# Patient Record
Sex: MALE | Race: BLACK OR AFRICAN AMERICAN | NOT HISPANIC OR LATINO | Employment: UNEMPLOYED | URBAN - METROPOLITAN AREA
[De-identification: names, ages, dates, MRNs, and addresses within clinical notes are randomized per-mention and may not be internally consistent; named-entity substitution may affect disease eponyms.]

---

## 2017-04-17 ENCOUNTER — OFFICE VISIT (OUTPATIENT)
Dept: PHYSICAL MEDICINE AND REHAB | Facility: CLINIC | Age: 57
End: 2017-04-17
Payer: COMMERCIAL

## 2017-04-17 ENCOUNTER — HOSPITAL ENCOUNTER (OUTPATIENT)
Dept: RADIOLOGY | Facility: HOSPITAL | Age: 57
Discharge: HOME | End: 2017-04-17
Attending: PHYSICAL MEDICINE & REHABILITATION
Payer: COMMERCIAL

## 2017-04-17 VITALS
DIASTOLIC BLOOD PRESSURE: 99 MMHG | HEART RATE: 73 BPM | WEIGHT: 165 LBS | SYSTOLIC BLOOD PRESSURE: 153 MMHG | HEIGHT: 71 IN | BODY MASS INDEX: 23.1 KG/M2

## 2017-04-17 DIAGNOSIS — M41.9 SCOLIOSIS OF LUMBAR SPINE, UNSPECIFIED SCOLIOSIS TYPE: ICD-10-CM

## 2017-04-17 DIAGNOSIS — M25.552 LEFT HIP PAIN: Primary | ICD-10-CM

## 2017-04-17 DIAGNOSIS — M25.552 BILATERAL HIP PAIN: ICD-10-CM

## 2017-04-17 DIAGNOSIS — M54.50 CHRONIC LOW BACK PAIN WITHOUT SCIATICA, UNSPECIFIED BACK PAIN LATERALITY: ICD-10-CM

## 2017-04-17 DIAGNOSIS — M25.551 BILATERAL HIP PAIN: ICD-10-CM

## 2017-04-17 DIAGNOSIS — M79.18 MYOFASCIAL PAIN ON RIGHT SIDE: ICD-10-CM

## 2017-04-17 DIAGNOSIS — M25.552 LEFT HIP PAIN: ICD-10-CM

## 2017-04-17 DIAGNOSIS — M51.36 DDD (DEGENERATIVE DISC DISEASE), LUMBAR: ICD-10-CM

## 2017-04-17 DIAGNOSIS — G89.29 CHRONIC LOW BACK PAIN WITHOUT SCIATICA, UNSPECIFIED BACK PAIN LATERALITY: ICD-10-CM

## 2017-04-17 PROCEDURE — 73521 X-RAY EXAM HIPS BI 2 VIEWS: CPT | Mod: 26,,, | Performed by: RADIOLOGY

## 2017-04-17 PROCEDURE — 1160F RVW MEDS BY RX/DR IN RCRD: CPT | Mod: S$GLB,,, | Performed by: PHYSICAL MEDICINE & REHABILITATION

## 2017-04-17 PROCEDURE — 73521 X-RAY EXAM HIPS BI 2 VIEWS: CPT | Mod: TC,PN

## 2017-04-17 PROCEDURE — 72110 X-RAY EXAM L-2 SPINE 4/>VWS: CPT | Mod: 26,,, | Performed by: RADIOLOGY

## 2017-04-17 PROCEDURE — 72110 X-RAY EXAM L-2 SPINE 4/>VWS: CPT | Mod: TC,PN

## 2017-04-17 PROCEDURE — 99999 PR PBB SHADOW E&M-NEW PATIENT-LVL III: CPT | Mod: PBBFAC,,, | Performed by: PHYSICAL MEDICINE & REHABILITATION

## 2017-04-17 PROCEDURE — 20552 NJX 1/MLT TRIGGER POINT 1/2: CPT | Mod: S$GLB,,, | Performed by: PHYSICAL MEDICINE & REHABILITATION

## 2017-04-17 PROCEDURE — 99204 OFFICE O/P NEW MOD 45 MIN: CPT | Mod: 25,S$GLB,, | Performed by: PHYSICAL MEDICINE & REHABILITATION

## 2017-04-17 PROCEDURE — 76942 ECHO GUIDE FOR BIOPSY: CPT | Mod: S$GLB,,, | Performed by: PHYSICAL MEDICINE & REHABILITATION

## 2017-04-17 RX ORDER — BETAMETHASONE SODIUM PHOSPHATE AND BETAMETHASONE ACETATE 3; 3 MG/ML; MG/ML
6 INJECTION, SUSPENSION INTRA-ARTICULAR; INTRALESIONAL; INTRAMUSCULAR; SOFT TISSUE
Status: COMPLETED | OUTPATIENT
Start: 2017-04-17 | End: 2017-04-17

## 2017-04-17 RX ADMIN — BETAMETHASONE SODIUM PHOSPHATE AND BETAMETHASONE ACETATE 6 MG: 3; 3 INJECTION, SUSPENSION INTRA-ARTICULAR; INTRALESIONAL; INTRAMUSCULAR; SOFT TISSUE at 03:04

## 2017-04-18 NOTE — PROGRESS NOTES
[facility]    CHIEF COMPLAINT:   Chief Complaint   Patient presents with    Hip Pain     right hip pain     HISTORY OF PRESENT ILLNESS: Devin Lopez is a 57 y.o. male who presents to me for the first time for evaluation and treatment of right hip pain.  The pain is been present for the last several months.  There was no specific trauma or injury event.  The pain is been increasing over recent weeks.  He rates the pain as a 7 on a scale of 1-10.  He locates the pain most prominently over the right posterior iliac crest.  The pain is increased when he tries to lift anything.  He notes that he sits for prolonged periods for his job and has increased pain when doing so.  He notes some numbness and tingling in the bilateral legs, however he reports this is been present since a cervical spine surgery 25 years ago.  The pain is reduced with rest.  The pain is achy and sharp in nature.  He is not currently taking any medicine for this pain.  He's had no prior treatment of this issue.  He does report that he frequently wakes up from sleep with the pain.    Review of Systems   Constitutional: Negative for fever.   HENT: Negative for drooling.    Eyes: Negative for discharge.   Respiratory: Negative for choking.    Cardiovascular: Negative for chest pain.   Genitourinary: Negative for flank pain.  No bowel or bladder incontinence.  Skin: Negative for wound.   Allergic/Immunologic: Negative for immunocompromised state.   Neurological: Negative for tremors and syncope.   Psychiatric/Behavioral: Negative for behavioral problems.     Past Medical History: History reviewed. No pertinent past medical history.    Past Surgical History:   Past Surgical History:   Procedure Laterality Date    SPINE SURGERY         Family History:   Family History   Problem Relation Age of Onset    Heart disease Mother     Cancer Father        Medications:   No current outpatient prescriptions on file prior to visit.     No  "current facility-administered medications on file prior to visit.      Allergies: Review of patient's allergies indicates:  No Known Allergies    Social History:   Social History     Social History    Marital status:      Spouse name: N/A    Number of children: N/A    Years of education: N/A     Social History Main Topics    Smoking status: Current Every Day Smoker     Packs/day: 1.00     Types: Cigarettes    Smokeless tobacco: None    Alcohol use 0.0 oz/week    Drug use: No    Sexual activity: Yes     Partners: Female      Comment: wife     Other Topics Concern    None     Social History Narrative     Devin works as a car .    PHYSICAL EXAMINATION:   General    Vitals:    04/17/17 1403   BP: (!) 153/99   Pulse: 73   Weight: 74.8 kg (165 lb)   Height: 5' 11" (1.803 m)     Constitutional: Oriented to person, place, and time. No apparent distress. Appears well-developed and well-nourished. Pleasant.  HENT:   Head: Normocephalic and atraumatic.   Eyes: Right eye exhibits no discharge. Left eye exhibits no discharge. No scleral icterus.   Pulmonary/Chest: Effort normal. No respiratory distress.   Abdominal: There is no guarding.   Neurological: Alert and oriented to person, place, and time.   Psychiatric: Behavior is normal.   Back Exam     Tenderness   Back tenderness location: There is some mild tenderness over the right lumbar facets.    Range of Motion   Extension: 50   Flexion: 80   Lateral Bend Right: 60   Lateral Bend Left: 50   Rotation Right: 60   Rotation Left: 50     Muscle Strength   Right Quadriceps:  5/5   Left Quadriceps:  5/5   Right Hamstrings:  5/5   Left Hamstrings:  5/5     Tests   Straight leg raise right: negative  Straight leg raise left: negative    Reflexes   Patellar: 2/4  Achilles: 2/4    Other   Back sensation: He reports subjective reduced sensation to light touch over the lateral right thigh and calf in comparison to the left.  Gait: normal   Erythema: no " back redness  Scars: absent        INSPECTION: There is no swelling, ecchymoses, erythema about the lumbar spine or hip.  There is a right-sided lumbar hump with lumbar flexion.  Palpation: There is no tenderness to palpation about the lumbar spine the midline. There is tenderness to palpation about the right lumbar paraspinal musculature. No tenderness over the bilateral SI joints, or GTB.  There is significant tender to palpation about the right gluteal muscles just inferior to the posterior iliac crest, and palpation of this area seems to reproduce his pain.  Range of motion: + facet loading on the right. Negative SLR bilaterally. Negative MARILEE bilaterally.  Neurologic: Manual muscle testing reveals 5 out of 5 strength throughout bilateral lower extremities. Tone is normal about the bilateral lower extremities. Reflexes are 2+ and symmetric throughout bilateral lower extremities.    Imaging  X-ray of the lumbar spine from 4/17/2017: There is moderate lumbar dextroscoliosis centered at L2.  Moderate loss of disc height is present at L2-3 and L3-4 with associated vacuum phenomenon within the disc space at L2-3.  Moderate multilevel marginal vertebral spurring is present.  No pars interarticularis defect is identified.  Minimal retrolisthesis of L2 on L3 is noted.  There is heavy calcification of the aorta    X-ray of the right hip from 4/17/2017:  No fracture or dislocation.  Hip joint spaces are well-maintained bilaterally.  Mild degenerative change of the pubic symphysis is noted.    Data Reviewed: X-ray    Supportive Actions: Independent visualization of images or test specimens    ASSESSMENT:   1. Left hip pain    2. Scoliosis of lumbar spine, unspecified scoliosis type    3. Myofascial pain on right side    4. DDD (degenerative disc disease), lumbar      PLAN:     1. Time was spent reviewing the above diagnosis in depth with Devin today, including acute management and rehabilitation.     2.  Mr. Lopez has  "some pain and somewhat atypical location located over the superior gluteal muscles.  Palpation of this area seems to reproduce his pain, which can be consistent with myofascial pain.  I offered simple trigger point injections under ultrasound guidance to avoid surrounding neurovascular structures.  He is in favor of this option, see procedure note below.    3.  A significant right-sided lumbar hump was observed on physical exam today.  Follow-up x-rays do so moderate scoliosis with significant degenerative disc disease.  Suspicion of upper lumbar radiculopathy could be considered if today's trigger point injections did not produce results.     4.  I'll contact him by phone in about 2 weeks to assess his response to today's injections.  If he does not get relief we may consider MRI of the lumbar spine.  If he does get relief we may add physical therapy at that time.    Procedure Note: Trigger point injections  Time: 13:30  Indications: Pain  Description: After 1 maximal tender point was identified in the right gluteal musculature, the overlying skin was cleaned with Betadine swabs.  Using a curvilinear ultrasound probe, the point of maximal tenderness was visualized over the right gluteal musculature. Using an in plane approach, the needle was advanced into the musculature. This point was injected with a mixture of 4 cc of 1% Lidocaine and 1 cc of Celestone using a 22 g 3.5" needle. A stellate pattern was then used to needle the surrounding area. Needle was removed and areas cleaned. Blood loss minimal.  Complications: None  Disposition: Pt left in good condition with no complaints.    The above note was completed, in part, with the aid of Dragon dictation software/hardware. Translation errors may be present.    "

## 2017-04-20 ENCOUNTER — TELEPHONE (OUTPATIENT)
Dept: PHYSICAL MEDICINE AND REHAB | Facility: CLINIC | Age: 57
End: 2017-04-20

## 2017-04-20 NOTE — TELEPHONE ENCOUNTER
----- Message from Stacey M Lefort sent at 4/20/2017 10:57 AM CDT -----  Contact: Patient [phone]  Patient needs a callback - he said he is unable to sleep at night due to the pain. Please call him at [phone]. Thank you.

## 2017-04-21 NOTE — TELEPHONE ENCOUNTER
Left message on machine that he should give hip more time and if not improved by end of next week we will get MRI in the meantime use lidocaine over the counter patch

## 2017-04-24 ENCOUNTER — TELEPHONE (OUTPATIENT)
Dept: PHYSICAL MEDICINE AND REHAB | Facility: CLINIC | Age: 57
End: 2017-04-24

## 2017-04-24 DIAGNOSIS — M54.16 LUMBAR RADICULOPATHY: Primary | ICD-10-CM

## 2017-04-24 NOTE — TELEPHONE ENCOUNTER
Patient is still having pain in left side advised that Dr Montes ordered an MRI if the pain persists. He said he will have to get with work and see when he can do the MRI and call us back at the clinic. Patient was insistent that he was told by phone staff that the dr would give him a steroid injection. Much time was spent explaining to patient that Dr Montes decides best course of treatment once he evaluates you. He is very upset that he did not get an injection in his back. I explained to him that no one gives back injections in the clinic and in fact if it is decided that the best course of treatment for him would be a back injection that would be done in the surgery center under anesthesia. He said he came here trying to avoid surgery because he had back surgery some 25 years ago. I explained to him that the injection will not be like an open back surgery. But we will need to obtain an MRI first to determine if that is the direction that he will need to go. Patient states he will call us back to schedule. Order for MRI is in computer

## 2017-04-24 NOTE — TELEPHONE ENCOUNTER
----- Message from Stacey M Lefort sent at 4/24/2017 10:22 AM CDT -----  Contact: Patient - [phone]  He needs to talk to you about his pain level. Please call him at [phone]. Thank you.

## 2017-05-01 ENCOUNTER — TELEPHONE (OUTPATIENT)
Dept: PHYSICAL MEDICINE AND REHAB | Facility: CLINIC | Age: 57
End: 2017-05-01

## 2017-05-01 NOTE — TELEPHONE ENCOUNTER
----- Message from Otilio Montes MD sent at 5/1/2017  2:06 PM CDT -----  Do you know if he got scheduled for that l-spine MRI?    ----- Message -----     From: Otilio Montes MD     Sent: 4/17/2017  10:25 PM       To: Otilio Montes MD

## 2017-05-08 ENCOUNTER — TELEPHONE (OUTPATIENT)
Dept: PHYSICAL MEDICINE AND REHAB | Facility: CLINIC | Age: 57
End: 2017-05-08

## 2017-05-08 NOTE — TELEPHONE ENCOUNTER
----- Message from Jana Naik sent at 5/8/2017  9:04 AM CDT -----  Contact: Patient  Patient called advising that he is returning Abi's call.  Patient advised he needs an MRI scheduled.  I am sending the request to radiology scheduling.  Please call patient back at [phone] if you need to further discuss..  Thank you!

## 2018-02-25 ENCOUNTER — HOSPITAL ENCOUNTER (INPATIENT)
Facility: OTHER | Age: 58
LOS: 3 days | Discharge: HOME | DRG: 291 | End: 2018-02-28
Attending: EMERGENCY MEDICINE | Admitting: EMERGENCY MEDICINE
Payer: COMMERCIAL

## 2018-02-25 DIAGNOSIS — R06.02 SOB (SHORTNESS OF BREATH): ICD-10-CM

## 2018-02-25 DIAGNOSIS — I50.41 ACUTE COMBINED SYSTOLIC AND DIASTOLIC CONGESTIVE HEART FAILURE: ICD-10-CM

## 2018-02-25 DIAGNOSIS — Q23.1 BICUSPID AORTIC VALVE: ICD-10-CM

## 2018-02-25 DIAGNOSIS — R06.02 SHORTNESS OF BREATH: ICD-10-CM

## 2018-02-25 DIAGNOSIS — J81.0 ACUTE PULMONARY EDEMA: Primary | ICD-10-CM

## 2018-02-25 DIAGNOSIS — I49.9: ICD-10-CM

## 2018-02-25 DIAGNOSIS — I49.8 VENTRICULAR BIGEMINY: ICD-10-CM

## 2018-02-25 DIAGNOSIS — Z72.0 TOBACCO ABUSE: ICD-10-CM

## 2018-02-25 DIAGNOSIS — I50.9 ACUTE CONGESTIVE HEART FAILURE, UNSPECIFIED CONGESTIVE HEART FAILURE TYPE: ICD-10-CM

## 2018-02-25 DIAGNOSIS — I35.1 NONRHEUMATIC AORTIC VALVE INSUFFICIENCY: ICD-10-CM

## 2018-02-25 DIAGNOSIS — J96.01 ACUTE RESPIRATORY FAILURE WITH HYPOXIA: ICD-10-CM

## 2018-02-25 DIAGNOSIS — I50.9 CHF (CONGESTIVE HEART FAILURE): ICD-10-CM

## 2018-02-25 DIAGNOSIS — D69.6 THROMBOCYTOPENIA: ICD-10-CM

## 2018-02-25 DIAGNOSIS — D49.4 NEOPLASM OF BLADDER: ICD-10-CM

## 2018-02-25 PROBLEM — R06.01 ORTHOPNEA: Status: ACTIVE | Noted: 2018-02-25

## 2018-02-25 PROBLEM — R79.89 TROPONIN LEVEL ELEVATED: Status: ACTIVE | Noted: 2018-02-25

## 2018-02-25 PROBLEM — R17 SERUM TOTAL BILIRUBIN ELEVATED: Status: ACTIVE | Noted: 2018-02-25

## 2018-02-25 PROBLEM — Z78.9 CURRENT DRINKER OF ALCOHOL: Status: ACTIVE | Noted: 2018-02-25

## 2018-02-25 PROBLEM — J90 BILATERAL PLEURAL EFFUSION: Status: ACTIVE | Noted: 2018-02-25

## 2018-02-25 PROBLEM — I16.0 HYPERTENSIVE URGENCY: Status: ACTIVE | Noted: 2018-02-25

## 2018-02-25 LAB
ALBUMIN SERPL BCP-MCNC: 4 G/DL
ALP SERPL-CCNC: 65 U/L
ALT SERPL W/O P-5'-P-CCNC: 27 U/L
AMPHET+METHAMPHET UR QL: NEGATIVE
ANION GAP SERPL CALC-SCNC: 10 MMOL/L
AST SERPL-CCNC: 26 U/L
BARBITURATES UR QL SCN>200 NG/ML: NEGATIVE
BASOPHILS # BLD AUTO: 0 K/UL
BASOPHILS # BLD AUTO: 0.01 K/UL
BASOPHILS NFR BLD: 0 %
BASOPHILS NFR BLD: 0.1 %
BENZODIAZ UR QL SCN>200 NG/ML: NEGATIVE
BILIRUB DIRECT SERPL-MCNC: 0.7 MG/DL
BILIRUB SERPL-MCNC: 1.8 MG/DL
BILIRUB UR QL STRIP: NEGATIVE
BNP SERPL-MCNC: 1166 PG/ML
BUN SERPL-MCNC: 14 MG/DL
BZE UR QL SCN: NEGATIVE
CALCIUM SERPL-MCNC: 9.4 MG/DL
CANNABINOIDS UR QL SCN: NEGATIVE
CHLORIDE SERPL-SCNC: 106 MMOL/L
CK MB SERPL-MCNC: 10.8 NG/ML
CK MB SERPL-RTO: 5 %
CK SERPL-CCNC: 217 U/L
CLARITY UR: CLEAR
CO2 SERPL-SCNC: 24 MMOL/L
COLOR UR: YELLOW
CREAT SERPL-MCNC: 1 MG/DL
CREAT UR-MCNC: 52.3 MG/DL
DIFFERENTIAL METHOD: ABNORMAL
DIFFERENTIAL METHOD: ABNORMAL
EOSINOPHIL # BLD AUTO: 0 K/UL
EOSINOPHIL # BLD AUTO: 0 K/UL
EOSINOPHIL NFR BLD: 0.2 %
EOSINOPHIL NFR BLD: 0.3 %
ERYTHROCYTE [DISTWIDTH] IN BLOOD BY AUTOMATED COUNT: 14.5 %
ERYTHROCYTE [DISTWIDTH] IN BLOOD BY AUTOMATED COUNT: 14.6 %
EST. GFR  (AFRICAN AMERICAN): >60 ML/MIN/1.73 M^2
EST. GFR  (NON AFRICAN AMERICAN): >60 ML/MIN/1.73 M^2
GLUCOSE SERPL-MCNC: 96 MG/DL
GLUCOSE UR QL STRIP: NEGATIVE
HCT VFR BLD AUTO: 41.8 %
HCT VFR BLD AUTO: 43.5 %
HETEROPH AB SERPL QL IA: NEGATIVE
HGB BLD-MCNC: 14.7 G/DL
HGB BLD-MCNC: 15.4 G/DL
HGB UR QL STRIP: ABNORMAL
KETONES UR QL STRIP: NEGATIVE
LEUKOCYTE ESTERASE UR QL STRIP: NEGATIVE
LIPASE SERPL-CCNC: 8 U/L
LYMPHOCYTES # BLD AUTO: 2.3 K/UL
LYMPHOCYTES # BLD AUTO: 2.5 K/UL
LYMPHOCYTES NFR BLD: 37.2 %
LYMPHOCYTES NFR BLD: 43.2 %
MAGNESIUM SERPL-MCNC: 1.8 MG/DL
MCH RBC QN AUTO: 34.3 PG
MCH RBC QN AUTO: 34.9 PG
MCHC RBC AUTO-ENTMCNC: 35.2 G/DL
MCHC RBC AUTO-ENTMCNC: 35.4 G/DL
MCV RBC AUTO: 98 FL
MCV RBC AUTO: 99 FL
METHADONE UR QL SCN>300 NG/ML: NEGATIVE
MONOCYTES # BLD AUTO: 0.3 K/UL
MONOCYTES # BLD AUTO: 0.5 K/UL
MONOCYTES NFR BLD: 4.8 %
MONOCYTES NFR BLD: 7 %
NEUTROPHILS # BLD AUTO: 2.7 K/UL
NEUTROPHILS # BLD AUTO: 3.7 K/UL
NEUTROPHILS NFR BLD: 51.6 %
NEUTROPHILS NFR BLD: 55.3 %
NITRITE UR QL STRIP: NEGATIVE
OPIATES UR QL SCN: NEGATIVE
PATH REV BLD -IMP: NORMAL
PCP UR QL SCN>25 NG/ML: NEGATIVE
PH UR STRIP: 6 [PH] (ref 5–8)
PHOSPHATE SERPL-MCNC: 3.1 MG/DL
PLATELET # BLD AUTO: 84 K/UL
PLATELET # BLD AUTO: 86 K/UL
PMV BLD AUTO: 11.5 FL
PMV BLD AUTO: 11.7 FL
POCT GLUCOSE: 78 MG/DL (ref 70–110)
POTASSIUM SERPL-SCNC: 4.7 MMOL/L
PROT SERPL-MCNC: 7.1 G/DL
PROT UR QL STRIP: NEGATIVE
RBC # BLD AUTO: 4.28 M/UL
RBC # BLD AUTO: 4.41 M/UL
SODIUM SERPL-SCNC: 140 MMOL/L
SP GR UR STRIP: 1.01 (ref 1–1.03)
TOXICOLOGY INFORMATION: NORMAL
TROPONIN I SERPL DL<=0.01 NG/ML-MCNC: 0.04 NG/ML
TSH SERPL DL<=0.005 MIU/L-ACNC: 1.74 UIU/ML
URN SPEC COLLECT METH UR: ABNORMAL
UROBILINOGEN UR STRIP-ACNC: NEGATIVE EU/DL
WBC # BLD AUTO: 5.26 K/UL
WBC # BLD AUTO: 6.7 K/UL

## 2018-02-25 PROCEDURE — 93010 ELECTROCARDIOGRAM REPORT: CPT | Mod: 76,,, | Performed by: INTERNAL MEDICINE

## 2018-02-25 PROCEDURE — 93005 ELECTROCARDIOGRAM TRACING: CPT

## 2018-02-25 PROCEDURE — 83036 HEMOGLOBIN GLYCOSYLATED A1C: CPT

## 2018-02-25 PROCEDURE — 82248 BILIRUBIN DIRECT: CPT

## 2018-02-25 PROCEDURE — 63600175 PHARM REV CODE 636 W HCPCS: Performed by: HOSPITALIST

## 2018-02-25 PROCEDURE — 99223 1ST HOSP IP/OBS HIGH 75: CPT | Mod: ,,, | Performed by: HOSPITALIST

## 2018-02-25 PROCEDURE — 83735 ASSAY OF MAGNESIUM: CPT

## 2018-02-25 PROCEDURE — 96365 THER/PROPH/DIAG IV INF INIT: CPT

## 2018-02-25 PROCEDURE — 99900035 HC TECH TIME PER 15 MIN (STAT)

## 2018-02-25 PROCEDURE — 93010 ELECTROCARDIOGRAM REPORT: CPT | Mod: ,,, | Performed by: INTERNAL MEDICINE

## 2018-02-25 PROCEDURE — 36415 COLL VENOUS BLD VENIPUNCTURE: CPT

## 2018-02-25 PROCEDURE — 96375 TX/PRO/DX INJ NEW DRUG ADDON: CPT

## 2018-02-25 PROCEDURE — 11000001 HC ACUTE MED/SURG PRIVATE ROOM

## 2018-02-25 PROCEDURE — 99284 EMERGENCY DEPT VISIT MOD MDM: CPT | Mod: 25

## 2018-02-25 PROCEDURE — 81003 URINALYSIS AUTO W/O SCOPE: CPT

## 2018-02-25 PROCEDURE — 25000003 PHARM REV CODE 250: Performed by: HOSPITALIST

## 2018-02-25 PROCEDURE — 83690 ASSAY OF LIPASE: CPT

## 2018-02-25 PROCEDURE — 80053 COMPREHEN METABOLIC PANEL: CPT

## 2018-02-25 PROCEDURE — 84484 ASSAY OF TROPONIN QUANT: CPT

## 2018-02-25 PROCEDURE — 84443 ASSAY THYROID STIM HORMONE: CPT

## 2018-02-25 PROCEDURE — 80307 DRUG TEST PRSMV CHEM ANLYZR: CPT

## 2018-02-25 PROCEDURE — 84100 ASSAY OF PHOSPHORUS: CPT

## 2018-02-25 PROCEDURE — 85025 COMPLETE CBC W/AUTO DIFF WBC: CPT

## 2018-02-25 PROCEDURE — 63600175 PHARM REV CODE 636 W HCPCS: Performed by: EMERGENCY MEDICINE

## 2018-02-25 PROCEDURE — 83880 ASSAY OF NATRIURETIC PEPTIDE: CPT

## 2018-02-25 PROCEDURE — 82553 CREATINE MB FRACTION: CPT

## 2018-02-25 PROCEDURE — 86308 HETEROPHILE ANTIBODY SCREEN: CPT

## 2018-02-25 RX ORDER — CARVEDILOL 6.25 MG/1
6.25 TABLET ORAL 2 TIMES DAILY
Status: DISCONTINUED | OUTPATIENT
Start: 2018-02-25 | End: 2018-02-28 | Stop reason: HOSPADM

## 2018-02-25 RX ORDER — FUROSEMIDE 10 MG/ML
20 INJECTION INTRAMUSCULAR; INTRAVENOUS ONCE
Status: COMPLETED | OUTPATIENT
Start: 2018-02-25 | End: 2018-02-25

## 2018-02-25 RX ORDER — FUROSEMIDE 10 MG/ML
40 INJECTION INTRAMUSCULAR; INTRAVENOUS
Status: COMPLETED | OUTPATIENT
Start: 2018-02-25 | End: 2018-02-25

## 2018-02-25 RX ORDER — HYDRALAZINE HYDROCHLORIDE 20 MG/ML
10 INJECTION INTRAMUSCULAR; INTRAVENOUS EVERY 6 HOURS PRN
Status: DISCONTINUED | OUTPATIENT
Start: 2018-02-25 | End: 2018-02-28 | Stop reason: HOSPADM

## 2018-02-25 RX ORDER — LOSARTAN POTASSIUM 50 MG/1
50 TABLET ORAL DAILY
Status: DISCONTINUED | OUTPATIENT
Start: 2018-02-25 | End: 2018-02-28 | Stop reason: HOSPADM

## 2018-02-25 RX ORDER — ASPIRIN 325 MG
325 TABLET ORAL ONCE
Status: COMPLETED | OUTPATIENT
Start: 2018-02-25 | End: 2018-02-25

## 2018-02-25 RX ORDER — ENOXAPARIN SODIUM 100 MG/ML
70 INJECTION SUBCUTANEOUS ONCE
Status: COMPLETED | OUTPATIENT
Start: 2018-02-25 | End: 2018-02-25

## 2018-02-25 RX ORDER — RAMELTEON 8 MG/1
8 TABLET ORAL NIGHTLY PRN
Status: DISCONTINUED | OUTPATIENT
Start: 2018-02-25 | End: 2018-02-28 | Stop reason: HOSPADM

## 2018-02-25 RX ADMIN — CARVEDILOL 6.25 MG: 6.25 TABLET, FILM COATED ORAL at 05:02

## 2018-02-25 RX ADMIN — THIAMINE HYDROCHLORIDE 100 MG: 100 INJECTION, SOLUTION INTRAMUSCULAR; INTRAVENOUS at 04:02

## 2018-02-25 RX ADMIN — FUROSEMIDE 20 MG: 10 INJECTION, SOLUTION INTRAVENOUS at 09:02

## 2018-02-25 RX ADMIN — ENOXAPARIN SODIUM 70 MG: 100 INJECTION SUBCUTANEOUS at 05:02

## 2018-02-25 RX ADMIN — LOSARTAN POTASSIUM 50 MG: 50 TABLET, FILM COATED ORAL at 05:02

## 2018-02-25 RX ADMIN — ASPIRIN 325 MG ORAL TABLET 325 MG: 325 PILL ORAL at 05:02

## 2018-02-25 RX ADMIN — FUROSEMIDE 40 MG: 10 INJECTION, SOLUTION INTRAVENOUS at 02:02

## 2018-02-25 NOTE — HPI
Mr. Lopez is a 57 year old man who presented with 1.5 weeks of worsening CERVANTES and orthopnea without associated chest pain.  He reported that he has developed a choking sensation when he lies down and he has to sleep on 2 pillows.  He has associated BLE edema.  CXR had findings consisted with pulmonary edema and BNP was >1000 without a previous baseline available.  He received 40 mg Lasix IV and was admitted for new onset heart failure.    He has no significant PMH but is a smoker and has a couple shots of liquor/day.  He is usually very active.

## 2018-02-25 NOTE — ED NOTES
Dr. Davey at bedside to explain test results and plan of care. The patient voided 400ml in urinal. He denies pain, no acute distress, will continue to monitor.

## 2018-02-25 NOTE — H&P
"[facility]  History & Physical    Patient Name: Devin Lopez  MRN: [id number]  Admission Date: 2/25/2018  Attending Physician: Gagan Abreu MD   Primary Care Provider: Gage Jaimes Jr, MD         Patient information was obtained from patient, spouse/SO and ER records.     Subjective:     Principal Problem:Acute pulmonary edema    Chief Complaint:   Chief Complaint   Patient presents with    Shortness of Breath     Increasing x 3 weeks/ " I notice when I walk the dog I ahve to stop and sit down becuase I get so tired". + generalzied fatigue, denies chest pains or resp distress        HPI: 57 year old man with no significant PMH, but is EtOH and tobacco user, presents with 1.5 week history of worsening CERVANTES and orthopnea.  He denies any associated chest pain.  He notes that when he lies down he has developed a choking sensation.  He has had to sleep on two pillow recently.  He also note BLE edema.  He denies any history of DTs.  No fever or recent weight loss.  He is usually active.      CXR had findings consisted with pulmonary edema.  EKG shows SR with PVCs.  He received 40 mg Lasix IV and has been urinating and feels much better currently.        History reviewed. No pertinent past medical history.    Past Surgical History:   Procedure Laterality Date    SPINE SURGERY         Review of patient's allergies indicates:  No Known Allergies    No current facility-administered medications on file prior to encounter.      No current outpatient prescriptions on file prior to encounter.     Family History     Problem Relation (Age of Onset)    Cancer Father    Heart disease Mother        Social History Main Topics    Smoking status: Current Every Day Smoker     Packs/day: 0.50     Types: Cigarettes    Smokeless tobacco: Never Used    Alcohol use 8.4 oz/week     14 Shots of liquor per week    Drug use: No    Sexual activity: Yes     Partners: Female      Comment: wife "     Review of Systems   Constitutional: Positive for fatigue. Negative for fever and unexpected weight change.   HENT: Negative for congestion.    Eyes: Negative for visual disturbance.   Respiratory: Positive for choking and shortness of breath.    Cardiovascular: Positive for leg swelling. Negative for chest pain.        2 pillow orthopnea.  CERVANTES.      Gastrointestinal: Negative for abdominal pain, constipation, diarrhea, nausea and vomiting.   Endocrine: Negative for cold intolerance and heat intolerance.   Genitourinary: Negative for difficulty urinating and dysuria.   Musculoskeletal: Negative for arthralgias and gait problem.   Skin: Negative.    Neurological: Negative for dizziness and headaches.   Psychiatric/Behavioral: Negative for agitation and behavioral problems.     Objective:     Vital Signs (Most Recent):  Temp: 98 °F (36.7 °C) (02/25/18 1320)  Pulse: 96 (02/25/18 1614)  Resp: 16 (02/25/18 1614)  BP: (!) 169/87 (02/25/18 1614)  SpO2: (!) 94 % (02/25/18 1614) Vital Signs (24h Range):  Temp:  [98 °F (36.7 °C)] 98 °F (36.7 °C)  Pulse:  [] 96  Resp:  [15-22] 16  SpO2:  [93 %-98 %] 94 %  BP: (144-185)/(74-96) 169/87     Weight: 70.3 kg (155 lb)  Body mass index is 21.62 kg/m².    Physical Exam   Constitutional: He is oriented to person, place, and time. He appears well-developed and well-nourished. No distress.   HENT:   Head: Normocephalic and atraumatic.   Eyes: Conjunctivae are normal. Pupils are equal, round, and reactive to light.   Brown   Neck: Normal range of motion. Neck supple. No JVD present.   Cardiovascular: Normal rate and normal heart sounds.    Regular with irregular beat periodic   Pulmonary/Chest: Effort normal. He has rales.   Bilateral mid to lower lung fields    Abdominal: Soft. Bowel sounds are normal. There is no tenderness.   Musculoskeletal: He exhibits edema.   2+ pitting BLE   Neurological: He is alert and oriented to person, place, and time.   Skin: Skin is warm and dry.    Psychiatric: He has a normal mood and affect. His behavior is normal.         CRANIAL NERVES     CN III, IV, VI   Pupils are equal, round, and reactive to light.       Significant Labs:   CBC:   Recent Labs  Lab 02/25/18  1406   WBC 5.26   HGB 15.4   HCT 43.5   PLT 84*     CMP:   Recent Labs  Lab 02/25/18  1406      K 4.7      CO2 24   GLU 96   BUN 14   CREATININE 1.0   CALCIUM 9.4   PROT 7.1   ALBUMIN 4.0   BILITOT 1.8*   ALKPHOS 65   AST 26   ALT 27   ANIONGAP 10   EGFRNONAA >60     Cardiac Markers:   Recent Labs  Lab 02/25/18  1406   BNP 1,166*     Magnesium:   Recent Labs  Lab 02/25/18  1406   MG 1.8     TSH: No results for input(s): TSH in the last 4320 hours.  Urine Studies:   Recent Labs  Lab 02/25/18  1523   COLORU Yellow   APPEARANCEUA Clear   PHUR 6.0   SPECGRAV 1.015   PROTEINUA Negative   GLUCUA Negative   KETONESU Negative   BILIRUBINUA Negative   OCCULTUA Trace*   NITRITE Negative   UROBILINOGEN Negative   LEUKOCYTESUR Negative       Significant Imaging: I have reviewed all pertinent imaging results/findings within the past 24 hours.   Imaging Results          X-Ray Chest PA And Lateral (Final result)  Result time 02/25/18 14:26:16    Final result by Nyasia Hayden MD (02/25/18 14:26:16)                 Impression:     Abnormal exam, findings may relate to edema or infection.  Correlate with additional clinical findings.      Electronically signed by: Nyasia Hayden MD  Date:     02/25/18  Time:    14:26              Narrative:    2 views obtained.  No comparison study.    The cardiac size is not enlarged.  There is no increase in pulmonary vascularity.  Surgical changes are incompletely visualized in the lower cervical spine.  Osseous structures demonstrate mild spinal curvature.  There are small bilateral pleural effusions.  There are increased interstitial markings with some bibasilar patchy parenchymal opacities, right greater than left.                            Assessment/Plan:      * Acute pulmonary edema    - Suspect secondary to acute CHF.             Bilateral pleural effusion    - Monitor for improvement             Hypertensive urgency    - Treat CHF  - Start BP meds.  - See above.             Serum total bilirubin elevated    - Noted on past lab.    - Liver US in AM.             Current drinker of alcohol    - Counseled to avoid excess.   - Denies any history of DTs.   - Give thiamine 100 mg IV.             Troponin level elevated    - Mild elevation CK, and CK-MB elevated.  - Give  and Lovenox 70 mg once.   - No chest pain.   - Risk factors include tobacco, and possibly HTN.   - A1c, FLP.   - Trend enzymes.            Orthopnea    - Clinically has CHF.             Tobacco abuse    - 1/2 ppd for 40 years.  - Counseled to cease.             Thrombocytopenia    - Platelets were 133 in 2012.  Currently 84.   - Etiology unclear.   - Check COAGs.   - Pathologist interpretation peripheral smear.  - Monitor.            Acute respiratory failure with hypoxia    - Secondary to pulmonary edema secondary to CHF.   - Improving with diuresis.            Acute CHF    - Check cardiac enzymes, TSH.   - Etiology unclear.  Drinks EtOH, and has some CAD risk factors.   - 2-D ECHO  - Cardiology consult.   - Symptoms improving with diuresis.  He is urinating so far close to 2 liters dilute urine after Lasix 40.   - Repeat Lasix 20 at 2000 tonight.   - Start Coreg 6.25 and losartan 50.   - Monitor electrolytes.             VTE Risk Mitigation         Ordered     enoxaparin injection 70 mg  Once     Route:  Subcutaneous        02/25/18 9138             Gagan Abreu MD  Department of Hospital Medicine   [facility]

## 2018-02-25 NOTE — ASSESSMENT & PLAN NOTE
- Check cardiac enzymes, TSH.   - Etiology unclear.  Drinks EtOH, and has some CAD risk factors.   - 2-D ECHO  - Cardiology consult.   - Symptoms improving with diuresis.  He is urinating so far close to 2 liters dilute urine after Lasix 40.   - Repeat Lasix 20 at 2000 tonight.   - Start Coreg 6.25 and losartan 50.   - Monitor electrolytes.

## 2018-02-25 NOTE — ASSESSMENT & PLAN NOTE
- Mild elevation CK, and CK-MB elevated.  - Give  and Lovenox 70 mg once.   - No chest pain.   - Risk factors include tobacco, and possibly HTN.   - A1c, FLP.   - Trend enzymes.

## 2018-02-25 NOTE — SUBJECTIVE & OBJECTIVE
History reviewed. No pertinent past medical history.    Past Surgical History:   Procedure Laterality Date    SPINE SURGERY         Review of patient's allergies indicates:  No Known Allergies    No current facility-administered medications on file prior to encounter.      No current outpatient prescriptions on file prior to encounter.     Family History     Problem Relation (Age of Onset)    Cancer Father    Heart disease Mother        Social History Main Topics    Smoking status: Current Every Day Smoker     Packs/day: 0.50     Types: Cigarettes    Smokeless tobacco: Never Used    Alcohol use 8.4 oz/week     14 Shots of liquor per week    Drug use: No    Sexual activity: Yes     Partners: Female      Comment: wife     Review of Systems   Constitutional: Positive for fatigue. Negative for fever and unexpected weight change.   HENT: Negative for congestion.    Eyes: Negative for visual disturbance.   Respiratory: Positive for choking and shortness of breath.    Cardiovascular: Positive for leg swelling. Negative for chest pain.        2 pillow orthopnea.  CERVANTES.      Gastrointestinal: Negative for abdominal pain, constipation, diarrhea, nausea and vomiting.   Endocrine: Negative for cold intolerance and heat intolerance.   Genitourinary: Negative for difficulty urinating and dysuria.   Musculoskeletal: Negative for arthralgias and gait problem.   Skin: Negative.    Neurological: Negative for dizziness and headaches.   Psychiatric/Behavioral: Negative for agitation and behavioral problems.     Objective:     Vital Signs (Most Recent):  Temp: 98 °F (36.7 °C) (02/25/18 1320)  Pulse: 96 (02/25/18 1614)  Resp: 16 (02/25/18 1614)  BP: (!) 169/87 (02/25/18 1614)  SpO2: (!) 94 % (02/25/18 1614) Vital Signs (24h Range):  Temp:  [98 °F (36.7 °C)] 98 °F (36.7 °C)  Pulse:  [] 96  Resp:  [15-22] 16  SpO2:  [93 %-98 %] 94 %  BP: (144-185)/(74-96) 169/87     Weight: 70.3 kg (155 lb)  Body mass index is 21.62  kg/m².    Physical Exam   Constitutional: He is oriented to person, place, and time. He appears well-developed and well-nourished. No distress.   HENT:   Head: Normocephalic and atraumatic.   Eyes: Conjunctivae are normal. Pupils are equal, round, and reactive to light.   Brown   Neck: Normal range of motion. Neck supple. No JVD present.   Cardiovascular: Normal rate and normal heart sounds.    Regular with irregular beat periodic   Pulmonary/Chest: Effort normal. He has rales.   Bilateral mid to lower lung fields    Abdominal: Soft. Bowel sounds are normal. There is no tenderness.   Musculoskeletal: He exhibits edema.   2+ pitting BLE   Neurological: He is alert and oriented to person, place, and time.   Skin: Skin is warm and dry.   Psychiatric: He has a normal mood and affect. His behavior is normal.         CRANIAL NERVES     CN III, IV, VI   Pupils are equal, round, and reactive to light.       Significant Labs:   CBC:   Recent Labs  Lab 02/25/18  1406   WBC 5.26   HGB 15.4   HCT 43.5   PLT 84*     CMP:   Recent Labs  Lab 02/25/18  1406      K 4.7      CO2 24   GLU 96   BUN 14   CREATININE 1.0   CALCIUM 9.4   PROT 7.1   ALBUMIN 4.0   BILITOT 1.8*   ALKPHOS 65   AST 26   ALT 27   ANIONGAP 10   EGFRNONAA >60     Cardiac Markers:   Recent Labs  Lab 02/25/18  1406   BNP 1,166*     Magnesium:   Recent Labs  Lab 02/25/18  1406   MG 1.8     TSH: No results for input(s): TSH in the last 4320 hours.  Urine Studies:   Recent Labs  Lab 02/25/18  1523   COLORU Yellow   APPEARANCEUA Clear   PHUR 6.0   SPECGRAV 1.015   PROTEINUA Negative   GLUCUA Negative   KETONESU Negative   BILIRUBINUA Negative   OCCULTUA Trace*   NITRITE Negative   UROBILINOGEN Negative   LEUKOCYTESUR Negative       Significant Imaging: I have reviewed all pertinent imaging results/findings within the past 24 hours.   Imaging Results          X-Ray Chest PA And Lateral (Final result)  Result time 02/25/18 14:26:16    Final result by Nyasia THOMAS  MD Jackelyn (02/25/18 14:26:16)                 Impression:     Abnormal exam, findings may relate to edema or infection.  Correlate with additional clinical findings.      Electronically signed by: Nyasia Hayden MD  Date:     02/25/18  Time:    14:26              Narrative:    2 views obtained.  No comparison study.    The cardiac size is not enlarged.  There is no increase in pulmonary vascularity.  Surgical changes are incompletely visualized in the lower cervical spine.  Osseous structures demonstrate mild spinal curvature.  There are small bilateral pleural effusions.  There are increased interstitial markings with some bibasilar patchy parenchymal opacities, right greater than left.

## 2018-02-25 NOTE — ED PROVIDER NOTES
"Encounter Date: 2/25/2018    SCRIBE #1 NOTE: I, Elke Oliva, am scribing for, and in the presence of, Dr. Davey.       History     Chief Complaint   Patient presents with    Shortness of Breath     Increasing x 3 weeks/ " I notice when I walk the dog I ahve to stop and sit down becuase I get so tired". + generalzied fatigue, denies chest pains or resp distress     Time seen by provider: 1:42 PM    This is a 57 y.o. male who presents with complaint of fatigue for the last 1.5 weeks. Symptoms began soon after [place]. Patient notes difficulty breathing when trying to walk his dog, which is surprising since he is generally active. He also endorses a feeling dyspnea worsened when lying down ("like I'm choking") and alleviated when sitting up. He has associated leg swelling and a mild URI with persistent cough since last month. He denies fever, night sweats, weight loss, chest pain, chest tightness, or palpitations. He also abdominal or dark stools. He has taken Robitussin and Aleve with no relief. He has no additional complaints.     Patient reports daily use of alcohol until Lent. He wife says he has been more jittery since that time. Additional past medical, surgical, and social history as outlined in the nursing assessment was reviewed by me.        The history is provided by the patient.     Review of patient's allergies indicates:  No Known Allergies  History reviewed. No pertinent past medical history.  Past Surgical History:   Procedure Laterality Date    SPINE SURGERY       Family History   Problem Relation Age of Onset    Heart disease Mother     Cancer Father      Social History   Substance Use Topics    Smoking status: Current Every Day Smoker     Packs/day: 0.50     Types: Cigarettes    Smokeless tobacco: Never Used    Alcohol use 8.4 oz/week     14 Shots of liquor per week     Review of Systems   Constitutional: Positive for fatigue. Negative for appetite change, chills, diaphoresis, fever and " unexpected weight change.   HENT: Positive for congestion. Negative for rhinorrhea and sore throat.    Respiratory: Positive for cough and shortness of breath.    Cardiovascular: Positive for leg swelling. Negative for chest pain and palpitations.   Gastrointestinal: Positive for diarrhea and nausea. Negative for abdominal pain, blood in stool, constipation and vomiting.   Endocrine: Positive for heat intolerance. Negative for polyuria.   Genitourinary: Negative for decreased urine volume, difficulty urinating, dysuria, enuresis, frequency, hematuria and urgency.   Musculoskeletal: Negative for back pain.   Skin: Negative for rash.   Allergic/Immunologic: Negative for immunocompromised state.   Neurological: Positive for headaches. Negative for dizziness, weakness and numbness.   Hematological: Does not bruise/bleed easily.   Psychiatric/Behavioral: Negative for confusion.       Physical Exam     Initial Vitals [02/25/18 1320]   BP Pulse Resp Temp SpO2   (!) 182/88 100 18 98 °F (36.7 °C) 98 %      MAP       119.33         Physical Exam    Nursing note and vitals reviewed.  Constitutional: He appears well-developed and well-nourished. He is not diaphoretic. No distress.   HENT:   Head: Normocephalic and atraumatic.   Right Ear: External ear normal.   Left Ear: External ear normal.   Eyes: Conjunctivae and EOM are normal. Right eye exhibits no discharge. Left eye exhibits no discharge.   Neck: Normal range of motion. Neck supple. No tracheal deviation present.   Cardiovascular: Normal rate, regular rhythm and intact distal pulses. Exam reveals no gallop and no friction rub.    Murmur heard.   Diastolic murmur is present   Pulmonary/Chest: Breath sounds normal. No stridor. No respiratory distress. He has no wheezes. He has no rhonchi. He has no rales.   Abdominal: Soft. Bowel sounds are normal. He exhibits no distension. There is no tenderness. There is no rebound and no guarding.   Musculoskeletal: Normal range of  motion. He exhibits no edema or tenderness.   Extremities: 2+ pitting edema on the left and 1+ pitting edema on the right.   Neurological: He is alert and oriented to person, place, and time. He has normal strength. No cranial nerve deficit.   Skin: Skin is warm and dry. Capillary refill takes less than 2 seconds. No erythema. No pallor.   Hyperpigmented patches that are baseline, as per patient.   Psychiatric: He has a normal mood and affect. Thought content normal.         ED Course   Procedures  Labs Reviewed   TROPONIN I - Abnormal; Notable for the following:        Result Value    Troponin I 0.041 (*)     All other components within normal limits   B-TYPE NATRIURETIC PEPTIDE - Abnormal; Notable for the following:     BNP 1,166 (*)     All other components within normal limits   CBC W/ AUTO DIFFERENTIAL - Abnormal; Notable for the following:     RBC 4.41 (*)     MCV 99 (*)     MCH 34.9 (*)     RDW 14.6 (*)     Platelets 84 (*)     All other components within normal limits   COMPREHENSIVE METABOLIC PANEL - Abnormal; Notable for the following:     Total Bilirubin 1.8 (*)     All other components within normal limits   URINALYSIS - Abnormal; Notable for the following:     Occult Blood UA Trace (*)     All other components within normal limits   HETEROPHILE AB SCREEN   MAGNESIUM   PHOSPHORUS   LIPASE   MAGNESIUM   PHOSPHORUS   CK   CK-MB   TSH   HEMOGLOBIN A1C      Imaging Results          X-Ray Chest PA And Lateral (Final result)  Result time 02/25/18 14:26:16    Final result by Nyasia Hayden MD (02/25/18 14:26:16)                 Impression:     Abnormal exam, findings may relate to edema or infection.  Correlate with additional clinical findings.      Electronically signed by: Nyasia Hayden MD  Date:     02/25/18  Time:    14:26              Narrative:    2 views obtained.  No comparison study.    The cardiac size is not enlarged.  There is no increase in pulmonary vascularity.  Surgical changes are  incompletely visualized in the lower cervical spine.  Osseous structures demonstrate mild spinal curvature.  There are small bilateral pleural effusions.  There are increased interstitial markings with some bibasilar patchy parenchymal opacities, right greater than left.                            EKG Readings: (Independently Interpreted)   Initial Reading: No STEMI.   Normal sinus rhythm at a rate of 98 bpm with multiple PVCs.       X-Rays:   Independently Interpreted Readings:   Chest X-Ray: Peribronchial thickening. Prominent interstitial markings.     Medical Decision Making:   Initial Assessment:   Patient presents with fatigue. He significant ectopy on the cardiac monitor. He also has a murmur on exam and signs of fluid overload. I will obtain a BNP and check a CXR to look for pulmonary edema. I will also check his electrolytes including magnesium. I believe he will need admission for further evaluation and management.   Clinical Tests:   Lab Tests: Ordered and Reviewed  Radiological Study: Ordered and Reviewed  Medical Tests: Ordered and Reviewed  ED Management:  3:44 PM - Patient remains comfortable. Diagnostic workup is c/w CHF. He has begun voiding since receiving Lasix. He has no respiratory distress. He has had multiple bouts of bigeminy throughout his ED course but continues to have no palpitations of chest pain. I will admit for further management and evaluation by Cardiology. Case discussed with Dr. Abreu of the hospitalist service who has accepted this admission and will assume care of the patient at this time.            Scribe Attestation:   Scribe #1: I performed the above scribed service and the documentation accurately describes the services I performed. I attest to the accuracy of the note.    Attending Attestation:           Physician Attestation for Scribe:  Physician Attestation Statement for Scribe #1: I, Dr. Davey, reviewed documentation, as scribed by Elke Oliva in my presence, and  it is both accurate and complete.                    Clinical Impression:     1. Acute pulmonary edema    2. SOB (shortness of breath)    3. Shortness of breath    4. Dysrhythmias    5. Ventricular bigeminy                               Collette Davey MD  02/25/18 6500       Collette Davey MD  02/25/18 0290

## 2018-02-25 NOTE — ASSESSMENT & PLAN NOTE
- Platelets were 133 in 2012.  Currently 84.   - Etiology unclear.   - Check COAGs.   - Pathologist interpretation peripheral smear.  - Monitor.

## 2018-02-26 PROBLEM — I35.1 NONRHEUMATIC AORTIC VALVE INSUFFICIENCY: Status: ACTIVE | Noted: 2018-02-26

## 2018-02-26 PROBLEM — Q23.1 BICUSPID AORTIC VALVE: Status: ACTIVE | Noted: 2018-02-26

## 2018-02-26 PROBLEM — Q23.81 BICUSPID AORTIC VALVE: Status: ACTIVE | Noted: 2018-02-26

## 2018-02-26 LAB
ANION GAP SERPL CALC-SCNC: 10 MMOL/L
AORTIC VALVE REGURGITATION: ABNORMAL
BASOPHILS # BLD AUTO: 0.01 K/UL
BASOPHILS NFR BLD: 0.1 %
BUN SERPL-MCNC: 20 MG/DL
CALCIUM SERPL-MCNC: 8.7 MG/DL
CHLORIDE SERPL-SCNC: 107 MMOL/L
CHOLEST SERPL-MCNC: 112 MG/DL
CHOLEST/HDLC SERPL: 2.9 {RATIO}
CK MB SERPL-MCNC: 5.5 NG/ML
CK MB SERPL-MCNC: 7.1 NG/ML
CK MB SERPL-RTO: 3.8 %
CK MB SERPL-RTO: 4.2 %
CK SERPL-CCNC: 146 U/L
CK SERPL-CCNC: 146 U/L
CK SERPL-CCNC: 168 U/L
CK SERPL-CCNC: 168 U/L
CO2 SERPL-SCNC: 23 MMOL/L
CREAT SERPL-MCNC: 1.1 MG/DL
DIASTOLIC DYSFUNCTION: YES
DIFFERENTIAL METHOD: ABNORMAL
EOSINOPHIL # BLD AUTO: 0 K/UL
EOSINOPHIL NFR BLD: 0.6 %
ERYTHROCYTE [DISTWIDTH] IN BLOOD BY AUTOMATED COUNT: 14.6 %
EST. GFR  (AFRICAN AMERICAN): >60 ML/MIN/1.73 M^2
EST. GFR  (NON AFRICAN AMERICAN): >60 ML/MIN/1.73 M^2
ESTIMATED AVG GLUCOSE: 100 MG/DL
ESTIMATED PA SYSTOLIC PRESSURE: 46.51
GLOBAL PERICARDIAL EFFUSION: ABNORMAL
GLUCOSE SERPL-MCNC: 90 MG/DL
HBA1C MFR BLD HPLC: 5.1 %
HCT VFR BLD AUTO: 40 %
HDLC SERPL-MCNC: 39 MG/DL
HDLC SERPL: 34.8 %
HGB BLD-MCNC: 14.2 G/DL
LDLC SERPL CALC-MCNC: 61.8 MG/DL
LYMPHOCYTES # BLD AUTO: 3.2 K/UL
LYMPHOCYTES NFR BLD: 48.6 %
MAGNESIUM SERPL-MCNC: 1.6 MG/DL
MCH RBC QN AUTO: 34.6 PG
MCHC RBC AUTO-ENTMCNC: 35.5 G/DL
MCV RBC AUTO: 98 FL
MITRAL VALVE REGURGITATION: ABNORMAL
MONOCYTES # BLD AUTO: 0.4 K/UL
MONOCYTES NFR BLD: 5.8 %
NEUTROPHILS # BLD AUTO: 3 K/UL
NEUTROPHILS NFR BLD: 44.6 %
NONHDLC SERPL-MCNC: 73 MG/DL
PHOSPHATE SERPL-MCNC: 4.2 MG/DL
PLATELET # BLD AUTO: 82 K/UL
PMV BLD AUTO: 12.2 FL
POTASSIUM SERPL-SCNC: 3.9 MMOL/L
RBC # BLD AUTO: 4.1 M/UL
RETIRED EF AND QEF - SEE NOTES: 38 (ref 55–65)
SODIUM SERPL-SCNC: 140 MMOL/L
TRICUSPID VALVE REGURGITATION: ABNORMAL
TRIGL SERPL-MCNC: 56 MG/DL
TROPONIN I SERPL DL<=0.01 NG/ML-MCNC: 0.05 NG/ML
TROPONIN I SERPL DL<=0.01 NG/ML-MCNC: 0.06 NG/ML
WBC # BLD AUTO: 6.67 K/UL

## 2018-02-26 PROCEDURE — 99232 SBSQ HOSP IP/OBS MODERATE 35: CPT | Mod: ,,, | Performed by: HOSPITALIST

## 2018-02-26 PROCEDURE — 11000001 HC ACUTE MED/SURG PRIVATE ROOM

## 2018-02-26 PROCEDURE — 84100 ASSAY OF PHOSPHORUS: CPT

## 2018-02-26 PROCEDURE — 99900035 HC TECH TIME PER 15 MIN (STAT)

## 2018-02-26 PROCEDURE — 80048 BASIC METABOLIC PNL TOTAL CA: CPT

## 2018-02-26 PROCEDURE — 82550 ASSAY OF CK (CPK): CPT

## 2018-02-26 PROCEDURE — 85025 COMPLETE CBC W/AUTO DIFF WBC: CPT

## 2018-02-26 PROCEDURE — 80061 LIPID PANEL: CPT

## 2018-02-26 PROCEDURE — 93306 TTE W/DOPPLER COMPLETE: CPT

## 2018-02-26 PROCEDURE — 82553 CREATINE MB FRACTION: CPT | Mod: 91

## 2018-02-26 PROCEDURE — 63600175 PHARM REV CODE 636 W HCPCS: Performed by: HOSPITALIST

## 2018-02-26 PROCEDURE — 93005 ELECTROCARDIOGRAM TRACING: CPT

## 2018-02-26 PROCEDURE — 25000003 PHARM REV CODE 250: Performed by: INTERNAL MEDICINE

## 2018-02-26 PROCEDURE — 83735 ASSAY OF MAGNESIUM: CPT

## 2018-02-26 PROCEDURE — 36415 COLL VENOUS BLD VENIPUNCTURE: CPT

## 2018-02-26 PROCEDURE — 82550 ASSAY OF CK (CPK): CPT | Mod: 91

## 2018-02-26 PROCEDURE — 93010 ELECTROCARDIOGRAM REPORT: CPT | Mod: ,,, | Performed by: INTERNAL MEDICINE

## 2018-02-26 PROCEDURE — 84484 ASSAY OF TROPONIN QUANT: CPT

## 2018-02-26 PROCEDURE — 25000003 PHARM REV CODE 250: Performed by: HOSPITALIST

## 2018-02-26 PROCEDURE — 82553 CREATINE MB FRACTION: CPT

## 2018-02-26 PROCEDURE — 84484 ASSAY OF TROPONIN QUANT: CPT | Mod: 91

## 2018-02-26 RX ORDER — FUROSEMIDE 40 MG/1
40 TABLET ORAL DAILY
Status: DISCONTINUED | OUTPATIENT
Start: 2018-02-26 | End: 2018-02-28 | Stop reason: HOSPADM

## 2018-02-26 RX ORDER — ENOXAPARIN SODIUM 100 MG/ML
40 INJECTION SUBCUTANEOUS DAILY
Status: DISCONTINUED | OUTPATIENT
Start: 2018-02-26 | End: 2018-02-28 | Stop reason: HOSPADM

## 2018-02-26 RX ADMIN — MAGNESIUM SULFATE HEPTAHYDRATE 1 G: 500 INJECTION, SOLUTION INTRAMUSCULAR; INTRAVENOUS at 11:02

## 2018-02-26 RX ADMIN — CARVEDILOL 6.25 MG: 6.25 TABLET, FILM COATED ORAL at 09:02

## 2018-02-26 RX ADMIN — FUROSEMIDE 40 MG: 40 TABLET ORAL at 11:02

## 2018-02-26 RX ADMIN — ENOXAPARIN SODIUM 40 MG: 100 INJECTION SUBCUTANEOUS at 06:02

## 2018-02-26 RX ADMIN — LOSARTAN POTASSIUM 50 MG: 50 TABLET, FILM COATED ORAL at 09:02

## 2018-02-26 NOTE — ASSESSMENT & PLAN NOTE
- TSH WNL.   - Etiology unclear.  Drinks EtOH, and has some CAD risk factors.   - 2-D ECHO  - Symptoms improving with diuresis.  He is urinating so far close to 2 liters dilute urine after Lasix 40.   - Got IV Lasix 40 in ED and IV Lasix 20 last night.   - Started Coreg 6.25 and losartan 50.   - Start Lasix 40 mg PO today.  Discussed with Dr. Alarcon. - BLE edema better.   - May have cath in AM.   - Monitor electrolytes.

## 2018-02-26 NOTE — PROGRESS NOTES
[facility] Hospital Medicine  Progress Note    Patient Name: Devin Lopez  MRN: [id number]  Patient Class: IP- Inpatient   Admission Date: 2/25/2018  Length of Stay: 1 days  Attending Physician: Gagan Abreu MD  Primary Care Provider: Gage Jaimes Jr, MD        Subjective:     Principal Problem:Acute pulmonary edema    HPI:  57 year old man with no significant PMH, but is EtOH and tobacco user, presents with 1.5 week history of worsening CERVANTES and orthopnea.  He denies any associated chest pain.  He notes that when he lies down he has developed a choking sensation.  He has had to sleep on two pillow recently.  He also note BLE edema.  He denies any history of DTs.  No fever or recent weight loss.  He is usually active.      CXR had findings consisted with pulmonary edema.  EKG shows SR with PVCs.  He received 40 mg Lasix IV and has been urinating and feels much better currently.        Hospital Course:  No notes on file    Interval History:   Breathing better.  Discussed with Dr. Alarcon.     Review of Systems   Constitutional: Negative for fatigue, fever and unexpected weight change.   HENT: Negative for congestion.    Eyes: Negative for visual disturbance.   Respiratory: Negative for choking and shortness of breath.    Cardiovascular: Negative for chest pain and leg swelling.        2 pillow orthopnea.  CERVANTES.      Gastrointestinal: Negative for abdominal pain, constipation, diarrhea, nausea and vomiting.   Endocrine: Negative for cold intolerance and heat intolerance.   Genitourinary: Negative for difficulty urinating and dysuria.   Musculoskeletal: Negative for arthralgias and gait problem.   Skin: Negative.    Neurological: Negative for dizziness and headaches.   Psychiatric/Behavioral: Negative for agitation and behavioral problems.     Objective:     Vital Signs (Most Recent):  Temp: 97.9 °F (36.6 °C) (02/26/18 1123)  Pulse: 80 (02/26/18 1200)  Resp: 18 (02/26/18 1123)  BP: (!) 112/58  (02/26/18 1123)  SpO2: 99 % (02/26/18 1123) Vital Signs (24h Range):  Temp:  [96.6 °F (35.9 °C)-98.2 °F (36.8 °C)] 97.9 °F (36.6 °C)  Pulse:  [] 80  Resp:  [15-22] 18  SpO2:  [92 %-99 %] 99 %  BP: (112-185)/(56-96) 112/58     Weight: 70.3 kg (155 lb)  Body mass index is 21.62 kg/m².    Intake/Output Summary (Last 24 hours) at 02/26/18 1313  Last data filed at 02/26/18 1114   Gross per 24 hour   Intake                0 ml   Output             2976 ml   Net            -2976 ml      Physical Exam   Constitutional: He is oriented to person, place, and time. He appears well-developed and well-nourished. No distress.   HENT:   Head: Normocephalic and atraumatic.   Eyes: Conjunctivae are normal. Pupils are equal, round, and reactive to light.   Brown   Neck: Normal range of motion. Neck supple. No JVD present.   Cardiovascular: Normal rate and normal heart sounds.    Regular with irregular beat periodic   Pulmonary/Chest: Effort normal. He has no rales.   Abdominal: Soft. Bowel sounds are normal. There is no tenderness.   Musculoskeletal: He exhibits no edema.   Neurological: He is alert and oriented to person, place, and time.   Skin: Skin is warm and dry.   Psychiatric: He has a normal mood and affect. His behavior is normal.       Significant Labs:   A1C:   Recent Labs  Lab 02/25/18 2007   HGBA1C 5.1     BMP:   Recent Labs  Lab 02/26/18  0539   GLU 90      K 3.9      CO2 23   BUN 20   CREATININE 1.1   CALCIUM 8.7   MG 1.6     CBC:   Recent Labs  Lab 02/25/18  1406 02/25/18 2007 02/26/18  0539   WBC 5.26 6.70 6.67   HGB 15.4 14.7 14.2   HCT 43.5 41.8 40.0   PLT 84* 86* 82*     Cardiac Markers:   Recent Labs  Lab 02/25/18  1406   BNP 1,166*     Lipid Panel:   Recent Labs  Lab 02/26/18  0539   CHOL 112*   HDL 39*   LDLCALC 61.8*   TRIG 56   CHOLHDL 34.8     Magnesium:   Recent Labs  Lab 02/25/18  1406 02/26/18  0539   MG 1.8 1.6     TSH:   Recent Labs  Lab 02/25/18  1406   TSH 1.744       Significant  Imaging: I have reviewed all pertinent imaging results/findings within the past 24 hours.   Imaging Results          X-Ray Chest PA And Lateral (Final result)  Result time 02/25/18 14:26:16    Final result by Nyasia Hayden MD (02/25/18 14:26:16)                 Impression:     Abnormal exam, findings may relate to edema or infection.  Correlate with additional clinical findings.      Electronically signed by: Nyasia Hayden MD  Date:     02/25/18  Time:    14:26              Narrative:    2 views obtained.  No comparison study.    The cardiac size is not enlarged.  There is no increase in pulmonary vascularity.  Surgical changes are incompletely visualized in the lower cervical spine.  Osseous structures demonstrate mild spinal curvature.  There are small bilateral pleural effusions.  There are increased interstitial markings with some bibasilar patchy parenchymal opacities, right greater than left.                            Assessment/Plan:      * Acute pulmonary edema    - Suspect secondary to acute CHF.             Bilateral pleural effusion    - Monitor for improvement             Hypertensive urgency    - Treat CHF  - Start BP meds.  - See above.   - Resolved .              Serum total bilirubin elevated    - Noted on past lab.    - Liver US: The liver is sonographically normal without evidence of biliary obstruction.             Current drinker of alcohol    - Counseled to avoid excess.   - Denies any history of DTs.   - Gave thiamine 100 mg IV.             Troponin level elevated    - Mild elevation CK, and CK-MB elevated.  - Got  and Lovenox 70 mg once.   - No chest pain.   - Risk factors include tobacco, and possibly HTN.   - A1c = 5.1,  HDL 39 LDL 62.    - Cardiac enzymes stable.    - Discussed with Dr. Alarcon.             Orthopnea    - Clinically has CHF.             Tobacco abuse    - 1/2 ppd for 40 years.  - Counseled to cease.             Thrombocytopenia    - Platelets were 133  in 2012.  Currently 84.   - Etiology unclear.   - Check COAGs.   - Stable.  - Monitor.            Acute respiratory failure with hypoxia    - Secondary to pulmonary edema secondary to CHF.   - Improving with diuresis.            Acute CHF    - TSH WNL.   - Etiology unclear.  Drinks EtOH, and has some CAD risk factors.   - 2-D ECHO  - Symptoms improving with diuresis.  He is urinating so far close to 2 liters dilute urine after Lasix 40.   - Got IV Lasix 40 in ED and IV Lasix 20 last night.   - Started Coreg 6.25 and losartan 50.   - Start Lasix 40 mg PO today.  Discussed with Dr. Alarcon. - BLE edema better.   - May have cath in AM.   - Monitor electrolytes.             VTE Risk Mitigation         Ordered     enoxaparin injection 40 mg  Daily     Route:  Subcutaneous        02/26/18 1012     Place JOSE hose  Until discontinued      02/25/18 1900              Gagan Abreu MD  Department of Hospital Medicine   [facility]

## 2018-02-26 NOTE — CONSULTS
HISTORY OF PRESENT ILLNESS:  Mr. Lopez is a 57-year-old gentleman who first   noticed progressively increasing ankle swelling for the last few weeks.  Over   the last couple of 3 weeks, he has noticed increasing exertional shortness of   breath.  Over the last couple of days, he has noticed shortness of breath on   lying down, he gets better when he sits up.  He has had a persistent dry cough,   thought he had the flu.  He has had no relief with Robitussin and Aleve.    PAST MEDICAL HISTORY:  He says he has been in good general health.  About five   years ago, he received medical attention for her renal calculus.  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  He denies a history of hypertension or diabetes.    SOCIAL HISTORY:  He has smoked half a pack of cigarettes a day since he was 19   years old.  He drinks on average over a couple of shots alcohol daily.    PHYSICAL EXAMINATION:  GENERAL:  Reveals an alert, pleasant man, in no apparent acute distress.  VITAL SIGNS:  Blood pressure of 127/64 and a pulse of 77 beats per minute.  HEENT:  The sclerae is nonicteric.  The conjunctivae are pink.  The ENT exam is   unremarkable.  NECK:  Supple.  There is no jugular venous distention.  The carotid upstroke is   brisk.  There are no carotid bruits.  CHEST:  Reveals a few crackles at both bases posteriorly.  HEART:  Size is grossly normal.  S1 and S2 are normal.  There is a II/VI early   diastolic murmur at the base.  There is no appreciable gallop.  ABDOMEN:  Soft and nontender.  EXTREMITIES:  There is trace ankle edema.  NEUROLOGIC:  Grossly, the neurological exam is intact.    DIAGNOSTIC STUDIES:  The electrocardiogram shows left atrial enlargement, left   ventricular hypertrophy.  Delayed transition zone suggesting possible   anteroseptal scar.  A BNP level done yesterday was 1166.  A chest x-ray showed   the suggestion of pulmonary  edema.    IMPRESSION:  1.  New onset of congestive heart failure.  2.  Aortic insufficiency.  3.  History of cigarette smoking.  He is subjectively much better after having   received diuretics.    RECOMMENDATIONS:  I would continue diuretics, add an ACE inhibitor and a   beta-blocker, we will review the echocardiogram and discuss with you at length.    Thank you for the opportunity of seeing Mr. Lopez in consultation.      PETE/IN  dd: 02/26/2018 10:18:42 (CST)  td: 02/26/2018 12:15:03 (CST)  Doc ID   #[id number]  Job ID #[id number]    CC:

## 2018-02-26 NOTE — ASSESSMENT & PLAN NOTE
- Platelets were 133 in 2012.  Currently 84.   - Etiology unclear.   - Check COAGs.   - Stable.  - Monitor.

## 2018-02-26 NOTE — ASSESSMENT & PLAN NOTE
- Noted on past lab.    - Liver US: The liver is sonographically normal without evidence of biliary obstruction.

## 2018-02-26 NOTE — PLAN OF CARE
Problem: Patient Care Overview  Goal: Plan of Care Review  Outcome: Ongoing (interventions implemented as appropriate)  Patient vitals WNL, on RA with no complaints of pain. On tele monitor, NSR rhythm. Strict I and O maintained. Seen from time to time, no complaints.

## 2018-02-26 NOTE — ASSESSMENT & PLAN NOTE
- Mild elevation CK, and CK-MB elevated.  - Got  and Lovenox 70 mg once.   - No chest pain.   - Risk factors include tobacco, and possibly HTN.   - A1c = 5.1,  HDL 39 LDL 62.    - Cardiac enzymes stable.    - Discussed with Dr. Alarcon.

## 2018-02-26 NOTE — SUBJECTIVE & OBJECTIVE
Interval History:   Breathing better.  Discussed with Dr. Alarcon.     Review of Systems   Constitutional: Negative for fatigue, fever and unexpected weight change.   HENT: Negative for congestion.    Eyes: Negative for visual disturbance.   Respiratory: Negative for choking and shortness of breath.    Cardiovascular: Negative for chest pain and leg swelling.        2 pillow orthopnea.  CERVANTES.      Gastrointestinal: Negative for abdominal pain, constipation, diarrhea, nausea and vomiting.   Endocrine: Negative for cold intolerance and heat intolerance.   Genitourinary: Negative for difficulty urinating and dysuria.   Musculoskeletal: Negative for arthralgias and gait problem.   Skin: Negative.    Neurological: Negative for dizziness and headaches.   Psychiatric/Behavioral: Negative for agitation and behavioral problems.     Objective:     Vital Signs (Most Recent):  Temp: 97.9 °F (36.6 °C) (02/26/18 1123)  Pulse: 80 (02/26/18 1200)  Resp: 18 (02/26/18 1123)  BP: (!) 112/58 (02/26/18 1123)  SpO2: 99 % (02/26/18 1123) Vital Signs (24h Range):  Temp:  [96.6 °F (35.9 °C)-98.2 °F (36.8 °C)] 97.9 °F (36.6 °C)  Pulse:  [] 80  Resp:  [15-22] 18  SpO2:  [92 %-99 %] 99 %  BP: (112-185)/(56-96) 112/58     Weight: 70.3 kg (155 lb)  Body mass index is 21.62 kg/m².    Intake/Output Summary (Last 24 hours) at 02/26/18 1313  Last data filed at 02/26/18 1114   Gross per 24 hour   Intake                0 ml   Output             2976 ml   Net            -2976 ml      Physical Exam   Constitutional: He is oriented to person, place, and time. He appears well-developed and well-nourished. No distress.   HENT:   Head: Normocephalic and atraumatic.   Eyes: Conjunctivae are normal. Pupils are equal, round, and reactive to light.   Brown   Neck: Normal range of motion. Neck supple. No JVD present.   Cardiovascular: Normal rate and normal heart sounds.    Regular with irregular beat periodic   Pulmonary/Chest: Effort normal. He has no  rales.   Abdominal: Soft. Bowel sounds are normal. There is no tenderness.   Musculoskeletal: He exhibits no edema.   Neurological: He is alert and oriented to person, place, and time.   Skin: Skin is warm and dry.   Psychiatric: He has a normal mood and affect. His behavior is normal.       Significant Labs:   A1C:   Recent Labs  Lab 02/25/18 2007   HGBA1C 5.1     BMP:   Recent Labs  Lab 02/26/18  0539   GLU 90      K 3.9      CO2 23   BUN 20   CREATININE 1.1   CALCIUM 8.7   MG 1.6     CBC:   Recent Labs  Lab 02/25/18  1406 02/25/18 2007 02/26/18  0539   WBC 5.26 6.70 6.67   HGB 15.4 14.7 14.2   HCT 43.5 41.8 40.0   PLT 84* 86* 82*     Cardiac Markers:   Recent Labs  Lab 02/25/18  1406   BNP 1,166*     Lipid Panel:   Recent Labs  Lab 02/26/18  0539   CHOL 112*   HDL 39*   LDLCALC 61.8*   TRIG 56   CHOLHDL 34.8     Magnesium:   Recent Labs  Lab 02/25/18  1406 02/26/18  0539   MG 1.8 1.6     TSH:   Recent Labs  Lab 02/25/18  1406   TSH 1.744       Significant Imaging: I have reviewed all pertinent imaging results/findings within the past 24 hours.   Imaging Results          X-Ray Chest PA And Lateral (Final result)  Result time 02/25/18 14:26:16    Final result by Nyasia Hayden MD (02/25/18 14:26:16)                 Impression:     Abnormal exam, findings may relate to edema or infection.  Correlate with additional clinical findings.      Electronically signed by: Nyasia Hayden MD  Date:     02/25/18  Time:    14:26              Narrative:    2 views obtained.  No comparison study.    The cardiac size is not enlarged.  There is no increase in pulmonary vascularity.  Surgical changes are incompletely visualized in the lower cervical spine.  Osseous structures demonstrate mild spinal curvature.  There are small bilateral pleural effusions.  There are increased interstitial markings with some bibasilar patchy parenchymal opacities, right greater than left.

## 2018-02-27 ENCOUNTER — SURGERY (OUTPATIENT)
Age: 58
End: 2018-02-27

## 2018-02-27 PROBLEM — I16.0 HYPERTENSIVE URGENCY: Status: RESOLVED | Noted: 2018-02-25 | Resolved: 2018-02-27

## 2018-02-27 PROBLEM — I50.41 ACUTE COMBINED SYSTOLIC AND DIASTOLIC CONGESTIVE HEART FAILURE: Status: ACTIVE | Noted: 2018-02-25

## 2018-02-27 LAB
ANION GAP SERPL CALC-SCNC: 9 MMOL/L
APTT BLDCRRT: 29.6 SEC
BNP SERPL-MCNC: 593 PG/ML
BUN SERPL-MCNC: 23 MG/DL
CALCIUM SERPL-MCNC: 8.5 MG/DL
CHLORIDE SERPL-SCNC: 104 MMOL/L
CO2 SERPL-SCNC: 25 MMOL/L
CREAT SERPL-MCNC: 1.2 MG/DL
ERYTHROCYTE [SEDIMENTATION RATE] IN BLOOD BY WESTERGREN METHOD: 2 MM/HR
EST. GFR  (AFRICAN AMERICAN): >60 ML/MIN/1.73 M^2
EST. GFR  (NON AFRICAN AMERICAN): >60 ML/MIN/1.73 M^2
GLUCOSE SERPL-MCNC: 92 MG/DL
INR PPP: 1.1
MAGNESIUM SERPL-MCNC: 1.8 MG/DL
PHOSPHATE SERPL-MCNC: 3.8 MG/DL
POTASSIUM SERPL-SCNC: 4 MMOL/L
PROTHROMBIN TIME: 11.8 SEC
SODIUM SERPL-SCNC: 138 MMOL/L

## 2018-02-27 PROCEDURE — 63600175 PHARM REV CODE 636 W HCPCS: Performed by: HOSPITALIST

## 2018-02-27 PROCEDURE — 36415 COLL VENOUS BLD VENIPUNCTURE: CPT

## 2018-02-27 PROCEDURE — 83880 ASSAY OF NATRIURETIC PEPTIDE: CPT

## 2018-02-27 PROCEDURE — 25000003 PHARM REV CODE 250

## 2018-02-27 PROCEDURE — 85651 RBC SED RATE NONAUTOMATED: CPT

## 2018-02-27 PROCEDURE — 80048 BASIC METABOLIC PNL TOTAL CA: CPT

## 2018-02-27 PROCEDURE — 63600175 PHARM REV CODE 636 W HCPCS

## 2018-02-27 PROCEDURE — 85610 PROTHROMBIN TIME: CPT

## 2018-02-27 PROCEDURE — C1769 GUIDE WIRE: HCPCS

## 2018-02-27 PROCEDURE — 99233 SBSQ HOSP IP/OBS HIGH 50: CPT | Mod: ,,, | Performed by: HOSPITALIST

## 2018-02-27 PROCEDURE — 25500020 PHARM REV CODE 255: Performed by: HOSPITALIST

## 2018-02-27 PROCEDURE — 85730 THROMBOPLASTIN TIME PARTIAL: CPT

## 2018-02-27 PROCEDURE — 84100 ASSAY OF PHOSPHORUS: CPT

## 2018-02-27 PROCEDURE — 25500020 PHARM REV CODE 255

## 2018-02-27 PROCEDURE — G0269 OCCLUSIVE DEVICE IN VEIN ART: HCPCS

## 2018-02-27 PROCEDURE — 83735 ASSAY OF MAGNESIUM: CPT

## 2018-02-27 PROCEDURE — 25000003 PHARM REV CODE 250: Performed by: HOSPITALIST

## 2018-02-27 PROCEDURE — 84153 ASSAY OF PSA TOTAL: CPT

## 2018-02-27 PROCEDURE — 11000001 HC ACUTE MED/SURG PRIVATE ROOM

## 2018-02-27 PROCEDURE — 25000003 PHARM REV CODE 250: Performed by: INTERNAL MEDICINE

## 2018-02-27 RX ORDER — NITROGLYCERIN 0.4 MG/1
0.4 TABLET SUBLINGUAL EVERY 5 MIN PRN
Status: DISCONTINUED | OUTPATIENT
Start: 2018-02-27 | End: 2018-02-28 | Stop reason: HOSPADM

## 2018-02-27 RX ORDER — DIPHENHYDRAMINE HYDROCHLORIDE 50 MG/ML
25 INJECTION INTRAMUSCULAR; INTRAVENOUS EVERY 6 HOURS PRN
Status: DISCONTINUED | OUTPATIENT
Start: 2018-02-27 | End: 2018-02-28 | Stop reason: HOSPADM

## 2018-02-27 RX ORDER — ONDANSETRON 2 MG/ML
4 INJECTION INTRAMUSCULAR; INTRAVENOUS EVERY 12 HOURS PRN
Status: DISCONTINUED | OUTPATIENT
Start: 2018-02-27 | End: 2018-02-28 | Stop reason: HOSPADM

## 2018-02-27 RX ORDER — MAG HYDROX/ALUMINUM HYD/SIMETH 200-200-20
30 SUSPENSION, ORAL (FINAL DOSE FORM) ORAL
Status: DISCONTINUED | OUTPATIENT
Start: 2018-02-27 | End: 2018-02-28 | Stop reason: HOSPADM

## 2018-02-27 RX ORDER — HYDROCODONE BITARTRATE AND ACETAMINOPHEN 5; 325 MG/1; MG/1
1 TABLET ORAL EVERY 4 HOURS PRN
Status: DISCONTINUED | OUTPATIENT
Start: 2018-02-27 | End: 2018-02-28 | Stop reason: HOSPADM

## 2018-02-27 RX ORDER — SODIUM CHLORIDE 9 MG/ML
50 INJECTION, SOLUTION INTRAVENOUS CONTINUOUS
Status: ACTIVE | OUTPATIENT
Start: 2018-02-27 | End: 2018-02-27

## 2018-02-27 RX ORDER — ACETAMINOPHEN 325 MG/1
650 TABLET ORAL EVERY 4 HOURS PRN
Status: DISCONTINUED | OUTPATIENT
Start: 2018-02-27 | End: 2018-02-28 | Stop reason: HOSPADM

## 2018-02-27 RX ORDER — HYDROCODONE BITARTRATE AND ACETAMINOPHEN 10; 325 MG/1; MG/1
1 TABLET ORAL EVERY 4 HOURS PRN
Status: DISCONTINUED | OUTPATIENT
Start: 2018-02-27 | End: 2018-02-28 | Stop reason: HOSPADM

## 2018-02-27 RX ADMIN — SODIUM CHLORIDE 3515 ML: 0.9 INJECTION, SOLUTION INTRAVENOUS at 11:02

## 2018-02-27 RX ADMIN — ENOXAPARIN SODIUM 40 MG: 100 INJECTION SUBCUTANEOUS at 06:02

## 2018-02-27 RX ADMIN — CARVEDILOL 6.25 MG: 6.25 TABLET, FILM COATED ORAL at 08:02

## 2018-02-27 RX ADMIN — IOHEXOL 25 ML: 350 INJECTION, SOLUTION INTRAVENOUS at 02:02

## 2018-02-27 NOTE — PHYSICIAN QUERY
"PT Name: Devin Lopez  MR #: [id number]    Physician Query Form - Heart  Condition Clarification     CDS/: Mally Paige               Contact information:[email or web address]  This form is a permanent document in the medical record.     Query Date: February 27, 2018    By submitting this query, we are merely seeking further clarification of documentation. Please utilize your independent clinical judgment when addressing the question(s) below.    The medical record contains the following   Indicators     Supporting Clinical Findings Location in Medical Record   x BNP BNP = 1166 Labs 2/25     x EF EF 35-40% Echo 2/26     x Radiology findings The cardiac size is not enlarged.  There is no increase in pulmonary vascularity.  Surgical changes are incompletely visualized in the lower cervical spine.  Osseous structures demonstrate mild spinal curvature.  There are small bilateral pleural effusions.  There are increased interstitial markings with some bibasilar patchy parenchymal opacities, right greater than left.   CXR 2/25   x Echo Results 1 - Mild left ventricular enlargement.     2 - Eccentric hypertrophy.     3 - No wall motion abnormalities.     4 - Moderately depressed left ventricular systolic function (EF 35-40%).     5 - Restrictive LV filling pattern, indicating markedly elevated LAP (grade 3 diastolic dysfunction).     6 - Right atrial enlargement.     7 - Pulmonary hypertension. The estimated PA systolic pressure is greater than 47 mmHg.     8 - Moderate aortic regurgitation.     9 - Mild mitral regurgitation.     10 - Mild to moderate tricuspid regurgitation.     11 - Trivial pericardial effusion.  Echo 2/26    "Ascites" documented     x "SOB" or "CERVANTES" documented presents with 1.5 week history of worsening CERVANTES and orthopnea    " I notice when I walk the dog I ahve to stop and sit down becuase I get so tired".   H&P 2/25   x "Hypoxia" documented Acute respiratory failure with hypoxia   HM PN " "2/26   x Heart Failure documented  New onset of congestive heart failure   Cardiology CN 2/26   x "Edema" documented 2+ pitting BLE    H&P 2/25   x Diuretics/Meds Got IV Lasix 40 in ED and IV Lasix 20 last night.   - Started Coreg 6.25 and losartan 50.   - Start Lasix 40 mg PO today.  PN 2/26    Treatment:      Other:          Provider, please specify diagnosis or diagnoses associated with above clinical findings.                               x] Acute Systolic Heart Failure ( EF < 40)*  [  ] Acute Combined Systolic and Diastolic Heart Failure  [  ] Other Type of Heart Failure (please specify type): _________________________  [  ] Heart Failure Ruled Out  [  ] Other (please specify): ___________________________________  [  ] Clinically Undetermined            *American Heart Association                                                                                                          Please document in your progress notes daily for the duration of treatment until resolved and include in your discharge summary.    "

## 2018-02-27 NOTE — H&P (VIEW-ONLY)
HISTORY OF PRESENT ILLNESS:  Mr. Lopez is a 57-year-old gentleman who first   noticed progressively increasing ankle swelling for the last few weeks.  Over   the last couple of 3 weeks, he has noticed increasing exertional shortness of   breath.  Over the last couple of days, he has noticed shortness of breath on   lying down, he gets better when he sits up.  He has had a persistent dry cough,   thought he had the flu.  He has had no relief with Robitussin and Aleve.    PAST MEDICAL HISTORY:  He says he has been in good general health.  About five   years ago, he received medical attention for her renal calculus.  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  He denies a history of hypertension or diabetes.    SOCIAL HISTORY:  He has smoked half a pack of cigarettes a day since he was 19   years old.  He drinks on average over a couple of shots alcohol daily.    PHYSICAL EXAMINATION:  GENERAL:  Reveals an alert, pleasant man, in no apparent acute distress.  VITAL SIGNS:  Blood pressure of 127/64 and a pulse of 77 beats per minute.  HEENT:  The sclerae is nonicteric.  The conjunctivae are pink.  The ENT exam is   unremarkable.  NECK:  Supple.  There is no jugular venous distention.  The carotid upstroke is   brisk.  There are no carotid bruits.  CHEST:  Reveals a few crackles at both bases posteriorly.  HEART:  Size is grossly normal.  S1 and S2 are normal.  There is a II/VI early   diastolic murmur at the base.  There is no appreciable gallop.  ABDOMEN:  Soft and nontender.  EXTREMITIES:  There is trace ankle edema.  NEUROLOGIC:  Grossly, the neurological exam is intact.    DIAGNOSTIC STUDIES:  The electrocardiogram shows left atrial enlargement, left   ventricular hypertrophy.  Delayed transition zone suggesting possible   anteroseptal scar.  A BNP level done yesterday was 1166.  A chest x-ray showed   the suggestion of pulmonary  edema.    IMPRESSION:  1.  New onset of congestive heart failure.  2.  Aortic insufficiency.  3.  History of cigarette smoking.  He is subjectively much better after having   received diuretics.    RECOMMENDATIONS:  I would continue diuretics, add an ACE inhibitor and a   beta-blocker, we will review the echocardiogram and discuss with you at length.    Thank you for the opportunity of seeing Mr. Lopez in consultation.      PETE/IN  dd: 02/26/2018 10:18:42 (CST)  td: 02/26/2018 12:15:03 (CST)  Doc ID   #[id number]  Job ID #[id number]    CC:

## 2018-02-27 NOTE — PROGRESS NOTES
Echo findings discussed with patient and his wife.  Plan: Heart cath., coronary angiography, aortic root injection in am.  Procedure, risks discussed. They understand and want me to proceed.

## 2018-02-27 NOTE — INTERVAL H&P NOTE
The patient has been examined and the H&P has been reviewed:    I concur with the findings and no changes have occurred since H&P was written.    Anesthesia/Surgery risks, benefits and alternative options discussed and understood by patient/family.          Active Hospital Problems    Diagnosis  POA    *Acute pulmonary edema [J81.0]  Yes    Nonrheumatic aortic valve insufficiency [I35.1]  Yes    Bicuspid aortic valve [Q23.1]  Not Applicable    Acute CHF [I50.9]  Yes    Acute respiratory failure with hypoxia [J96.01]  Yes    Thrombocytopenia [D69.6]  Yes    Tobacco abuse [Z72.0]  Yes    Orthopnea [R06.01]  Yes    Troponin level elevated [R74.8]  Yes    Current drinker of alcohol [Z78.9]  Yes    Serum total bilirubin elevated [R17]  Yes    Hypertensive urgency [I16.0]  Yes    Bilateral pleural effusion [J90]  Yes      Resolved Hospital Problems    Diagnosis Date Resolved POA   No resolved problems to display.

## 2018-02-27 NOTE — OR NURSING
Patient to room 319 with transporter. Verbalizes instructions to not use right leg or sit up.  Ice to right groin.  Site clean, no bleeding or hematoma.  Soft to palpate.

## 2018-02-27 NOTE — PLAN OF CARE
CM met with pt at bedside for initial discharge planning assessment. Pt is independent and is anxious to return to work at [employer]. Pt states he will have no CM needs for discharge.  CM to follow and remain supportive.     02/26/18 0700   Discharge Assessment   Assessment Type Discharge Planning Assessment   Confirmed/corrected address and phone number on facesheet? Yes   Assessment information obtained from? Patient;Medical Record   Expected Length of Stay (days) 2   Communicated expected length of stay with patient/caregiver yes   Prior to hospitilization cognitive status: Alert/Oriented   Prior to hospitalization functional status: Independent   Current cognitive status: Alert/Oriented   Current Functional Status: Independent   Lives With spouse   Is patient able to care for self after discharge? Yes   Patient's perception of discharge disposition home or selfcare   Readmission Within The Last 30 Days no previous admission in last 30 days   Patient currently being followed by outpatient case management? No   Patient currently receives any other outside agency services? No   Equipment Currently Used at Home none   Do you have any problems affording any of your prescribed medications? No   Is the patient taking medications as prescribed? yes   Does the patient have transportation home? Yes   Transportation Available car;family or friend will provide   Does the patient receive services at the [facility]? No   Discharge Plan A Home   Discharge Plan B Home   Patient/Family In Agreement With Plan yes

## 2018-02-27 NOTE — PLAN OF CARE
"Problem: Patient Care Overview  Goal: Plan of Care Review  Outcome: Ongoing (interventions implemented as appropriate)  Patient resting in bed at this time, all important items within reach, call light in reach and instructed to call as needed, night light on, nonskid footwear, bed in lowest and locked position, bed alarm used as needed, free of injuries      No family at bedside  Room air  BRP, urinal within reach    Saline lock  No reports of pain at this time   NPO, after midnight for possible cardiac angiogram and aortic root injection with Dr. Lee   Anxious, "but I dont want anything for it"  No wounds   VSS  Tele reading NSR   No further questions or concerns at this time, will cont to monitor         "

## 2018-02-27 NOTE — PROGRESS NOTES
Heart Cath: Dilated LV  Global reduction in LV systolic function  Moderate aortic insufficiency.  Normal coronary arteries.  Unusual appearance of bladder.    Since the amount of heart failure is out of proportion to the degree of AI, would treat with present meds., and follow, rather than send to AVR soon.  Check CT scan of abdomen and pelvis to check bladder

## 2018-02-27 NOTE — HOSPITAL COURSE
Patient was admitted for new onset heart failure and was continued on IV diuresis and heart failure medication.  He was seen by the cardiologist, who heard a typical AR murmur.  2D echo showed a bicuspid aortic valve with moderate aortic regurgitation, LVEF of 35-40% with a restrictive filling pattern indicating grade 3 diastolic dysfunction.  There was also pulmonary hypertension.  Patient underwent left heart cath on 2/27 with results showing clean coronaries and EF 30%.  Consideration was made for aortic valve replacement, but since the severity of his heart failure was worse than the aortic insufficiency it was decided to treat medically for now.    Dr. Alarcon noted an unusual appearance of the bladder during the groin catheterization and ordered a CT to evaluate.  It showed an enlarged prostate with nodular impression on the bladder base.  PSA was ordered and was 0.38.  He did not have any urinary tract symptoms and should follow up with his PCP for any issues regarding this.    At discharge patient was feeling well.  He has been instructed to stop smoking and cut down on alcohol or quit entirely.  He will follow up with the cardiologist as an outpatient on the new medications.

## 2018-02-28 VITALS
TEMPERATURE: 98 F | HEART RATE: 72 BPM | DIASTOLIC BLOOD PRESSURE: 57 MMHG | SYSTOLIC BLOOD PRESSURE: 109 MMHG | BODY MASS INDEX: 21.7 KG/M2 | HEIGHT: 71 IN | RESPIRATION RATE: 18 BRPM | WEIGHT: 155 LBS | OXYGEN SATURATION: 100 %

## 2018-02-28 LAB
ANION GAP SERPL CALC-SCNC: 10 MMOL/L
BUN SERPL-MCNC: 21 MG/DL
CALCIUM SERPL-MCNC: 8.5 MG/DL
CHLORIDE SERPL-SCNC: 105 MMOL/L
CO2 SERPL-SCNC: 22 MMOL/L
COMPLEXED PSA SERPL-MCNC: 0.38 NG/ML
CREAT SERPL-MCNC: 1.1 MG/DL
EST. GFR  (AFRICAN AMERICAN): >60 ML/MIN/1.73 M^2
EST. GFR  (NON AFRICAN AMERICAN): >60 ML/MIN/1.73 M^2
GLUCOSE SERPL-MCNC: 81 MG/DL
MAGNESIUM SERPL-MCNC: 1.7 MG/DL
PHOSPHATE SERPL-MCNC: 3.4 MG/DL
POTASSIUM SERPL-SCNC: 4.2 MMOL/L
SODIUM SERPL-SCNC: 137 MMOL/L

## 2018-02-28 PROCEDURE — 99238 HOSP IP/OBS DSCHRG MGMT 30/<: CPT | Mod: ,,, | Performed by: HOSPITALIST

## 2018-02-28 PROCEDURE — 36415 COLL VENOUS BLD VENIPUNCTURE: CPT

## 2018-02-28 PROCEDURE — 25000003 PHARM REV CODE 250: Performed by: INTERNAL MEDICINE

## 2018-02-28 PROCEDURE — 25000003 PHARM REV CODE 250: Performed by: HOSPITALIST

## 2018-02-28 PROCEDURE — 83735 ASSAY OF MAGNESIUM: CPT

## 2018-02-28 PROCEDURE — 84100 ASSAY OF PHOSPHORUS: CPT

## 2018-02-28 PROCEDURE — 80048 BASIC METABOLIC PNL TOTAL CA: CPT

## 2018-02-28 RX ORDER — LOSARTAN POTASSIUM 50 MG/1
50 TABLET ORAL DAILY
Qty: 30 TABLET | Refills: 3 | Status: SHIPPED | OUTPATIENT
Start: 2018-03-01 | End: 2018-06-27 | Stop reason: SDUPTHER

## 2018-02-28 RX ORDER — FUROSEMIDE 40 MG/1
40 TABLET ORAL DAILY
Qty: 30 TABLET | Refills: 3 | Status: SHIPPED | OUTPATIENT
Start: 2018-03-01 | End: 2018-06-27 | Stop reason: SDUPTHER

## 2018-02-28 RX ORDER — CARVEDILOL 6.25 MG/1
6.25 TABLET ORAL 2 TIMES DAILY
Qty: 60 TABLET | Refills: 3 | Status: SHIPPED | OUTPATIENT
Start: 2018-02-28 | End: 2018-06-27 | Stop reason: SDUPTHER

## 2018-02-28 RX ADMIN — FUROSEMIDE 40 MG: 40 TABLET ORAL at 09:02

## 2018-02-28 RX ADMIN — CARVEDILOL 6.25 MG: 6.25 TABLET, FILM COATED ORAL at 09:02

## 2018-02-28 RX ADMIN — ACETAMINOPHEN 650 MG: 325 TABLET, FILM COATED ORAL at 05:02

## 2018-02-28 RX ADMIN — LOSARTAN POTASSIUM 50 MG: 50 TABLET, FILM COATED ORAL at 09:02

## 2018-02-28 NOTE — NURSING
Discharge instructions reviewed with patient, all questions answered and understanding verbalized.  IV removed and catheter tip intact.  TELE box returned to monitor station.  Wife enroute to pick pt up.

## 2018-02-28 NOTE — PROGRESS NOTES
[facility] Hospital Medicine  Progress Note    Patient Name: Devin Lopez  MRN: [id number]  Patient Class: IP- Inpatient   Admission Date: 2/25/2018  Length of Stay: 2 days  Attending Physician: Rosamaria Mir MD  Primary Care Provider: Gage Jaimes Jr, MD        Subjective:     Principal Problem:Acute pulmonary edema    HPI:  Mr. Lopez is a 57 year old man who presented with 1.5 weeks of worsening CERVANTES and orthopnea without associated chest pain.  He reported that he has developed a choking sensation when he lies down and he has to sleep on 2 pillows.  He has associated BLE edema.  CXR had findings consisted with pulmonary edema and BNP was >1000 without a previous baseline available.  He received 40 mg Lasix IV and was admitted for new onset heart failure.    He has no significant PMH but is a smoker and has a couple shots of liquor/day.  He is usually very active.      Hospital Course:  Patient was admitted for new onset heart failure and was continued on IV diuresis and heart failure medication.  He was seen by the cardiologist, who heard a typical AR murmur.  2D echo showed a bicuspid aortic valve with moderate aortic regurgitation, LVEF of 35-40% with a restrictive filling pattern indicating grade 3 diastolic dysfunction.  There was also pulmonary hypertension.  Patient underwent left heart cath on 2/27 with results showing clean coronaries and EF 30%.  Consideration was made for aortic valve replacement, but since his heart failure was worse than the aortic insufficiency it was decided to treat medically for now.    Dr. Alarcon noted an unusual appearance of the bladder during the groin catheterization and ordered a CT to evaluate.  It showed an enlarged prostate with nodular impression on the bladder base.  PSA was ordered.    Interval History:  Patient is new to me.  He had his cath this morning and is now able to sit up and eat.  Feeling much better overall and breathing  comfortably.  He is happy to hear we are holding off on AVR and treating medically for now.    Review of Systems   Constitutional: Negative for chills and fever.   Respiratory: Negative for cough and shortness of breath.    Cardiovascular: Negative for chest pain and palpitations.     Objective:     Vital Signs (Most Recent):  Temp: 99.2 °F (37.3 °C) (02/27/18 1503)  Pulse: 90 (02/27/18 1503)  Resp: 18 (02/27/18 1503)  BP: 128/69 (02/27/18 1503)  SpO2: 98 % (02/27/18 1503) Vital Signs (24h Range):  Temp:  [97.6 °F (36.4 °C)-99.2 °F (37.3 °C)] 99.2 °F (37.3 °C)  Pulse:  [64-95] 90  Resp:  [16-20] 18  SpO2:  [95 %-100 %] 98 %  BP: (110-129)/(56-76) 128/69     Weight: 70.3 kg (155 lb)  Body mass index is 21.62 kg/m².    Intake/Output Summary (Last 24 hours) at 02/27/18 1806  Last data filed at 02/27/18 1000   Gross per 24 hour   Intake              250 ml   Output              750 ml   Net             -500 ml      Physical Exam   Constitutional: He is oriented to person, place, and time. He appears well-developed and well-nourished.   HENT:   Head: Normocephalic.   Eyes: Conjunctivae are normal. Pupils are equal, round, and reactive to light.   Neck: Neck supple. No thyromegaly present.   Cardiovascular: Normal rate, regular rhythm and intact distal pulses.  Exam reveals no gallop and no friction rub.    Murmur heard.  Pulmonary/Chest: Effort normal and breath sounds normal.   Abdominal: Soft. Bowel sounds are normal. He exhibits no distension. There is no tenderness.   Musculoskeletal: Normal range of motion. He exhibits no edema.   Lymphadenopathy:     He has no cervical adenopathy.   Neurological: He is alert and oriented to person, place, and time.   Strength equal and symmetric   Skin: Skin is warm and dry. No rash noted.   Psychiatric: He has a normal mood and affect. His behavior is normal. Thought content normal.       Significant Labs: All pertinent labs within the past 24 hours have been  reviewed.    Significant Imaging: I have reviewed all pertinent imaging results/findings within the past 24 hours.    Assessment/Plan:      * Acute pulmonary edema    - Due to acute systolic and diastolic heart failure  - Improving with diuresis.        Bicuspid aortic valve              Nonrheumatic aortic valve insufficiency              Current drinker of alcohol    - Counseled to avoid excess.   - Denies any history of DTs.   - Gave thiamine 100 mg IV.             Troponin level elevated    - Mild elevation CK, and CK-MB elevated.  - Got  and Lovenox 70 mg once.   - No chest pain.   - Risk factors include tobacco, and possibly HTN.   - A1c = 5.1,  HDL 39 LDL 62.    - Cardiac enzymes stable.    - Discussed with Dr. Alarcon.             Tobacco abuse    - 1/2 ppd for 40 years.  - Counseled to cease.             Thrombocytopenia    - Platelets were 133 in 2012.  Currently in the 80's   - Etiology unclear - possibly due to alcohol use.           Acute respiratory failure with hypoxia    - Secondary to pulmonary edema due to CHF.   - Improving with diuresis.            Acute combined systolic and diastolic congestive heart failure    - Etiology uncertain with risk factors including smoking and some alcohol use.  - Symptoms improving with diuresis.    - Started Lasix, Coreg 6.25 bid and losartan 50 mg daily.   - Heart cath shows low EF but normal coronaries - continue medical management.          VTE Risk Mitigation         Ordered     enoxaparin injection 40 mg  Daily     Route:  Subcutaneous        02/26/18 1012     Place JOSE hose  Until discontinued      02/25/18 1900              Rosamaria Mcneill MD  Department of Hospital Medicine   [facility]

## 2018-02-28 NOTE — PHYSICIAN QUERY
"PT Name: Devin Lopez  MR #: [id number]    Physician Query Form - Heart  Condition Clarification     CDS/: Mally Paige               Contact information:[email or web address]  This form is a permanent document in the medical record.     Query Date: February 28, 2018    By submitting this query, we are merely seeking further clarification of documentation. Please utilize your independent clinical judgment when addressing the question(s) below.    The medical record contains the following   Indicators     Supporting Clinical Findings Location in Medical Record   x BNP BNP = 1166 Labs 2/25     x EF EF 35-40% Echo 2/26     x Radiology findings The cardiac size is not enlarged.  There is no increase in pulmonary vascularity.  Surgical changes are incompletely visualized in the lower cervical spine.  Osseous structures demonstrate mild spinal curvature.  There are small bilateral pleural effusions.  There are increased interstitial markings with some bibasilar patchy parenchymal opacities, right greater than left.   CXR 2/25   x Echo Results 1 - Mild left ventricular enlargement.     2 - Eccentric hypertrophy.     3 - No wall motion abnormalities.     4 - Moderately depressed left ventricular systolic function (EF 35-40%).     5 - Restrictive LV filling pattern, indicating markedly elevated LAP (grade 3 diastolic dysfunction).     6 - Right atrial enlargement.     7 - Pulmonary hypertension. The estimated PA systolic pressure is greater than 47 mmHg.     8 - Moderate aortic regurgitation.     9 - Mild mitral regurgitation.     10 - Mild to moderate tricuspid regurgitation.     11 - Trivial pericardial effusion.  Echo 2/26    "Ascites" documented     x "SOB" or "CERVANTES" documented presents with 1.5 week history of worsening CERVANTES and orthopnea    " I notice when I walk the dog I ahve to stop and sit down becuase I get so tired".   H&P 2/25   x "Hypoxia" documented Acute respiratory failure with hypoxia   HM PN " "2/26   x Heart Failure documented  New onset of congestive heart failure      Acute Systolic Heart Failure       Acute combined systolic and diastolic congestive heart failure   Cardiology CN 2/26        Query response 2/27         PN 2/27   x "Edema" documented 2+ pitting BLE    H&P 2/25   x Diuretics/Meds Got IV Lasix 40 in ED and IV Lasix 20 last night.   - Started Coreg 6.25 and losartan 50.   - Start Lasix 40 mg PO today.  PN 2/26    Treatment:      Other:          Provider, please specify the type of CHF due to conflicting documentation.                               [  ] Acute Systolic Heart Failure ( EF < 40)*  [ x ] Acute Combined Systolic and Diastolic Heart Failure  [  ] Other Type of Heart Failure (please specify type): _________________________  [  ] Heart Failure Ruled Out  [  ] Other (please specify): ___________________________________  [  ] Clinically Undetermined            *American Heart Association                                                                                                          Please document in your progress notes daily for the duration of treatment until resolved and include in your discharge summary.    "

## 2018-02-28 NOTE — PROGRESS NOTES
OK to discharge on present meds.  Discussed with Dr Mir.  Discussed at length with the patient. Will see for F/U in 2 weeks.

## 2018-02-28 NOTE — ASSESSMENT & PLAN NOTE
- Platelets were 133 in 2012.  Currently in the 80's   - Etiology unclear - possibly due to alcohol use.

## 2018-02-28 NOTE — PLAN OF CARE
Problem: Patient Care Overview  Goal: Plan of Care Review  Outcome: Ongoing (interventions implemented as appropriate)  Patient resting in bed at this time, all important items within reach, call light in reach and instructed to call as needed, night light on, nonskid footwear, bed in lowest and locked position, bed alarm used as needed, free of injuries      No family at bedside  Room air  BRP, urinal within reach    Saline lock  No reports of pain at this time   Right groin site, soft, dried drainage, nontender  Anxious  VSS  Tele reading NSR with freq PVCs and trigeminy, TIA Robreto moonlighter aware    No further questions or concerns at this time, will cont to monitor

## 2018-02-28 NOTE — SUBJECTIVE & OBJECTIVE
Interval History:  Patient is new to me.  He had his cath this morning and is now able to sit up and eat.  Feeling much better overall and breathing comfortably.  He is happy to hear we are holding off on AVR and treating medically for now.    Review of Systems   Constitutional: Negative for chills and fever.   Respiratory: Negative for cough and shortness of breath.    Cardiovascular: Negative for chest pain and palpitations.     Objective:     Vital Signs (Most Recent):  Temp: 99.2 °F (37.3 °C) (02/27/18 1503)  Pulse: 90 (02/27/18 1503)  Resp: 18 (02/27/18 1503)  BP: 128/69 (02/27/18 1503)  SpO2: 98 % (02/27/18 1503) Vital Signs (24h Range):  Temp:  [97.6 °F (36.4 °C)-99.2 °F (37.3 °C)] 99.2 °F (37.3 °C)  Pulse:  [64-95] 90  Resp:  [16-20] 18  SpO2:  [95 %-100 %] 98 %  BP: (110-129)/(56-76) 128/69     Weight: 70.3 kg (155 lb)  Body mass index is 21.62 kg/m².    Intake/Output Summary (Last 24 hours) at 02/27/18 1806  Last data filed at 02/27/18 1000   Gross per 24 hour   Intake              250 ml   Output              750 ml   Net             -500 ml      Physical Exam   Constitutional: He is oriented to person, place, and time. He appears well-developed and well-nourished.   HENT:   Head: Normocephalic.   Eyes: Conjunctivae are normal. Pupils are equal, round, and reactive to light.   Neck: Neck supple. No thyromegaly present.   Cardiovascular: Normal rate, regular rhythm and intact distal pulses.  Exam reveals no gallop and no friction rub.    Murmur heard.  Pulmonary/Chest: Effort normal and breath sounds normal.   Abdominal: Soft. Bowel sounds are normal. He exhibits no distension. There is no tenderness.   Musculoskeletal: Normal range of motion. He exhibits no edema.   Lymphadenopathy:     He has no cervical adenopathy.   Neurological: He is alert and oriented to person, place, and time.   Strength equal and symmetric   Skin: Skin is warm and dry. No rash noted.   Psychiatric: He has a normal mood and  affect. His behavior is normal. Thought content normal.       Significant Labs: All pertinent labs within the past 24 hours have been reviewed.    Significant Imaging: I have reviewed all pertinent imaging results/findings within the past 24 hours.

## 2018-02-28 NOTE — DISCHARGE INSTRUCTIONS
Thank you for choosing [facility] as your Health Care Provider. [facility] strives to provide the best healthcare available to you. In the next few days you may receive a Survey, either by mail or email,  asking you to rate our care that was provided to you during your stay.  Please return the survey to us, as your feedback is important. We aim to meet your expectations of safe, quality health care.    From your [facility] Team.

## 2018-02-28 NOTE — NURSING
Heart failure education reviewed with patient.  I reviewed a basic pathophysiology with him and discussed different types of medications he could be prescribed to help manage his HF.  Diet modifications reviewed.  Low salt diet encouraged.  I stressed the importance of medication compliance and f/u with Dr. Alarcon.  Weight monitoring discussed.  S/S of a flare discussed.  Pt given handouts to help him further understand HF education.  Time allowed for questions.  I encouraged pt to let nurses know if he has any questions prior to discharge.

## 2018-02-28 NOTE — NURSING
Alerted by monitor tech of patient having trigeminy PVC's, asymptomatic    Notified moonarina Roberto NP  Confirmed and verified notes from day team    No further orders at this time  Will cont to monitor

## 2018-02-28 NOTE — ASSESSMENT & PLAN NOTE
- Etiology uncertain with risk factors including smoking and some alcohol use.  - Symptoms improving with diuresis.    - Started Lasix, Coreg 6.25 bid and losartan 50 mg daily.   - Heart cath shows low EF but normal coronaries - continue medical management.

## 2018-03-01 NOTE — PHYSICIAN QUERY
PT Name: Devin Lopez  MR #: [id number]     Physician Query Form - Diagnosis Clarification      CDS/: Mally Paige               Contact information:[email or web address]    This form is a permanent document in the medical record.     Query Date: March 1, 2018    By submitting this query, we are merely seeking further clarification of documentation.  Please utilize your independent clinical judgment when addressing the question(s) below.     The medical record contains the following:      Findings Supporting Clinical Information Location in Medical Record   Acute respiratory failure with hypoxia - Secondary to pulmonary edema secondary to CHF.   - Improving with diuresis  2 pillow orthopnea.  CERVANTES  Regular with irregular beat periodic   Pulmonary/Chest: Effort normal. He has rales.   Bilateral mid to lower lung fields        Positive for cough and shortness of breath  Pulmonary/Chest: Breath sounds normal. No stridor. No respiratory distress. He has no wheezes. He has no rhonchi. He has no rales       He received 40 mg Lasix IV and was admitted for new onset heart failure      O2 Sat = 93%-98% on RA  RR = 15-22      No ABGs found in record H&P 2/25                  ED Provider Notes 2/25            HM PN 2/27        V/S Flowsheet 2/25     Please clarify if the __Acute respiratory failure with hypoxia____ diagnosis has been:    [x  ] Ruled In  [  ] Ruled In, Now Resolved  [  ] Ruled Out  [  ] Clinically insignificant  [  ] Clinically undetermined  [  ] Other/Clarification of findings (please specify)_______________________________    Please document in your progress notes daily for the duration of treatment, until resolved, and include in your discharge summary.            It's in my problem list already, not sure what you are questioning.

## 2018-03-01 NOTE — DISCHARGE SUMMARY
[facility] Hospital Medicine  Discharge Summary      Patient Name: Devin Lopez  MRN: [id number]  Admission Date: 2/25/2018  Hospital Length of Stay: 3 days  Discharge Date and Time: 2/28/2018  1:32 PM  Attending Physician: No att. providers found   Discharging Provider: Rosamaria Mcneill MD  Primary Care Provider: Gage Jaimes Jr, MD      HPI:   Mr. Lopez is a 57 year old man who presented with 1.5 weeks of worsening CERVANTES and orthopnea without associated chest pain.  He reported that he has developed a choking sensation when he lies down and he has to sleep on 2 pillows.  He has associated BLE edema.  CXR had findings consisted with pulmonary edema and BNP was >1000 without a previous baseline available.  He received 40 mg Lasix IV and was admitted for new onset heart failure.    He has no significant PMH but is a smoker and has a couple shots of liquor/day.  He is usually very active.      Procedure(s) (LRB):  HEART CATH-RIGHT & LEFT (Right)      Hospital Course:   Patient was admitted for new onset heart failure and was continued on IV diuresis and heart failure medication.  He was seen by the cardiologist, who heard a typical AR murmur.  2D echo showed a bicuspid aortic valve with moderate aortic regurgitation, LVEF of 35-40% with a restrictive filling pattern indicating grade 3 diastolic dysfunction.  There was also pulmonary hypertension.  Patient underwent left heart cath on 2/27 with results showing clean coronaries and EF 30%.  Consideration was made for aortic valve replacement, but since the severity of his heart failure was worse than the aortic insufficiency it was decided to treat medically for now.    Dr. Alarcon noted an unusual appearance of the bladder during the groin catheterization and ordered a CT to evaluate.  It showed an enlarged prostate with nodular impression on the bladder base.  PSA was ordered and was 0.38.  He did not have any urinary tract symptoms and should  follow up with his PCP for any issues regarding this.    At discharge patient was feeling well.  He has been instructed to stop smoking and cut down on alcohol or quit entirely.  He will follow up with the cardiologist as an outpatient on the new medications.     Consults:   Consults         Status Ordering Provider     Inpatient consult to Cardiology  Once     Provider:  Jarred Alarcon MD    Completed ANUJ MISHRA            Final Active Diagnoses:    Diagnosis Date Noted POA    PRINCIPAL PROBLEM:  Acute pulmonary edema [J81.0] 02/25/2018 Yes    Nonrheumatic aortic valve insufficiency [I35.1] 02/26/2018 Yes    Bicuspid aortic valve [Q23.1] 02/26/2018 Not Applicable    Acute combined systolic and diastolic congestive heart failure [I50.41] 02/25/2018 Yes    Acute respiratory failure with hypoxia [J96.01] 02/25/2018 Yes    Thrombocytopenia [D69.6] 02/25/2018 Yes    Tobacco abuse [Z72.0] 02/25/2018 Yes    Troponin level elevated [R74.8] 02/25/2018 Yes    Current drinker of alcohol [Z78.9] 02/25/2018 Yes      Problems Resolved During this Admission:    Diagnosis Date Noted Date Resolved POA    Hypertensive urgency [I16.0] 02/25/2018 02/27/2018 Yes       Discharged Condition: stable    Disposition: Home or Self Care    Follow Up:  Follow-up Information     Gage Jaimes Jr, MD.    Specialty:  Internal Medicine  Why:  Follow up in 1-2 weeks  Contact information:  [address] [facility] [id number] [phone]             Jarred Alarcon MD In 2 weeks.    Specialty:  Cardiology  Contact information:  [address]  [facility] [address] [phone]                 Patient Instructions:     Diet Cardiac     Activity as tolerated       Medications:  Reconciled Home Medications:   Discharge Medication List as of 2/28/2018 12:09 PM      START taking these medications    Details   carvedilol (COREG) 6.25 MG tablet Take 1 tablet (6.25 mg total) by mouth 2 (two) times daily., Starting Wed 2/28/2018,  Until Thu 2/28/2019, Normal      furosemide (LASIX) 40 MG tablet Take 1 tablet (40 mg total) by mouth once daily., Starting Thu 3/1/2018, Until Fri 3/1/2019, Normal      losartan (COZAAR) 50 MG tablet Take 1 tablet (50 mg total) by mouth once daily., Starting Thu 3/1/2018, Until Fri 3/1/2019, Normal             Time spent on the discharge of patient: <30 minutes  Patient was seen and examined on the date of discharge and determined to be suitable for discharge.         Rosamaria Mcneill MD  Department of Hospital Medicine  [facility]

## 2018-03-07 ENCOUNTER — TELEPHONE (OUTPATIENT)
Dept: FAMILY MEDICINE | Facility: CLINIC | Age: 58
End: 2018-03-07

## 2018-03-15 ENCOUNTER — OFFICE VISIT (OUTPATIENT)
Dept: CARDIOLOGY | Facility: CLINIC | Age: 58
End: 2018-03-15
Attending: INTERNAL MEDICINE
Payer: COMMERCIAL

## 2018-03-15 VITALS
HEART RATE: 73 BPM | WEIGHT: 142 LBS | DIASTOLIC BLOOD PRESSURE: 71 MMHG | SYSTOLIC BLOOD PRESSURE: 117 MMHG | BODY MASS INDEX: 19.88 KG/M2 | HEIGHT: 71 IN

## 2018-03-15 DIAGNOSIS — I50.41 ACUTE COMBINED SYSTOLIC AND DIASTOLIC CONGESTIVE HEART FAILURE: Primary | ICD-10-CM

## 2018-03-15 DIAGNOSIS — Z72.0 TOBACCO ABUSE: ICD-10-CM

## 2018-03-15 DIAGNOSIS — Q23.1 BICUSPID AORTIC VALVE: ICD-10-CM

## 2018-03-15 DIAGNOSIS — I35.1 NONRHEUMATIC AORTIC VALVE INSUFFICIENCY: ICD-10-CM

## 2018-03-15 PROCEDURE — 3074F SYST BP LT 130 MM HG: CPT | Mod: CPTII,S$GLB,, | Performed by: INTERNAL MEDICINE

## 2018-03-15 PROCEDURE — 3078F DIAST BP <80 MM HG: CPT | Mod: CPTII,S$GLB,, | Performed by: INTERNAL MEDICINE

## 2018-03-15 PROCEDURE — 99213 OFFICE O/P EST LOW 20 MIN: CPT | Mod: S$GLB,,, | Performed by: INTERNAL MEDICINE

## 2018-03-16 NOTE — PROGRESS NOTES
"Subjective:    Patient ID:  Devin Lopez is a 58 y.o. male     HPI  Here for F/U of recent hospitalization for Acute CHF, AI, severely dilated LV with poor LVEF.    I feel great! I walk wherever I want to go, and I'm not short of breath.Not smoking or drinking!    Review of Systems   Constitution: Negative for chills, decreased appetite, fever, weight gain and weight loss.   HENT: Negative for congestion, hearing loss and sore throat.    Eyes: Negative for blurred vision, double vision and visual disturbance.   Cardiovascular: Negative for chest pain, claudication, dyspnea on exertion, leg swelling, palpitations and syncope.   Respiratory: Negative for cough, hemoptysis, shortness of breath, sputum production and wheezing.    Endocrine: Negative for cold intolerance and heat intolerance.   Hematologic/Lymphatic: Negative for bleeding problem. Does not bruise/bleed easily.   Skin: Negative for color change, dry skin, flushing and itching.   Musculoskeletal: Negative for back pain, joint pain and myalgias.   Gastrointestinal: Negative for abdominal pain, anorexia, constipation, diarrhea, dysphagia, nausea and vomiting.        No bleeding per rectum   Genitourinary: Negative for dysuria, flank pain, frequency, hematuria and nocturia.   Neurological: Negative for dizziness, headaches, light-headedness, loss of balance, seizures and tremors.   Psychiatric/Behavioral: Negative for altered mental status and depression.         Vitals:    03/15/18 1443   BP: 117/71   Pulse: 73   Weight: 64.4 kg (142 lb)   Height: 5' 11" (1.803 m)     Objective:    Physical Exam   Constitutional: He is oriented to person, place, and time. He appears well-developed and well-nourished.   HENT:   Head: Normocephalic and atraumatic.   Right Ear: External ear normal.   Left Ear: External ear normal.   Nose: Nose normal.   Eyes: Conjunctivae and EOM are normal. Pupils are equal, round, and reactive to light. No scleral icterus.   Neck: Normal " range of motion. Neck supple. No JVD present. No tracheal deviation present. No thyromegaly present.   Cardiovascular: Normal rate and regular rhythm.  Exam reveals no gallop and no friction rub.    Murmur heard.  Basal early diastolic murmur.   Pulmonary/Chest: Effort normal and breath sounds normal. No respiratory distress. He has no rales. He exhibits no tenderness.   Abdominal: Soft. Bowel sounds are normal. He exhibits no distension and no mass. There is no tenderness.   Musculoskeletal: Normal range of motion. He exhibits no edema or tenderness.   Lymphadenopathy:     He has no cervical adenopathy.   Neurological: He is alert and oriented to person, place, and time. He has normal reflexes. No cranial nerve deficit. Coordination normal.   Skin: Skin is warm and dry. No rash noted.   Psychiatric: He has a normal mood and affect. His behavior is normal.         Assessment:       1. Acute combined systolic and diastolic congestive heart failure    2. Nonrheumatic aortic valve insufficiency    3. Bicuspid aortic valve    4. Tobacco abuse         Plan:       Stable  F/U Echo in 6 weeks.

## 2018-04-25 ENCOUNTER — CLINICAL SUPPORT (OUTPATIENT)
Dept: CARDIOLOGY | Facility: CLINIC | Age: 58
End: 2018-04-25
Payer: COMMERCIAL

## 2018-04-25 ENCOUNTER — OFFICE VISIT (OUTPATIENT)
Dept: CARDIOLOGY | Facility: CLINIC | Age: 58
End: 2018-04-25
Attending: INTERNAL MEDICINE
Payer: COMMERCIAL

## 2018-04-25 VITALS
WEIGHT: 145 LBS | BODY MASS INDEX: 20.3 KG/M2 | DIASTOLIC BLOOD PRESSURE: 51 MMHG | HEART RATE: 65 BPM | HEIGHT: 71 IN | SYSTOLIC BLOOD PRESSURE: 106 MMHG

## 2018-04-25 DIAGNOSIS — Q23.1 BICUSPID AORTIC VALVE: ICD-10-CM

## 2018-04-25 DIAGNOSIS — I50.41 ACUTE COMBINED SYSTOLIC AND DIASTOLIC CONGESTIVE HEART FAILURE: Primary | ICD-10-CM

## 2018-04-25 DIAGNOSIS — Z72.0 TOBACCO ABUSE: ICD-10-CM

## 2018-04-25 DIAGNOSIS — I35.1 NONRHEUMATIC AORTIC VALVE INSUFFICIENCY: Primary | ICD-10-CM

## 2018-04-25 DIAGNOSIS — I35.1 NONRHEUMATIC AORTIC (VALVE) INSUFFICIENCY: ICD-10-CM

## 2018-04-25 DIAGNOSIS — I50.41 ACUTE COMBINED SYSTOLIC AND DIASTOLIC CONGESTIVE HEART FAILURE: ICD-10-CM

## 2018-04-25 PROCEDURE — 99214 OFFICE O/P EST MOD 30 MIN: CPT | Mod: S$GLB,,, | Performed by: INTERNAL MEDICINE

## 2018-04-25 PROCEDURE — 93306 TTE W/DOPPLER COMPLETE: CPT | Mod: S$GLB,,, | Performed by: INTERNAL MEDICINE

## 2018-04-25 NOTE — PROGRESS NOTES
"Subjective:    Patient ID:  Devin Lopez is a 58 y.o. male     HPI  Here for F/U of CHF, AI, smoker.    I feel so much better. No more breathlessness. I have quit smoking.    Current Outpatient Prescriptions   Medication Sig    carvedilol (COREG) 6.25 MG tablet Take 1 tablet (6.25 mg total) by mouth 2 (two) times daily.    furosemide (LASIX) 40 MG tablet Take 1 tablet (40 mg total) by mouth once daily.    losartan (COZAAR) 50 MG tablet Take 1 tablet (50 mg total) by mouth once daily.     No current facility-administered medications for this visit.          Review of Systems   Constitution: Negative for chills, decreased appetite, fever, weight gain and weight loss.   HENT: Negative for congestion, hearing loss and sore throat.    Eyes: Negative for blurred vision, double vision and visual disturbance.   Cardiovascular: Negative for chest pain, claudication, dyspnea on exertion, leg swelling, palpitations and syncope.   Respiratory: Negative for cough, hemoptysis, shortness of breath, sputum production and wheezing.    Endocrine: Negative for cold intolerance and heat intolerance.   Hematologic/Lymphatic: Negative for bleeding problem. Does not bruise/bleed easily.   Skin: Negative for color change, dry skin, flushing and itching.   Musculoskeletal: Negative for back pain, joint pain and myalgias.   Gastrointestinal: Negative for abdominal pain, anorexia, constipation, diarrhea, dysphagia, nausea and vomiting.        No bleeding per rectum   Genitourinary: Negative for dysuria, flank pain, frequency, hematuria and nocturia.   Neurological: Negative for dizziness, headaches, light-headedness, loss of balance, seizures and tremors.   Psychiatric/Behavioral: Negative for altered mental status and depression.         Vitals:    04/25/18 0949   BP: (!) 106/51   Pulse: 65   Weight: 65.8 kg (145 lb)   Height: 5' 11" (1.803 m)     Objective:    Physical Exam   Constitutional: He is oriented to person, place, and time. " He appears well-developed and well-nourished.   HENT:   Head: Normocephalic and atraumatic.   Right Ear: External ear normal.   Left Ear: External ear normal.   Nose: Nose normal.   Eyes: Conjunctivae and EOM are normal. Pupils are equal, round, and reactive to light. No scleral icterus.   Neck: Normal range of motion. Neck supple. No JVD present. No tracheal deviation present. No thyromegaly present.   Cardiovascular: Normal rate and regular rhythm.  Exam reveals no gallop and no friction rub.    Murmur heard.  Early diastolic murmur++. No gallop   Pulmonary/Chest: Effort normal and breath sounds normal. No respiratory distress. He has no rales. He exhibits no tenderness.   Abdominal: Soft. Bowel sounds are normal. He exhibits no distension and no mass. There is no tenderness.   Musculoskeletal: Normal range of motion. He exhibits no edema or tenderness.   Lymphadenopathy:     He has no cervical adenopathy.   Neurological: He is alert and oriented to person, place, and time. He has normal reflexes. No cranial nerve deficit. Coordination normal.   Skin: Skin is warm and dry. No rash noted.   Psychiatric: He has a normal mood and affect. His behavior is normal.     Reviewed Echo and discussed with patient.      Assessment:       1. Nonrheumatic aortic valve insufficiency    2. Bicuspid aortic valve    3. Acute combined systolic and diastolic congestive heart failure    4. Tobacco abuse         Plan:       Continue present meds.

## 2018-05-14 DIAGNOSIS — Q23.1 BICUSPID AORTIC VALVE: ICD-10-CM

## 2018-05-28 PROBLEM — J81.0 ACUTE PULMONARY EDEMA: Status: RESOLVED | Noted: 2018-02-25 | Resolved: 2018-05-28

## 2018-06-12 ENCOUNTER — OFFICE VISIT (OUTPATIENT)
Dept: CARDIOLOGY | Facility: CLINIC | Age: 58
End: 2018-06-12
Attending: INTERNAL MEDICINE
Payer: COMMERCIAL

## 2018-06-12 VITALS
HEART RATE: 70 BPM | HEIGHT: 71 IN | BODY MASS INDEX: 21 KG/M2 | SYSTOLIC BLOOD PRESSURE: 117 MMHG | DIASTOLIC BLOOD PRESSURE: 50 MMHG | WEIGHT: 150 LBS

## 2018-06-12 DIAGNOSIS — Q23.1 BICUSPID AORTIC VALVE: ICD-10-CM

## 2018-06-12 DIAGNOSIS — Z87.891 EX-SMOKER: ICD-10-CM

## 2018-06-12 DIAGNOSIS — I35.1 NONRHEUMATIC AORTIC VALVE INSUFFICIENCY: ICD-10-CM

## 2018-06-12 DIAGNOSIS — I50.41 ACUTE COMBINED SYSTOLIC AND DIASTOLIC CONGESTIVE HEART FAILURE: Primary | ICD-10-CM

## 2018-06-12 PROCEDURE — 3008F BODY MASS INDEX DOCD: CPT | Mod: CPTII,S$GLB,, | Performed by: INTERNAL MEDICINE

## 2018-06-12 PROCEDURE — 99214 OFFICE O/P EST MOD 30 MIN: CPT | Mod: S$GLB,,, | Performed by: INTERNAL MEDICINE

## 2018-06-12 NOTE — PROGRESS NOTES
"Subjective:    Patient ID:  Devin Lopez is a 58 y.o. male     HPI  here for follow-up of bicuspid aortic valve, aortic insufficiency, combined systolic and diastolic congestive heart failure, ex smoker.    .  I am feeling well.  I have no breathlessness when I get around.    Current Outpatient Prescriptions   Medication Sig    carvedilol (COREG) 6.25 MG tablet Take 1 tablet (6.25 mg total) by mouth 2 (two) times daily.    furosemide (LASIX) 40 MG tablet Take 1 tablet (40 mg total) by mouth once daily.    losartan (COZAAR) 50 MG tablet Take 1 tablet (50 mg total) by mouth once daily.     No current facility-administered medications for this visit.          Review of Systems   Constitution: Negative for chills, decreased appetite, fever, weight gain and weight loss.   HENT: Negative for congestion, hearing loss and sore throat.    Eyes: Negative for blurred vision, double vision and visual disturbance.   Cardiovascular: Negative for chest pain, claudication, dyspnea on exertion, leg swelling, palpitations and syncope.   Respiratory: Negative for cough, hemoptysis, shortness of breath, sputum production and wheezing.    Endocrine: Negative for cold intolerance and heat intolerance.   Hematologic/Lymphatic: Negative for bleeding problem. Does not bruise/bleed easily.   Skin: Negative for color change, dry skin, flushing and itching.   Musculoskeletal: Negative for back pain, joint pain and myalgias.   Gastrointestinal: Negative for abdominal pain, anorexia, constipation, diarrhea, dysphagia, nausea and vomiting.        No bleeding per rectum   Genitourinary: Negative for dysuria, flank pain, frequency, hematuria and nocturia.   Neurological: Negative for dizziness, headaches, light-headedness, loss of balance, seizures and tremors.   Psychiatric/Behavioral: Negative for altered mental status and depression.         Vitals:    06/12/18 1313   BP: (!) 117/50   Pulse: 70   Weight: 68 kg (150 lb)   Height: 5' 11" " (1.803 m)     Objective:    Physical Exam   Constitutional: He is oriented to person, place, and time. He appears well-developed and well-nourished.   HENT:   Head: Normocephalic and atraumatic.   Right Ear: External ear normal.   Left Ear: External ear normal.   Nose: Nose normal.   Eyes: Conjunctivae and EOM are normal. Pupils are equal, round, and reactive to light. No scleral icterus.   Neck: Normal range of motion. Neck supple. No JVD present. No tracheal deviation present. No thyromegaly present.   Cardiovascular: Normal rate and regular rhythm.  Exam reveals no gallop and no friction rub.    Murmur heard.  Basal ejection systolic murmur and early diastolic murmur.   Pulmonary/Chest: Effort normal and breath sounds normal. No respiratory distress. He has no rales. He exhibits no tenderness.   Abdominal: Soft. Bowel sounds are normal. He exhibits no distension and no mass. There is no tenderness.   Musculoskeletal: Normal range of motion. He exhibits no edema or tenderness.   Lymphadenopathy:     He has no cervical adenopathy.   Neurological: He is alert and oriented to person, place, and time. He has normal reflexes. No cranial nerve deficit. Coordination normal.   Skin: Skin is warm and dry. No rash noted.   Psychiatric: He has a normal mood and affect. His behavior is normal.         Assessment:       1. Acute combined systolic and diastolic congestive heart failure    2. Bicuspid aortic valve    3. Nonrheumatic aortic valve insufficiency    4. Ex-smoker         Plan:       Stable.  Continue the same medications.    Will get a follow-up echocardiogram on the next visit.

## 2018-06-27 DIAGNOSIS — I10 ESSENTIAL HYPERTENSION: Primary | ICD-10-CM

## 2018-06-27 RX ORDER — LOSARTAN POTASSIUM 50 MG/1
50 TABLET ORAL DAILY
Qty: 30 TABLET | Refills: 3 | Status: SHIPPED | OUTPATIENT
Start: 2018-06-27 | End: 2018-10-28 | Stop reason: SDUPTHER

## 2018-06-27 RX ORDER — FUROSEMIDE 40 MG/1
40 TABLET ORAL DAILY
Qty: 30 TABLET | Refills: 3 | Status: SHIPPED | OUTPATIENT
Start: 2018-06-27 | End: 2018-10-28 | Stop reason: SDUPTHER

## 2018-06-27 RX ORDER — CARVEDILOL 6.25 MG/1
6.25 TABLET ORAL 2 TIMES DAILY
Qty: 60 TABLET | Refills: 3 | Status: SHIPPED | OUTPATIENT
Start: 2018-06-27 | End: 2018-10-28 | Stop reason: SDUPTHER

## 2018-09-14 ENCOUNTER — CLINICAL SUPPORT (OUTPATIENT)
Dept: CARDIOLOGY | Facility: CLINIC | Age: 58
End: 2018-09-14
Payer: COMMERCIAL

## 2018-09-14 DIAGNOSIS — Q23.1 BICUSPID AORTIC VALVE: ICD-10-CM

## 2018-09-14 DIAGNOSIS — I50.41 ACUTE COMBINED SYSTOLIC AND DIASTOLIC CONGESTIVE HEART FAILURE: ICD-10-CM

## 2018-09-14 PROCEDURE — 93306 TTE W/DOPPLER COMPLETE: CPT | Mod: S$GLB,,, | Performed by: INTERNAL MEDICINE

## 2018-09-17 DIAGNOSIS — I50.41 ACUTE COMBINED SYSTOLIC AND DIASTOLIC CONGESTIVE HEART FAILURE: ICD-10-CM

## 2018-09-17 DIAGNOSIS — Q23.1 BICUSPID AORTIC VALVE: ICD-10-CM

## 2018-09-27 ENCOUNTER — OFFICE VISIT (OUTPATIENT)
Dept: CARDIOLOGY | Facility: CLINIC | Age: 58
End: 2018-09-27
Attending: INTERNAL MEDICINE
Payer: COMMERCIAL

## 2018-09-27 VITALS
HEIGHT: 71 IN | DIASTOLIC BLOOD PRESSURE: 62 MMHG | BODY MASS INDEX: 21 KG/M2 | SYSTOLIC BLOOD PRESSURE: 127 MMHG | WEIGHT: 150 LBS | HEART RATE: 68 BPM

## 2018-09-27 DIAGNOSIS — I50.42 CHRONIC COMBINED SYSTOLIC AND DIASTOLIC CONGESTIVE HEART FAILURE: ICD-10-CM

## 2018-09-27 DIAGNOSIS — I35.1 NONRHEUMATIC AORTIC VALVE INSUFFICIENCY: Primary | ICD-10-CM

## 2018-09-27 DIAGNOSIS — Q23.1 BICUSPID AORTIC VALVE: ICD-10-CM

## 2018-09-27 DIAGNOSIS — Z87.891 EX-SMOKER: ICD-10-CM

## 2018-09-27 PROCEDURE — 3074F SYST BP LT 130 MM HG: CPT | Mod: CPTII,S$GLB,, | Performed by: INTERNAL MEDICINE

## 2018-09-27 PROCEDURE — 3008F BODY MASS INDEX DOCD: CPT | Mod: CPTII,S$GLB,, | Performed by: INTERNAL MEDICINE

## 2018-09-27 PROCEDURE — 3078F DIAST BP <80 MM HG: CPT | Mod: CPTII,S$GLB,, | Performed by: INTERNAL MEDICINE

## 2018-09-27 PROCEDURE — 99214 OFFICE O/P EST MOD 30 MIN: CPT | Mod: S$GLB,,, | Performed by: INTERNAL MEDICINE

## 2018-09-27 NOTE — PROGRESS NOTES
Subjective:    Patient ID:  Devin Lopez is a 58 y.o. male     HPI   Here for follow-up of congestive heart failure, in February of 2018 was admitted with acute congestive heart failure, respiratory failure, moderate aortic insufficiency, bicuspid aortic valve.  Is now and ex smoker.    I feel well.  Have no complaints.  I get around without getting short of breath.    Current Outpatient Medications   Medication Sig    carvedilol (COREG) 6.25 MG tablet Take 1 tablet (6.25 mg total) by mouth 2 (two) times daily.    furosemide (LASIX) 40 MG tablet Take 1 tablet (40 mg total) by mouth once daily.    losartan (COZAAR) 50 MG tablet Take 1 tablet (50 mg total) by mouth once daily.     No current facility-administered medications for this visit.          Review of Systems   Constitution: Negative for chills, decreased appetite, fever, weight gain and weight loss.   HENT: Negative for congestion, hearing loss and sore throat.    Eyes: Negative for blurred vision, double vision and visual disturbance.   Cardiovascular: Negative for chest pain, claudication, dyspnea on exertion, leg swelling, palpitations and syncope.   Respiratory: Negative for cough, hemoptysis, shortness of breath, sputum production and wheezing.    Endocrine: Negative for cold intolerance and heat intolerance.   Hematologic/Lymphatic: Negative for bleeding problem. Does not bruise/bleed easily.   Skin: Negative for color change, dry skin, flushing and itching.   Musculoskeletal: Negative for back pain, joint pain and myalgias.   Gastrointestinal: Negative for abdominal pain, anorexia, constipation, diarrhea, dysphagia, nausea and vomiting.        No bleeding per rectum   Genitourinary: Negative for dysuria, flank pain, frequency, hematuria and nocturia.   Neurological: Negative for dizziness, headaches, light-headedness, loss of balance, seizures and tremors.   Psychiatric/Behavioral: Negative for altered mental status and depression.        "  Vitals:    09/27/18 1232   BP: 127/62   Pulse: 68   Weight: 68 kg (150 lb)   Height: 5' 11" (1.803 m)     Objective:    Physical Exam   Constitutional: He is oriented to person, place, and time. He appears well-developed and well-nourished.   HENT:   Head: Normocephalic and atraumatic.   Right Ear: External ear normal.   Left Ear: External ear normal.   Nose: Nose normal.   Eyes: Conjunctivae and EOM are normal. Pupils are equal, round, and reactive to light. No scleral icterus.   Neck: Normal range of motion. Neck supple. No JVD present. No tracheal deviation present. No thyromegaly present.   Cardiovascular: Normal rate and regular rhythm. Exam reveals no gallop and no friction rub.   Murmur heard.  Early diastolic murmur   Pulmonary/Chest: Effort normal and breath sounds normal. No respiratory distress. He has no rales. He exhibits no tenderness.   Abdominal: Soft. Bowel sounds are normal. He exhibits no distension and no mass. There is no tenderness.   Musculoskeletal: Normal range of motion. He exhibits no edema or tenderness.   Lymphadenopathy:     He has no cervical adenopathy.   Neurological: He is alert and oriented to person, place, and time. He has normal reflexes. No cranial nerve deficit. Coordination normal.   Skin: Skin is warm and dry. No rash noted.   Psychiatric: He has a normal mood and affect. His behavior is normal.       echocardiogram reviewed.  Left ventricular internal cavitary diameter is 6.0.  Moderately severe aortic insufficiency.    Assessment:       1. Nonrheumatic aortic valve insufficiency    2. Bicuspid aortic valve    3. Chronic combined systolic and diastolic congestive heart failure    4. Ex-smoker         Plan:       Continue the present regimen of medications, will repeat an echocardiogram in 3 months.  If he has symptoms of heart failure, or any increase in the left ventricular internal cavitary diameter, will consider aortic valve replacement.            "

## 2018-10-28 DIAGNOSIS — I10 ESSENTIAL HYPERTENSION: ICD-10-CM

## 2018-10-29 RX ORDER — FUROSEMIDE 40 MG/1
TABLET ORAL
Qty: 30 TABLET | Refills: 0 | Status: SHIPPED | OUTPATIENT
Start: 2018-10-29 | End: 2018-11-26 | Stop reason: SDUPTHER

## 2018-10-29 RX ORDER — CARVEDILOL 6.25 MG/1
TABLET ORAL
Qty: 60 TABLET | Refills: 0 | Status: SHIPPED | OUTPATIENT
Start: 2018-10-29 | End: 2018-11-26 | Stop reason: SDUPTHER

## 2018-10-29 RX ORDER — LOSARTAN POTASSIUM 50 MG/1
TABLET ORAL
Qty: 30 TABLET | Refills: 0 | Status: SHIPPED | OUTPATIENT
Start: 2018-10-29 | End: 2018-11-26 | Stop reason: SDUPTHER

## 2018-11-26 DIAGNOSIS — I10 ESSENTIAL HYPERTENSION: ICD-10-CM

## 2018-11-27 RX ORDER — CARVEDILOL 6.25 MG/1
TABLET ORAL
Qty: 60 TABLET | Refills: 0 | Status: SHIPPED | OUTPATIENT
Start: 2018-11-27 | End: 2018-12-26 | Stop reason: SDUPTHER

## 2018-11-27 RX ORDER — FUROSEMIDE 40 MG/1
TABLET ORAL
Qty: 30 TABLET | Refills: 0 | Status: SHIPPED | OUTPATIENT
Start: 2018-11-27 | End: 2018-12-26 | Stop reason: SDUPTHER

## 2018-11-27 RX ORDER — LOSARTAN POTASSIUM 50 MG/1
TABLET ORAL
Qty: 30 TABLET | Refills: 0 | Status: SHIPPED | OUTPATIENT
Start: 2018-11-27 | End: 2018-12-26 | Stop reason: SDUPTHER

## 2018-12-26 DIAGNOSIS — I10 ESSENTIAL HYPERTENSION: ICD-10-CM

## 2018-12-26 RX ORDER — FUROSEMIDE 40 MG/1
TABLET ORAL
Qty: 30 TABLET | Refills: 5 | Status: ON HOLD | OUTPATIENT
Start: 2018-12-26 | End: 2019-04-10 | Stop reason: HOSPADM

## 2018-12-26 RX ORDER — CARVEDILOL 6.25 MG/1
TABLET ORAL
Qty: 60 TABLET | Refills: 5 | Status: ON HOLD | OUTPATIENT
Start: 2018-12-26 | End: 2019-04-10 | Stop reason: HOSPADM

## 2018-12-26 RX ORDER — LOSARTAN POTASSIUM 50 MG/1
TABLET ORAL
Qty: 30 TABLET | Refills: 5 | Status: ON HOLD | OUTPATIENT
Start: 2018-12-26 | End: 2019-04-10 | Stop reason: HOSPADM

## 2019-01-03 ENCOUNTER — CLINICAL SUPPORT (OUTPATIENT)
Dept: CARDIOLOGY | Facility: CLINIC | Age: 59
End: 2019-01-03
Payer: COMMERCIAL

## 2019-01-03 ENCOUNTER — OFFICE VISIT (OUTPATIENT)
Dept: CARDIOLOGY | Facility: CLINIC | Age: 59
End: 2019-01-03
Attending: INTERNAL MEDICINE
Payer: COMMERCIAL

## 2019-01-03 VITALS
SYSTOLIC BLOOD PRESSURE: 117 MMHG | BODY MASS INDEX: 21 KG/M2 | HEART RATE: 59 BPM | WEIGHT: 150 LBS | HEIGHT: 71 IN | DIASTOLIC BLOOD PRESSURE: 60 MMHG

## 2019-01-03 DIAGNOSIS — I50.42 CHRONIC COMBINED SYSTOLIC AND DIASTOLIC CONGESTIVE HEART FAILURE: ICD-10-CM

## 2019-01-03 DIAGNOSIS — I35.1 NONRHEUMATIC AORTIC VALVE INSUFFICIENCY: ICD-10-CM

## 2019-01-03 DIAGNOSIS — Q23.1 BICUSPID AORTIC VALVE: ICD-10-CM

## 2019-01-03 DIAGNOSIS — Z87.891 EX-SMOKER: ICD-10-CM

## 2019-01-03 DIAGNOSIS — I50.42 CHRONIC COMBINED SYSTOLIC AND DIASTOLIC CONGESTIVE HEART FAILURE: Primary | ICD-10-CM

## 2019-01-03 DIAGNOSIS — I35.1 NONRHEUMATIC AORTIC (VALVE) INSUFFICIENCY: Primary | ICD-10-CM

## 2019-01-03 PROCEDURE — 3008F PR BODY MASS INDEX (BMI) DOCUMENTED: ICD-10-PCS | Mod: CPTII,S$GLB,, | Performed by: INTERNAL MEDICINE

## 2019-01-03 PROCEDURE — 93306 TTE W/DOPPLER COMPLETE: CPT | Mod: 26,S$GLB,, | Performed by: INTERNAL MEDICINE

## 2019-01-03 PROCEDURE — 3074F PR MOST RECENT SYSTOLIC BLOOD PRESSURE < 130 MM HG: ICD-10-PCS | Mod: CPTII,S$GLB,, | Performed by: INTERNAL MEDICINE

## 2019-01-03 PROCEDURE — 3008F BODY MASS INDEX DOCD: CPT | Mod: CPTII,S$GLB,, | Performed by: INTERNAL MEDICINE

## 2019-01-03 PROCEDURE — 3074F SYST BP LT 130 MM HG: CPT | Mod: CPTII,S$GLB,, | Performed by: INTERNAL MEDICINE

## 2019-01-03 PROCEDURE — 93306 PR ECHO HEART XTHORACIC,COMPLETE W DOPPLER: ICD-10-PCS | Mod: 26,S$GLB,, | Performed by: INTERNAL MEDICINE

## 2019-01-03 PROCEDURE — 3078F PR MOST RECENT DIASTOLIC BLOOD PRESSURE < 80 MM HG: ICD-10-PCS | Mod: CPTII,S$GLB,, | Performed by: INTERNAL MEDICINE

## 2019-01-03 PROCEDURE — 99214 PR OFFICE/OUTPT VISIT, EST, LEVL IV, 30-39 MIN: ICD-10-PCS | Mod: 25,S$GLB,, | Performed by: INTERNAL MEDICINE

## 2019-01-03 PROCEDURE — 99214 OFFICE O/P EST MOD 30 MIN: CPT | Mod: 25,S$GLB,, | Performed by: INTERNAL MEDICINE

## 2019-01-03 PROCEDURE — 3078F DIAST BP <80 MM HG: CPT | Mod: CPTII,S$GLB,, | Performed by: INTERNAL MEDICINE

## 2019-01-03 NOTE — PROGRESS NOTES
"Subjective:    Patient ID:  Devin Lopez is a 58 y.o. male     HPI   Here for follow-up of chronic combined systolic and diastolic congestive heart failure, aortic insufficiency, bicuspid aortic valve, ex cigarette smoker.    I am doing better, I get around lot better without getting short of breath.    Current Outpatient Medications   Medication Sig    carvedilol (COREG) 6.25 MG tablet TAKE 1 TABLET(6.25 MG) BY MOUTH TWICE DAILY    furosemide (LASIX) 40 MG tablet TAKE 1 TABLET(40 MG) BY MOUTH EVERY DAY    losartan (COZAAR) 50 MG tablet TAKE 1 TABLET(50 MG) BY MOUTH EVERY DAY     No current facility-administered medications for this visit.          Review of Systems   Constitution: Negative for chills, decreased appetite, fever, weight gain and weight loss.   HENT: Negative for congestion, hearing loss and sore throat.    Eyes: Negative for blurred vision, double vision and visual disturbance.   Cardiovascular: Negative for chest pain, claudication, dyspnea on exertion, leg swelling, palpitations and syncope.   Respiratory: Negative for cough, hemoptysis, shortness of breath, sputum production and wheezing.    Endocrine: Negative for cold intolerance and heat intolerance.   Hematologic/Lymphatic: Negative for bleeding problem. Does not bruise/bleed easily.   Skin: Negative for color change, dry skin, flushing and itching.   Musculoskeletal: Negative for back pain, joint pain and myalgias.   Gastrointestinal: Negative for abdominal pain, anorexia, constipation, diarrhea, dysphagia, nausea and vomiting.        No bleeding per rectum   Genitourinary: Negative for dysuria, flank pain, frequency, hematuria and nocturia.   Neurological: Negative for dizziness, headaches, light-headedness, loss of balance, seizures and tremors.   Psychiatric/Behavioral: Negative for altered mental status and depression.         Vitals:    01/03/19 1149   BP: 117/60   Pulse: (!) 59   Weight: 68 kg (150 lb)   Height: 5' 11" (1.803 m) "     Objective:    Physical Exam   Constitutional: He is oriented to person, place, and time. He appears well-developed and well-nourished.   HENT:   Head: Normocephalic and atraumatic.   Right Ear: External ear normal.   Left Ear: External ear normal.   Nose: Nose normal.   Eyes: Conjunctivae and EOM are normal. Pupils are equal, round, and reactive to light. No scleral icterus.   Neck: Normal range of motion. Neck supple. No JVD present. No tracheal deviation present. No thyromegaly present.   Cardiovascular: Normal rate and regular rhythm. Exam reveals no gallop and no friction rub.   Murmur heard.  Basal early diastolic murmur   Pulmonary/Chest: Effort normal and breath sounds normal. No respiratory distress. He has no rales. He exhibits no tenderness.   Abdominal: Soft. Bowel sounds are normal. He exhibits no distension and no mass. There is no tenderness.   Musculoskeletal: Normal range of motion. He exhibits no edema or tenderness.   Lymphadenopathy:     He has no cervical adenopathy.   Neurological: He is alert and oriented to person, place, and time. He has normal reflexes. No cranial nerve deficit. Coordination normal.   Skin: Skin is warm and dry. No rash noted.   Psychiatric: He has a normal mood and affect. His behavior is normal.       echocardiogram reviewed.  Left ventricular internal cavitary diameter went from 6.0-6.5 since September 2018.    Assessment:       1. Chronic combined systolic and diastolic congestive heart failure    2. Nonrheumatic aortic valve insufficiency    3. Bicuspid aortic valve    4. Ex-smoker         Plan:       Discussed the significance off severe aortic insufficiency with increase in the left ventricular internal cavitary diameter.  I believe he should have and aortic valve replacement.  Plan to bring him in the hospital for a right and left heart catheterization, in anticipation of the need for open heart surgery.

## 2019-02-06 ENCOUNTER — HOSPITAL ENCOUNTER (OUTPATIENT)
Dept: PREADMISSION TESTING | Facility: OTHER | Age: 59
Discharge: HOME | End: 2019-02-06
Attending: INTERNAL MEDICINE
Payer: COMMERCIAL

## 2019-02-06 VITALS
WEIGHT: 155 LBS | BODY MASS INDEX: 21.7 KG/M2 | HEIGHT: 71 IN | HEART RATE: 70 BPM | SYSTOLIC BLOOD PRESSURE: 122 MMHG | DIASTOLIC BLOOD PRESSURE: 60 MMHG | OXYGEN SATURATION: 100 % | TEMPERATURE: 98 F

## 2019-02-06 DIAGNOSIS — I35.1 NONRHEUMATIC AORTIC VALVE INSUFFICIENCY: Primary | ICD-10-CM

## 2019-02-06 LAB
ANION GAP SERPL CALC-SCNC: 9 MMOL/L
BASOPHILS # BLD AUTO: 0.01 K/UL
BASOPHILS NFR BLD: 0.2 %
BUN SERPL-MCNC: 16 MG/DL
CALCIUM SERPL-MCNC: 9 MG/DL
CHLORIDE SERPL-SCNC: 104 MMOL/L
CO2 SERPL-SCNC: 26 MMOL/L
CREAT SERPL-MCNC: 1.1 MG/DL
DIFFERENTIAL METHOD: ABNORMAL
EOSINOPHIL # BLD AUTO: 0.1 K/UL
EOSINOPHIL NFR BLD: 1.3 %
ERYTHROCYTE [DISTWIDTH] IN BLOOD BY AUTOMATED COUNT: 13.7 %
EST. GFR  (AFRICAN AMERICAN): >60 ML/MIN/1.73 M^2
EST. GFR  (NON AFRICAN AMERICAN): >60 ML/MIN/1.73 M^2
GLUCOSE SERPL-MCNC: 99 MG/DL
HCT VFR BLD AUTO: 40.5 %
HGB BLD-MCNC: 13.8 G/DL
LYMPHOCYTES # BLD AUTO: 1.9 K/UL
LYMPHOCYTES NFR BLD: 41.9 %
MCH RBC QN AUTO: 33.3 PG
MCHC RBC AUTO-ENTMCNC: 34.1 G/DL
MCV RBC AUTO: 98 FL
MONOCYTES # BLD AUTO: 0.4 K/UL
MONOCYTES NFR BLD: 8.9 %
NEUTROPHILS # BLD AUTO: 2.2 K/UL
NEUTROPHILS NFR BLD: 47.7 %
PLATELET # BLD AUTO: 87 K/UL
PMV BLD AUTO: 11.3 FL
POTASSIUM SERPL-SCNC: 4.4 MMOL/L
RBC # BLD AUTO: 4.15 M/UL
SODIUM SERPL-SCNC: 139 MMOL/L
WBC # BLD AUTO: 4.61 K/UL

## 2019-02-06 PROCEDURE — 93005 ELECTROCARDIOGRAM TRACING: CPT

## 2019-02-06 PROCEDURE — 93010 ELECTROCARDIOGRAM REPORT: CPT | Mod: ,,, | Performed by: INTERNAL MEDICINE

## 2019-02-06 PROCEDURE — 36415 COLL VENOUS BLD VENIPUNCTURE: CPT

## 2019-02-06 PROCEDURE — 80048 BASIC METABOLIC PNL TOTAL CA: CPT

## 2019-02-06 PROCEDURE — 85025 COMPLETE CBC W/AUTO DIFF WBC: CPT

## 2019-02-06 PROCEDURE — 93010 EKG 12-LEAD: ICD-10-PCS | Mod: ,,, | Performed by: INTERNAL MEDICINE

## 2019-02-06 NOTE — DISCHARGE INSTRUCTIONS
PRE-ADMIT TESTING -  [phone]    [address]  [facility]          Your surgery has been scheduled at [facility]. We are pleased to have the opportunity to serve you. For Further Information please call [phone].    On the day of surgery please report to the Information Desk on the 1st floor.    · CONTACT YOUR PHYSICIAN'S OFFICE THE DAY PRIOR TO YOUR SURGERY TO OBTAIN YOUR ARRIVAL TIME.     · The evening before surgery do not eat anything after 9 p.m. ( this includes hard candy, chewing gum and mints).  You may only have GATORADE, POWERADE AND WATER  from 9 p.m. until you leave your home.   DO NOT DRINK ANY LIQUIDS ON THE WAY TO THE HOSPITAL.      SPECIAL MEDICATION INSTRUCTIONS: TAKE medications checked off by the Anesthesiologist on your Medication List.    Angiogram Patients: Take medications as instructed by your physician, including aspirin.     Surgery Patients:    If you take ASPIRIN - Your PHYSICIAN/SURGEON will need to inform you IF/OR when you need to stop taking aspirin prior to your surgery.     Do Not take any medications containing IBUPROFEN.  Do Not Wear any make-up or dark nail polish   (especially eye make-up) to surgery. If you come to surgery with makeup on you will be required to remove the makeup or nail polish.    Do not shave your surgical area at least 5 days prior to your surgery. The surgical prep will be performed at the hospital according to Infection Control regulations.    Leave all valuables at home.   Do Not wear any jewelry or watches, including any metal in body piercings. Jewelry must be removed prior to coming to the hospital.  There is a possibility that rings that are unable to be removed may be cut off if they are on the surgical extremity.    Contact Lens must be removed before surgery. Either do not wear the contact lens or bring a case and solution for storage.  Please bring a container for eyeglasses or dentures as required.  Bring  any paperwork your physician has provided, such as consent forms,  history and physicals, doctor's orders, etc.   Bring comfortable clothes that are loose fitting to wear upon discharge. Take into consideration the type of surgery being performed.  Maintain your diet as advised per your physician the day prior to surgery.      Adequate rest the night before surgery is advised.   Park in the Parking lot behind the hospital or in the [facility] across the street from the parking lot. Parking is complimentary.  If you will be discharged the same day as your procedure, please arrange for a responsible adult to drive you home or to accompany you if traveling by taxi.   YOU WILL NOT BE PERMITTED TO DRIVE OR TO LEAVE THE HOSPITAL ALONE AFTER SURGERY.   It is strongly recommended that you arrange for someone to remain with you for the first 24 hrs following your surgery.       Thank you for your cooperation.  The Staff of [facility].        Bathing Instructions                                                                 Please shower the evening before and morning of your procedure with    ANTIBACTERIAL SOAP. ( DIAL, etc )  Concentrate on the surgical area   for at least 3 minutes and rinse completely. Dry off as usual.   Do not use any deodorant, powder, body lotions, perfume, after shave or    cologne.

## 2019-02-08 ENCOUNTER — HOSPITAL ENCOUNTER (OUTPATIENT)
Facility: OTHER | Age: 59
Discharge: HOME | End: 2019-02-09
Attending: INTERNAL MEDICINE | Admitting: INTERNAL MEDICINE
Payer: COMMERCIAL

## 2019-02-08 DIAGNOSIS — I35.1 AORTIC VALVE REGURGITATION: ICD-10-CM

## 2019-02-08 DIAGNOSIS — I35.1 AORTIC VALVE INSUFFICIENCY, ETIOLOGY OF CARDIAC VALVE DISEASE UNSPECIFIED: ICD-10-CM

## 2019-02-08 DIAGNOSIS — I50.41 ACUTE COMBINED SYSTOLIC AND DIASTOLIC CONGESTIVE HEART FAILURE: Primary | ICD-10-CM

## 2019-02-08 LAB
CATH EF ESTIMATED: 35 %
OHS CV CATH LVEDP PRE: 22

## 2019-02-08 PROCEDURE — 99152 MOD SED SAME PHYS/QHP 5/>YRS: CPT | Performed by: INTERNAL MEDICINE

## 2019-02-08 PROCEDURE — 99153 MOD SED SAME PHYS/QHP EA: CPT | Performed by: INTERNAL MEDICINE

## 2019-02-08 PROCEDURE — 94761 N-INVAS EAR/PLS OXIMETRY MLT: CPT

## 2019-02-08 PROCEDURE — 63600175 PHARM REV CODE 636 W HCPCS: Performed by: INTERNAL MEDICINE

## 2019-02-08 PROCEDURE — 93460 R&L HRT ART/VENTRICLE ANGIO: CPT | Performed by: INTERNAL MEDICINE

## 2019-02-08 PROCEDURE — C1887 CATHETER, GUIDING: HCPCS | Performed by: INTERNAL MEDICINE

## 2019-02-08 PROCEDURE — C1760 CLOSURE DEV, VASC: HCPCS | Performed by: INTERNAL MEDICINE

## 2019-02-08 PROCEDURE — 25000003 PHARM REV CODE 250: Performed by: INTERNAL MEDICINE

## 2019-02-08 PROCEDURE — 25500020 PHARM REV CODE 255: Performed by: INTERNAL MEDICINE

## 2019-02-08 PROCEDURE — C1894 INTRO/SHEATH, NON-LASER: HCPCS | Performed by: INTERNAL MEDICINE

## 2019-02-08 PROCEDURE — C1751 CATH, INF, PER/CENT/MIDLINE: HCPCS | Performed by: INTERNAL MEDICINE

## 2019-02-08 PROCEDURE — C1769 GUIDE WIRE: HCPCS | Performed by: INTERNAL MEDICINE

## 2019-02-08 RX ORDER — DIPHENHYDRAMINE HCL 25 MG
25 CAPSULE ORAL
Status: COMPLETED | OUTPATIENT
Start: 2019-02-08 | End: 2019-02-08

## 2019-02-08 RX ORDER — FUROSEMIDE 40 MG/1
40 TABLET ORAL DAILY
Status: DISCONTINUED | OUTPATIENT
Start: 2019-02-09 | End: 2019-02-09 | Stop reason: HOSPADM

## 2019-02-08 RX ORDER — MAG HYDROX/ALUMINUM HYD/SIMETH 200-200-20
30 SUSPENSION, ORAL (FINAL DOSE FORM) ORAL
Status: DISCONTINUED | OUTPATIENT
Start: 2019-02-08 | End: 2019-02-08

## 2019-02-08 RX ORDER — HYDROCODONE BITARTRATE AND ACETAMINOPHEN 5; 325 MG/1; MG/1
1 TABLET ORAL EVERY 4 HOURS PRN
Status: DISCONTINUED | OUTPATIENT
Start: 2019-02-08 | End: 2019-02-09 | Stop reason: HOSPADM

## 2019-02-08 RX ORDER — SODIUM CHLORIDE 9 MG/ML
50 INJECTION, SOLUTION INTRAVENOUS CONTINUOUS
Status: ACTIVE | OUTPATIENT
Start: 2019-02-08 | End: 2019-02-08

## 2019-02-08 RX ORDER — ACETAMINOPHEN 325 MG/1
650 TABLET ORAL EVERY 4 HOURS PRN
Status: DISCONTINUED | OUTPATIENT
Start: 2019-02-08 | End: 2019-02-08

## 2019-02-08 RX ORDER — ONDANSETRON 2 MG/ML
4 INJECTION INTRAMUSCULAR; INTRAVENOUS EVERY 12 HOURS PRN
Status: DISCONTINUED | OUTPATIENT
Start: 2019-02-08 | End: 2019-02-08

## 2019-02-08 RX ORDER — HYDROCODONE BITARTRATE AND ACETAMINOPHEN 10; 325 MG/1; MG/1
1 TABLET ORAL EVERY 4 HOURS PRN
Status: DISCONTINUED | OUTPATIENT
Start: 2019-02-08 | End: 2019-02-09 | Stop reason: HOSPADM

## 2019-02-08 RX ORDER — LOSARTAN POTASSIUM 50 MG/1
50 TABLET ORAL DAILY
Status: DISCONTINUED | OUTPATIENT
Start: 2019-02-09 | End: 2019-02-09 | Stop reason: HOSPADM

## 2019-02-08 RX ORDER — LIDOCAINE HYDROCHLORIDE 10 MG/ML
INJECTION INFILTRATION; PERINEURAL
Status: DISCONTINUED | OUTPATIENT
Start: 2019-02-08 | End: 2019-02-08 | Stop reason: HOSPADM

## 2019-02-08 RX ORDER — MIDAZOLAM HYDROCHLORIDE 1 MG/ML
INJECTION, SOLUTION INTRAMUSCULAR; INTRAVENOUS
Status: DISCONTINUED | OUTPATIENT
Start: 2019-02-08 | End: 2019-02-08 | Stop reason: HOSPADM

## 2019-02-08 RX ORDER — NITROGLYCERIN 0.4 MG/1
0.4 TABLET SUBLINGUAL EVERY 5 MIN PRN
Status: DISCONTINUED | OUTPATIENT
Start: 2019-02-08 | End: 2019-02-09 | Stop reason: HOSPADM

## 2019-02-08 RX ORDER — FENTANYL CITRATE 50 UG/ML
INJECTION, SOLUTION INTRAMUSCULAR; INTRAVENOUS
Status: DISCONTINUED | OUTPATIENT
Start: 2019-02-08 | End: 2019-02-08 | Stop reason: HOSPADM

## 2019-02-08 RX ORDER — DIPHENHYDRAMINE HYDROCHLORIDE 50 MG/ML
25 INJECTION INTRAMUSCULAR; INTRAVENOUS EVERY 6 HOURS PRN
Status: DISCONTINUED | OUTPATIENT
Start: 2019-02-08 | End: 2019-02-08

## 2019-02-08 RX ORDER — ONDANSETRON 2 MG/ML
4 INJECTION INTRAMUSCULAR; INTRAVENOUS EVERY 12 HOURS PRN
Status: DISCONTINUED | OUTPATIENT
Start: 2019-02-08 | End: 2019-02-09 | Stop reason: HOSPADM

## 2019-02-08 RX ORDER — NAPROXEN SODIUM 220 MG/1
81 TABLET, FILM COATED ORAL
Status: DISCONTINUED | OUTPATIENT
Start: 2019-02-08 | End: 2019-02-08 | Stop reason: HOSPADM

## 2019-02-08 RX ORDER — NAPROXEN SODIUM 220 MG/1
162 TABLET, FILM COATED ORAL
Status: COMPLETED | OUTPATIENT
Start: 2019-02-08 | End: 2019-02-08

## 2019-02-08 RX ORDER — NITROGLYCERIN 0.4 MG/1
0.4 TABLET SUBLINGUAL EVERY 5 MIN PRN
Status: DISCONTINUED | OUTPATIENT
Start: 2019-02-08 | End: 2019-02-08

## 2019-02-08 RX ORDER — SODIUM CHLORIDE 9 MG/ML
INJECTION, SOLUTION INTRAVENOUS CONTINUOUS
Status: DISCONTINUED | OUTPATIENT
Start: 2019-02-08 | End: 2019-02-09 | Stop reason: HOSPADM

## 2019-02-08 RX ORDER — SODIUM CHLORIDE 9 MG/ML
50 INJECTION, SOLUTION INTRAVENOUS CONTINUOUS
Status: DISCONTINUED | OUTPATIENT
Start: 2019-02-08 | End: 2019-02-08

## 2019-02-08 RX ORDER — NITROGLYCERIN 0.4 MG/1
0.4 TABLET SUBLINGUAL EVERY 5 MIN PRN
Status: DISCONTINUED | OUTPATIENT
Start: 2019-02-08 | End: 2019-02-08 | Stop reason: HOSPADM

## 2019-02-08 RX ORDER — HYDROCODONE BITARTRATE AND ACETAMINOPHEN 10; 325 MG/1; MG/1
1 TABLET ORAL EVERY 4 HOURS PRN
Status: DISCONTINUED | OUTPATIENT
Start: 2019-02-08 | End: 2019-02-08

## 2019-02-08 RX ORDER — DIAZEPAM 5 MG/1
5 TABLET ORAL
Status: COMPLETED | OUTPATIENT
Start: 2019-02-08 | End: 2019-02-08

## 2019-02-08 RX ORDER — CARVEDILOL 6.25 MG/1
6.25 TABLET ORAL 2 TIMES DAILY
Status: DISCONTINUED | OUTPATIENT
Start: 2019-02-08 | End: 2019-02-09 | Stop reason: HOSPADM

## 2019-02-08 RX ORDER — ACETAMINOPHEN 325 MG/1
650 TABLET ORAL EVERY 4 HOURS PRN
Status: DISCONTINUED | OUTPATIENT
Start: 2019-02-08 | End: 2019-02-09 | Stop reason: HOSPADM

## 2019-02-08 RX ORDER — MAG HYDROX/ALUMINUM HYD/SIMETH 200-200-20
30 SUSPENSION, ORAL (FINAL DOSE FORM) ORAL
Status: DISCONTINUED | OUTPATIENT
Start: 2019-02-08 | End: 2019-02-09 | Stop reason: HOSPADM

## 2019-02-08 RX ORDER — DIPHENHYDRAMINE HYDROCHLORIDE 50 MG/ML
25 INJECTION INTRAMUSCULAR; INTRAVENOUS EVERY 6 HOURS PRN
Status: DISCONTINUED | OUTPATIENT
Start: 2019-02-08 | End: 2019-02-09 | Stop reason: HOSPADM

## 2019-02-08 RX ORDER — HYDROCODONE BITARTRATE AND ACETAMINOPHEN 5; 325 MG/1; MG/1
1 TABLET ORAL EVERY 4 HOURS PRN
Status: DISCONTINUED | OUTPATIENT
Start: 2019-02-08 | End: 2019-02-08

## 2019-02-08 RX ORDER — HEPARIN SOD,PORCINE/0.9 % NACL 1000/500ML
INTRAVENOUS SOLUTION INTRAVENOUS
Status: DISCONTINUED | OUTPATIENT
Start: 2019-02-08 | End: 2019-02-08 | Stop reason: HOSPADM

## 2019-02-08 RX ADMIN — DIPHENHYDRAMINE HYDROCHLORIDE 25 MG: 25 CAPSULE ORAL at 06:02

## 2019-02-08 RX ADMIN — CARVEDILOL 6.25 MG: 6.25 TABLET, FILM COATED ORAL at 08:02

## 2019-02-08 RX ADMIN — SODIUM CHLORIDE: 0.9 INJECTION, SOLUTION INTRAVENOUS at 06:02

## 2019-02-08 RX ADMIN — DIAZEPAM 5 MG: 5 TABLET ORAL at 06:02

## 2019-02-08 RX ADMIN — ASPIRIN 81 MG CHEWABLE TABLET 162 MG: 81 TABLET CHEWABLE at 06:02

## 2019-02-08 NOTE — OR NURSING
Pressure still being applied to site.  Dr Alarcon is coming to bedside.  Hematoma has been reduced.but not totally resolved.

## 2019-02-08 NOTE — INTERVAL H&P NOTE
The patient has been examined and the H&P has been reviewed:    I concur with the findings and no changes have occurred since H&P was written.    Anesthesia/Surgery risks, benefits and alternative options discussed and understood by patient/family.          Active Hospital Problems    Diagnosis  POA    Aortic valve regurgitation [I35.1]  Yes      Resolved Hospital Problems   No resolved problems to display.

## 2019-02-08 NOTE — Clinical Note
The PA catheter is repositioned to the right pulmonary artery. Hemodynamics performed. Cardiac output obtained. 2.74 L/Min

## 2019-02-08 NOTE — Clinical Note
Catheter is repositioned to the aorta. 40cc at 20cc/secThe vessel(s) were injected and visualized in multiple views.

## 2019-02-08 NOTE — Clinical Note
The left ventricle was injected. Multiple views of the injected vessel were taken. Rate = 10 mL/sec. Total volume = 25 mL.

## 2019-02-08 NOTE — Clinical Note
Closure device deployed in the right femoral artery. Device did not deploy correctly manual pressure was applied

## 2019-02-08 NOTE — OR NURSING
Patient was sitting with HOB at 45 degrees.  I checked his site and noticed bleeding and a hematoma.  I put pressure on the site immediately and called for help.  Dr Alarcon notified.

## 2019-02-08 NOTE — NURSING
Pt arrived to floor via stretcher with MIKY Ramirez and transferred to bed. Oriented to room, call light placed within reach, bed low and locked, and family at bedside.No complaints of pain. Pressure dressing to right groin with small amount of marked drainage. No acute distress noted at this time. Will continue to monitor.

## 2019-02-08 NOTE — Clinical Note
165 ml injected throughout the case. 85 mL total wasted during the case. 250 mL total used in the case.

## 2019-02-09 VITALS
DIASTOLIC BLOOD PRESSURE: 54 MMHG | HEART RATE: 70 BPM | HEIGHT: 71 IN | RESPIRATION RATE: 18 BRPM | SYSTOLIC BLOOD PRESSURE: 106 MMHG | WEIGHT: 155 LBS | OXYGEN SATURATION: 98 % | TEMPERATURE: 98 F | BODY MASS INDEX: 21.7 KG/M2

## 2019-02-09 PROCEDURE — 25000003 PHARM REV CODE 250: Performed by: INTERNAL MEDICINE

## 2019-02-09 RX ADMIN — FUROSEMIDE 40 MG: 40 TABLET ORAL at 10:02

## 2019-02-09 RX ADMIN — LOSARTAN POTASSIUM 50 MG: 50 TABLET, FILM COATED ORAL at 10:02

## 2019-02-09 RX ADMIN — CARVEDILOL 6.25 MG: 6.25 TABLET, FILM COATED ORAL at 10:02

## 2019-02-09 NOTE — PLAN OF CARE
Problem: Adult Inpatient Plan of Care  Goal: Plan of Care Review  Outcome: Ongoing (interventions implemented as appropriate)  Pt on RA. Sats 98%. No distress noted. Will continue to monitor.

## 2019-02-09 NOTE — PLAN OF CARE
Problem: Adult Inpatient Plan of Care  Goal: Plan of Care Review  Outcome: Ongoing (interventions implemented as appropriate)  Patient is s/p Left and Right Heart Catheterization POD#1. AAOx4, on RA, VSS, afebrile. No c/o pain or n/v. Sanguineous drainage on right groin dressing marked; dry and no changes. Patient remains on bed rest per MD's orders.

## 2019-02-09 NOTE — DISCHARGE SUMMARY
HISTORY OF PRESENT ILLNESS:  Mr. Lopez is a 58-year-old gentleman who was   admitted for right and left heart catheterization and aortic root injection and   left ventriculography.  He sought medical attention with the complaints of   increasing ankle swelling and progressively increasing shortness of breath in   February of 2018 and he was hospitalized with congestive heart failure.  He was   smoking half a pack of cigarettes a day and drinking more than a couple of shots   of alcohol daily.  He was noted to have a new onset of congestive heart   failure, with a global reduction in left ventricular systolic function, and   echocardiographic evidence of aortic insufficiency.  A heart catheterization   done at that time after treatment of congestive heart failure showed dilated   left ventricle, global reduction in left ventricular systolic function, moderate   degree of aortic insufficiency.  He was treated with carvedilol, losartan and   Lasix, was discharged from the hospital and has been seen progressively in the   office for followup.  Initially, there was improvement in the left ventricular   performance and the echocardiogram more recently has shown a moderately dilated   left ventricle and an ejection fraction between 45% and 50%, and a   moderate-to-severe degree of aortic insufficiency.  It was felt that he was best   suited now for an aortic valve replacement.  He was brought in for evaluation   with heart catheterization.  In the procedure, he was noted to have a dilated   left ventricle, an ejection fraction of about 40%.  He had global reduction in   left ventricular contractility, and normal coronary arteries, moderately severe   aortic insufficiency.  He was sent home on the same medications and will see our   cardiovascular surgical colleagues for consideration of an aortic valve   replacement.  He will continue on his regimen of carvedilol, losartan and Lasix.    I will see him back in the office  next week.  He was discharged in a stable   and satisfactory condition to his home.      SB/HN  dd: 02/08/2019 09:08:31 (CST)  td: 02/08/2019 23:35:18 (CST)  Doc ID   #[id number]  Job ID #[id number]    CC:

## 2019-02-09 NOTE — NURSING
12:17 PM  In agreement and eager for DC.  VU of DC instructions, paperwork and prescriptions passed. IV removed with cath tip intact, WNL.  To be DC home with family.  Will be escorted downstairs via  transport team once dressed and ready.   Free from falls or skin breakdown this hospital admission.   Darrell

## 2019-02-09 NOTE — DISCHARGE INSTRUCTIONS
Instructions Upon Discharge for Patient Following Cardiac Cath    1. Activity   Following a period of 6 Hrs of bedrest in the hospital, you may be discharged by your physician. It is not advisable to drive your self home.    After returning home, you should confine your activities to merely sitting up at mealtime or getting up to use the bathroom, until the following morning.     2. Dressing   When you are sent home from the hospital, you will have a firm dressing on your groin. This should remain dry and intact until the morning after the procedure. If you experience a sensation of warmth, wetness or have blood oozing from the area of the dressing, remove the dressing and apply firm pressure one inch above the entry site.  This pressure should be held continually for 10 to 15 minutes while you have someone call your physician.    Providing you have not experienced any of the complications listed above, the dressing may be removed on the morning after the procedure.  This can best be accomplished by saturating the dressing in the bathtub or shower.  After removing the dressing, a Band-aid can be place at the site.    3. Pain   If you should have any pain in your foot or leg, any swelling in the groin or any abdominal pain, you should contact your physician immediately.    4. Emergency   If you are unable to contact your physician, please report to the nearest Emergency Room.      Anesthesia: Monitored Anesthesia Care (MAC)    Anesthesia Safety  · Have an adult family member or friend drive you home after the procedure.  · For the first 24 hours after your surgery:  ¨ Do not drive or use heavy equipment.  ¨ Do not make important decisions or sign documents.  ¨ Avoid alcohol.  ¨ Have someone stay with you, if possible. They can watch for problems and help keep you safe.    PLEASE FOLLOW ANY OTHER INSTRUCTIONS PROVIDED TO YOU BY DR. RIVERA!

## 2019-02-26 ENCOUNTER — OFFICE VISIT (OUTPATIENT)
Dept: CARDIOLOGY | Facility: CLINIC | Age: 59
End: 2019-02-26
Attending: INTERNAL MEDICINE
Payer: COMMERCIAL

## 2019-02-26 VITALS
HEART RATE: 71 BPM | DIASTOLIC BLOOD PRESSURE: 58 MMHG | SYSTOLIC BLOOD PRESSURE: 130 MMHG | BODY MASS INDEX: 20.86 KG/M2 | HEIGHT: 71 IN | WEIGHT: 149 LBS

## 2019-02-26 DIAGNOSIS — I50.42 CHRONIC COMBINED SYSTOLIC AND DIASTOLIC CONGESTIVE HEART FAILURE: ICD-10-CM

## 2019-02-26 DIAGNOSIS — I35.1 NONRHEUMATIC AORTIC VALVE INSUFFICIENCY: Primary | ICD-10-CM

## 2019-02-26 DIAGNOSIS — Z87.891 EX-SMOKER: ICD-10-CM

## 2019-02-26 PROCEDURE — 3008F BODY MASS INDEX DOCD: CPT | Mod: CPTII,S$GLB,, | Performed by: INTERNAL MEDICINE

## 2019-02-26 PROCEDURE — 99214 PR OFFICE/OUTPT VISIT, EST, LEVL IV, 30-39 MIN: ICD-10-PCS | Mod: S$GLB,,, | Performed by: INTERNAL MEDICINE

## 2019-02-26 PROCEDURE — 3008F PR BODY MASS INDEX (BMI) DOCUMENTED: ICD-10-PCS | Mod: CPTII,S$GLB,, | Performed by: INTERNAL MEDICINE

## 2019-02-26 PROCEDURE — 3075F SYST BP GE 130 - 139MM HG: CPT | Mod: CPTII,S$GLB,, | Performed by: INTERNAL MEDICINE

## 2019-02-26 PROCEDURE — 3078F PR MOST RECENT DIASTOLIC BLOOD PRESSURE < 80 MM HG: ICD-10-PCS | Mod: CPTII,S$GLB,, | Performed by: INTERNAL MEDICINE

## 2019-02-26 PROCEDURE — 3078F DIAST BP <80 MM HG: CPT | Mod: CPTII,S$GLB,, | Performed by: INTERNAL MEDICINE

## 2019-02-26 PROCEDURE — 3075F PR MOST RECENT SYSTOLIC BLOOD PRESS GE 130-139MM HG: ICD-10-PCS | Mod: CPTII,S$GLB,, | Performed by: INTERNAL MEDICINE

## 2019-02-26 PROCEDURE — 99214 OFFICE O/P EST MOD 30 MIN: CPT | Mod: S$GLB,,, | Performed by: INTERNAL MEDICINE

## 2019-02-27 NOTE — H&P
HISTORY OF PRESENT ILLNESS:  Mr. Lopez is a 58-year-old gentleman who was   admitted for a right and left heart catheterization and an aortic root injection   and left ventriculography in anticipation of the need for aortic valve   replacement.  In February of 2018, he was hospitalized with congestive heart   failure.  At that time, he was drinking more than a couple of shots of alcohol a   day he said and smoking half a pack of cigarettes a day.  He presented with the   onset of breathlessness and congestive heart failure, had a severe global   reduction in left ventricular contractility and echocardiographic evidence of   aortic insufficiency.  Heart catheterization at that time after treatment of the   congestive heart failure showed a severely dilated left ventricle, global   reduction in left ventricular contractility and a mild-to-moderate degree of   aortic insufficiency.  He was treated with carvedilol, losartan, Lasix, was   discharged from the hospital, and since then he has been seen to be   progressively getting better.  There was initially an improvement of the left   ventricular performance, and the echocardiogram more recently has shown   moderately dilated left ventricle and an ejection fraction between 45% and 50%,   and a moderate to severe degree of aortic insufficiency.  It was felt that he   was not suited for an aortic valve replacement, was brought in for heart   catheterization.  For other details, refer to the notes of the recent   admissions.    PHYSICAL EXAMINATION:  GENERAL:  Reveals an alert, pleasant man, in no apparent acute distress.  VITAL SIGNS:  Blood pressure of 130/60 and a pulse of 65 beats per minute.  HEENT:  The sclerae are nonicteric.  The conjunctivae are pink.  The ENT exam is   unremarkable.  NECK:  Supple.  There is no jugular venous distention.  The carotid upstroke is   brisk.  There is no carotid bruit.  CHEST:  Clear.  The heart size is increased with the cardiac  apex half an inch   outside the midclavicular line.  S1 and S2 are normal.  There is a 2/6 early   diastolic murmur at the base.  There is no appreciable gallop.  ABDOMEN:  Soft and nontender.  EXTREMITIES:  There is no pedal edema.  NEUROLOGIC:  Grossly, the neurological exam is intact.    IMPRESSION ON ADMISSION:  1.  Moderate to severe aortic insufficiency.  2.  Congestive heart failure.  3.  Ex-cigarette smoker.  4.  Previous history of excess alcohol consumption.      SB/HN  dd: 02/26/2019 17:59:05 (CST)  td: 02/26/2019 22:16:51 (CST)  Doc ID   #[id number]  Job ID #[id number]    CC:

## 2019-02-27 NOTE — PROGRESS NOTES
"Subjective:    Patient ID:  Devin Lopez is a 58 y.o. male     HPI   Here for follow-up after heart catheterization for aortic insufficiency, and congestive heart failure.    I feel well, I have no complaints.  I have no exertional breathlessness of late.    Current Outpatient Medications   Medication Sig    carvedilol (COREG) 6.25 MG tablet TAKE 1 TABLET(6.25 MG) BY MOUTH TWICE DAILY    furosemide (LASIX) 40 MG tablet TAKE 1 TABLET(40 MG) BY MOUTH EVERY DAY    losartan (COZAAR) 50 MG tablet TAKE 1 TABLET(50 MG) BY MOUTH EVERY DAY     No current facility-administered medications for this visit.          Review of Systems   Constitution: Negative for chills, decreased appetite, fever, weight gain and weight loss.   HENT: Negative for congestion, hearing loss and sore throat.    Eyes: Negative for blurred vision, double vision and visual disturbance.   Cardiovascular: Negative for chest pain, claudication, dyspnea on exertion, leg swelling, palpitations and syncope.   Respiratory: Negative for cough, hemoptysis, shortness of breath, sputum production and wheezing.    Endocrine: Negative for cold intolerance and heat intolerance.   Hematologic/Lymphatic: Negative for bleeding problem. Does not bruise/bleed easily.   Skin: Negative for color change, dry skin, flushing and itching.   Musculoskeletal: Negative for back pain, joint pain and myalgias.   Gastrointestinal: Negative for abdominal pain, anorexia, constipation, diarrhea, dysphagia, nausea and vomiting.        No bleeding per rectum   Genitourinary: Negative for dysuria, flank pain, frequency, hematuria and nocturia.   Neurological: Negative for dizziness, headaches, light-headedness, loss of balance, seizures and tremors.   Psychiatric/Behavioral: Negative for altered mental status and depression.         Vitals:    02/26/19 1354   BP: (!) 130/58   Pulse: 71   Weight: 67.6 kg (149 lb)   Height: 5' 11" (1.803 m)     Objective:    Physical Exam "   Constitutional: He is oriented to person, place, and time. He appears well-developed and well-nourished.   HENT:   Head: Normocephalic and atraumatic.   Right Ear: External ear normal.   Left Ear: External ear normal.   Nose: Nose normal.   Eyes: Conjunctivae and EOM are normal. Pupils are equal, round, and reactive to light. No scleral icterus.   Neck: Normal range of motion. Neck supple. No JVD present. No tracheal deviation present. No thyromegaly present.   Cardiovascular: Normal rate and regular rhythm. Exam reveals no gallop and no friction rub.   Murmur heard.  Basal early diastolic murmur.   Pulmonary/Chest: Effort normal and breath sounds normal. No respiratory distress. He has no rales. He exhibits no tenderness.   Abdominal: Soft. Bowel sounds are normal. He exhibits no distension and no mass. There is no tenderness.   Musculoskeletal: Normal range of motion. He exhibits no edema or tenderness.   Lymphadenopathy:     He has no cervical adenopathy.   Neurological: He is alert and oriented to person, place, and time. He has normal reflexes. No cranial nerve deficit. Coordination normal.   Skin: Skin is warm and dry. No rash noted.   Psychiatric: He has a normal mood and affect. His behavior is normal.       reviewed the films with .    Assessment:       1. Nonrheumatic aortic valve insufficiency    2. Chronic combined systolic and diastolic congestive heart failure    3. Ex-smoker         Plan:       To see Dr. Blas Burks for consideration of aortic valve replacement.

## 2019-02-27 NOTE — PROGRESS NOTES
HISTORY OF PRESENT ILLNESS:  Mr. Lopez is a 58-year-old gentleman who was   admitted for a right and left heart catheterization and an aortic root injection   and left ventriculography in anticipation of the need for aortic valve   replacement.  In February of 2018, he was hospitalized with congestive heart   failure.  At that time, he was drinking more than a couple of shots of alcohol a   day he said and smoking half a pack of cigarettes a day.  He presented with the   onset of breathlessness and congestive heart failure, had a severe global   reduction in left ventricular contractility and echocardiographic evidence of   aortic insufficiency.  Heart catheterization at that time after treatment of the   congestive heart failure showed a severely dilated left ventricle, global   reduction in left ventricular contractility and a mild-to-moderate degree of   aortic insufficiency.  He was treated with carvedilol, losartan, Lasix, was   discharged from the hospital, and since then he has been seen to be   progressively getting better.  There was initially an improvement of the left   ventricular performance, and the echocardiogram more recently has shown   moderately dilated left ventricle and an ejection fraction between 45% and 50%,   and a moderate to severe degree of aortic insufficiency.  It was felt that he   was not suited for an aortic valve replacement, was brought in for heart   catheterization.  For other details, refer to the notes of the recent   admissions.    PHYSICAL EXAMINATION:  GENERAL:  Reveals an alert, pleasant man, in no apparent acute distress.  VITAL SIGNS:  Blood pressure of 130/60 and a pulse of 65 beats per minute.  HEENT:  The sclerae are nonicteric.  The conjunctivae are pink.  The ENT exam is   unremarkable.  NECK:  Supple.  There is no jugular venous distention.  The carotid upstroke is   brisk.  There is no carotid bruit.  CHEST:  Clear.  The heart size is increased with the cardiac  apex half an inch   outside the midclavicular line.  S1 and S2 are normal.  There is a 2/6 early   diastolic murmur at the base.  There is no appreciable gallop.  ABDOMEN:  Soft and nontender.  EXTREMITIES:  There is no pedal edema.  NEUROLOGIC:  Grossly, the neurological exam is intact.    IMPRESSION ON ADMISSION:  1.  Moderate to severe aortic insufficiency.  2.  Congestive heart failure.  3.  Ex-cigarette smoker.  4.  Previous history of excess alcohol consumption.      SB/HN  dd: 02/26/2019 17:59:05 (CST)  td: 02/26/2019 22:16:51 (CST)  Doc ID   #[id number]  Job ID #[id number]    CC:

## 2019-02-28 ENCOUNTER — TELEPHONE (OUTPATIENT)
Dept: CARDIOTHORACIC SURGERY | Facility: CLINIC | Age: 59
End: 2019-02-28

## 2019-02-28 NOTE — TELEPHONE ENCOUNTER
Returned call to pt.  Pt scheduled to see Dr. Burks on March 19.    ----- Message from Tasneem Solano sent at 2/28/2019  9:58 AM CST -----  Contact: Patient   Needs Advice    Reason for call: Patient was told to call Dr. Burks by his cardiologist to schedule a surgery (?)         Communication Preference: [phone]     Additional Information: please contact the patient

## 2019-03-19 ENCOUNTER — OFFICE VISIT (OUTPATIENT)
Dept: CARDIOTHORACIC SURGERY | Facility: CLINIC | Age: 59
End: 2019-03-19
Payer: COMMERCIAL

## 2019-03-19 VITALS
DIASTOLIC BLOOD PRESSURE: 64 MMHG | HEART RATE: 87 BPM | HEIGHT: 71 IN | BODY MASS INDEX: 20.87 KG/M2 | WEIGHT: 149.06 LBS | OXYGEN SATURATION: 100 % | SYSTOLIC BLOOD PRESSURE: 150 MMHG | TEMPERATURE: 98 F

## 2019-03-19 DIAGNOSIS — I50.42 CHRONIC COMBINED SYSTOLIC AND DIASTOLIC CONGESTIVE HEART FAILURE: ICD-10-CM

## 2019-03-19 DIAGNOSIS — I35.1 AORTIC VALVE INSUFFICIENCY, ETIOLOGY OF CARDIAC VALVE DISEASE UNSPECIFIED: Primary | ICD-10-CM

## 2019-03-19 DIAGNOSIS — I35.1 NONRHEUMATIC AORTIC VALVE INSUFFICIENCY: Primary | ICD-10-CM

## 2019-03-19 PROCEDURE — 99999 PR PBB SHADOW E&M-EST. PATIENT-LVL III: CPT | Mod: PBBFAC,,, | Performed by: THORACIC SURGERY (CARDIOTHORACIC VASCULAR SURGERY)

## 2019-03-19 PROCEDURE — 99205 OFFICE O/P NEW HI 60 MIN: CPT | Mod: S$GLB,,, | Performed by: THORACIC SURGERY (CARDIOTHORACIC VASCULAR SURGERY)

## 2019-03-19 PROCEDURE — 99205 PR OFFICE/OUTPT VISIT, NEW, LEVL V, 60-74 MIN: ICD-10-PCS | Mod: S$GLB,,, | Performed by: THORACIC SURGERY (CARDIOTHORACIC VASCULAR SURGERY)

## 2019-03-19 PROCEDURE — 3077F PR MOST RECENT SYSTOLIC BLOOD PRESSURE >= 140 MM HG: ICD-10-PCS | Mod: CPTII,S$GLB,, | Performed by: THORACIC SURGERY (CARDIOTHORACIC VASCULAR SURGERY)

## 2019-03-19 PROCEDURE — 3008F BODY MASS INDEX DOCD: CPT | Mod: CPTII,S$GLB,, | Performed by: THORACIC SURGERY (CARDIOTHORACIC VASCULAR SURGERY)

## 2019-03-19 PROCEDURE — 99999 PR PBB SHADOW E&M-EST. PATIENT-LVL III: ICD-10-PCS | Mod: PBBFAC,,, | Performed by: THORACIC SURGERY (CARDIOTHORACIC VASCULAR SURGERY)

## 2019-03-19 PROCEDURE — 3008F PR BODY MASS INDEX (BMI) DOCUMENTED: ICD-10-PCS | Mod: CPTII,S$GLB,, | Performed by: THORACIC SURGERY (CARDIOTHORACIC VASCULAR SURGERY)

## 2019-03-19 PROCEDURE — 3077F SYST BP >= 140 MM HG: CPT | Mod: CPTII,S$GLB,, | Performed by: THORACIC SURGERY (CARDIOTHORACIC VASCULAR SURGERY)

## 2019-03-19 PROCEDURE — 3078F PR MOST RECENT DIASTOLIC BLOOD PRESSURE < 80 MM HG: ICD-10-PCS | Mod: CPTII,S$GLB,, | Performed by: THORACIC SURGERY (CARDIOTHORACIC VASCULAR SURGERY)

## 2019-03-19 PROCEDURE — 3078F DIAST BP <80 MM HG: CPT | Mod: CPTII,S$GLB,, | Performed by: THORACIC SURGERY (CARDIOTHORACIC VASCULAR SURGERY)

## 2019-03-19 NOTE — H&P
"H&P      Subjective:      Patient ID:  Devin Lopez is a 58 y.o. male      HPI   Here for follow-up after heart catheterization for aortic insufficiency, and congestive heart failure.     I feel well, I have no complaints.  I have no exertional breathlessness of late.          Current Outpatient Medications   Medication Sig    carvedilol (COREG) 6.25 MG tablet TAKE 1 TABLET(6.25 MG) BY MOUTH TWICE DAILY    furosemide (LASIX) 40 MG tablet TAKE 1 TABLET(40 MG) BY MOUTH EVERY DAY    losartan (COZAAR) 50 MG tablet TAKE 1 TABLET(50 MG) BY MOUTH EVERY DAY      No current facility-administered medications for this visit.             Review of Systems   Constitution: Negative for chills, decreased appetite, fever, weight gain and weight loss.   HENT: Negative for congestion, hearing loss and sore throat.    Eyes: Negative for blurred vision, double vision and visual disturbance.   Cardiovascular: Negative for chest pain, claudication, dyspnea on exertion, leg swelling, palpitations and syncope.   Respiratory: Negative for cough, hemoptysis, shortness of breath, sputum production and wheezing.    Endocrine: Negative for cold intolerance and heat intolerance.   Hematologic/Lymphatic: Negative for bleeding problem. Does not bruise/bleed easily.   Skin: Negative for color change, dry skin, flushing and itching.   Musculoskeletal: Negative for back pain, joint pain and myalgias.   Gastrointestinal: Negative for abdominal pain, anorexia, constipation, diarrhea, dysphagia, nausea and vomiting.        No bleeding per rectum   Genitourinary: Negative for dysuria, flank pain, frequency, hematuria and nocturia.   Neurological: Negative for dizziness, headaches, light-headedness, loss of balance, seizures and tremors.   Psychiatric/Behavioral: Negative for altered mental status and depression.              Vitals:     02/26/19 1354   BP: (!) 130/58   Pulse: 71   Weight: 67.6 kg (149 lb)   Height: 5' 11" (1.803 m)      Objective: "    Physical Exam   Constitutional: He is oriented to person, place, and time. He appears well-developed and well-nourished.   HENT:   Head: Normocephalic and atraumatic.   Right Ear: External ear normal.   Left Ear: External ear normal.   Nose: Nose normal.   Eyes: Conjunctivae and EOM are normal. Pupils are equal, round, and reactive to light. No scleral icterus.   Neck: Normal range of motion. Neck supple. No JVD present. No tracheal deviation present. No thyromegaly present.   Cardiovascular: Normal rate and regular rhythm. Exam reveals no gallop and no friction rub.   Murmur heard.  Basal early diastolic murmur.   Pulmonary/Chest: Effort normal and breath sounds normal. No respiratory distress. He has no rales. He exhibits no tenderness.   Abdominal: Soft. Bowel sounds are normal. He exhibits no distension and no mass. There is no tenderness.   Musculoskeletal: Normal range of motion. He exhibits no edema or tenderness.   Lymphadenopathy:     He has no cervical adenopathy.   Neurological: He is alert and oriented to person, place, and time. He has normal reflexes. No cranial nerve deficit. Coordination normal.   Skin: Skin is warm and dry. No rash noted.   Psychiatric: He has a normal mood and affect. His behavior is normal.        reviewed the films with .     Assessment:       1. Nonrheumatic aortic valve insufficiency    2. Chronic combined systolic and diastolic congestive heart failure    3. Ex-smoker       Plan:       To see Dr. Blas Burks for consideration of aortic valve replacement.

## 2019-03-19 NOTE — LETTER
March 19, 2019      Jarred Alarcon MD  [address]  [facility] [address]           [facility] - Cardiovascular Surg  [address]  [facility] [address]  Phone: [phone]          Patient: Devin Lopez   MR Number: [id number]   YOB: 1960   Date of Visit: 3/19/2019       Dear Dr. Jarred Alarcon:    Thank you for referring Devin Lopez to me for evaluation. Attached you will find relevant portions of my assessment and plan of care.    If you have questions, please do not hesitate to call me. I look forward to following Devin Lopez along with you.    Sincerely,    Yuri Burks MD    Enclosure  CC:  No Recipients    If you would like to receive this communication electronically, please contact [email or web address] or [phone] to request more information on [employer] access.    For providers and/or their staff who would like to refer a patient to [facility], please contact us through our one-stop-shop provider referral line, [facility], at [phone].    If you feel you have received this communication in error or would no longer like to receive these types of communications, please e-mail [email or web address]

## 2019-03-19 NOTE — PROGRESS NOTES
Consult  Surgery      SUBJECTIVE:     Reason for Consult: Aortic Insufficiency     History of Present Illness: Devin Lopez is a 59 y.o. male with PMhx of HTN and AI who presents to CTS clinic for evaluation of aortic insufficiency. He has been managed by Dr. Alarcon on Coreg, Lasix, and losartan. There has been some improvement in LVEF over the past year on Cath and Echo.  In Feb he was was brought in for evaluation with heart catheterization.  In the procedure, he was noted to have a dilated left ventricle, an ejection fraction of about 40%.  He had global reduction in left ventricular contractility, normal coronary arteries, and moderately severe aortic insufficiency.    He is relatively asymptomatic wrt his AI. Had some BLLE swelling he noticed about a year ago but this has resolved on Lasix. Denies CP/SOB, palpitations, syncope, or history of MI/stroke. Reports some baseline fatigue requiring a daily nap. Weight has been stable.     PMHx: leg pain, HTN  SxHx: Anterior C spine fusion (approx 20yrs)  Previous smoker x 30yrs stopped 1 year ago  NKDA  No anticoagulants    Current Outpatient Medications on File Prior to Visit   Medication Sig    carvedilol (COREG) 6.25 MG tablet TAKE 1 TABLET(6.25 MG) BY MOUTH TWICE DAILY    furosemide (LASIX) 40 MG tablet TAKE 1 TABLET(40 MG) BY MOUTH EVERY DAY    losartan (COZAAR) 50 MG tablet TAKE 1 TABLET(50 MG) BY MOUTH EVERY DAY     No current facility-administered medications on file prior to visit.        Review of patient's allergies indicates:  No Known Allergies    Past Medical History:   Diagnosis Date    Hypertension      Past Surgical History:   Procedure Laterality Date    CATHETERIZATION, HEART, BOTH LEFT AND RIGHT Bilateral 2/8/2019    [facility]    CERVICAL SPINE SURGERY      HEART CATH-RIGHT & LEFT Right 2/27/2018    [facility] at [facility] CATH LAB    SPINE SURGERY       Family History    Problem Relation Age of Onset    Heart disease Mother     Cancer Father     Kidney disease Father     Hypertension Father      Social History     Tobacco Use    Smoking status: Former Smoker     Packs/day: 0.50     Years: 40.00     Pack years: 20.00     Types: Cigarettes    Smokeless tobacco: Never Used   Substance Use Topics    Alcohol use: Yes     Alcohol/week: 8.4 oz     Types: 14 Shots of liquor per week    Drug use: No        Review of Systems  Otherwise negative except as listed above    OBJECTIVE:     Vital Signs (Most Recent)  Temp: 98.2 °F (36.8 °C) (03/19/19 1034)  Pulse: 87 (03/19/19 1034)  BP: (!) 150/64 (03/19/19 1034)  SpO2: 100 % (03/19/19 1034)    Physical Exam  A&O, NAD, thin  RRR  No Respiratory Distress  No LE edema      Diagnostic Results:  Heart Cath 2/8/19  · There is mild to moderate left ventricular systolic dysfunction.  · LV end diastolic pressure is elevated.  · The ejection fraction is 30-40% by visual estimate.  · Dilated left ventricle  · Moderately severe aortic insufficiency.      TTE 1/3/2019       ASSESSMENT/PLAN:     A/P:  Devin Lopez is a 59 y.o. male with Aortic Insufficiency         Freddy Magallon MD   Pager: [phone]  General Surgery PGY-II  [facility] Attending Note:    I have personally taken the history and examined this patient and agree with the resident's note as stated above. Pleasant 59-year-old gentleman who has been followed for aortic insufficiency.  Unfortunately, he now has developed a dilated left ventricle as well as a decreased ejection fraction.  He does report significant lifestyle limiting fatigue.  His angiogram demonstrated normal coronary arteries, but a question of a dilated aortic root.  I recommended aortic valve replacement, with the possibility of aortic root replacement.  We discussed the risks and benefits of the proposed procedure, including but not limited to death, stroke, respiratory complications,  renal complications, arrythmia, need for permanent pacemaker, and infection.  He is considering his options after discussion of the advantages and disadvantages of tissue and mechanical prostheses.  His questions have been answered, and he wishes to proceed.  Will plan for a noncontrast CT of the chest at the time of his preoperative visit to evaluate his aortic root dilation.

## 2019-03-22 DIAGNOSIS — I35.1 AORTIC VALVE INSUFFICIENCY, ETIOLOGY OF CARDIAC VALVE DISEASE UNSPECIFIED: Primary | ICD-10-CM

## 2019-03-26 ENCOUNTER — TELEPHONE (OUTPATIENT)
Dept: PREADMISSION TESTING | Facility: HOSPITAL | Age: 59
End: 2019-03-26

## 2019-03-26 NOTE — TELEPHONE ENCOUNTER
----- Message from Snow العلي RN sent at 3/22/2019  4:27 PM CDT -----  Hello,  Could you please schedule this patient for 4-4? His surgery is on 4-5.  Looks like there's a space between 1 and 2pm, if possible.    Thanks,    Snow

## 2019-03-29 ENCOUNTER — ANESTHESIA EVENT (OUTPATIENT)
Dept: SURGERY | Facility: HOSPITAL | Age: 59
DRG: 220 | End: 2019-03-29
Payer: COMMERCIAL

## 2019-04-04 ENCOUNTER — HOSPITAL ENCOUNTER (OUTPATIENT)
Dept: PREADMISSION TESTING | Facility: HOSPITAL | Age: 59
Discharge: HOME | DRG: 220 | End: 2019-04-04
Attending: ANESTHESIOLOGY
Payer: COMMERCIAL

## 2019-04-04 ENCOUNTER — TELEPHONE (OUTPATIENT)
Dept: CARDIOTHORACIC SURGERY | Facility: CLINIC | Age: 59
End: 2019-04-04

## 2019-04-04 ENCOUNTER — DOCUMENTATION ONLY (OUTPATIENT)
Dept: CARDIOTHORACIC SURGERY | Facility: CLINIC | Age: 59
End: 2019-04-04

## 2019-04-04 ENCOUNTER — HOSPITAL ENCOUNTER (OUTPATIENT)
Dept: VASCULAR SURGERY | Facility: CLINIC | Age: 59
Discharge: HOME | End: 2019-04-04
Attending: THORACIC SURGERY (CARDIOTHORACIC VASCULAR SURGERY)
Payer: COMMERCIAL

## 2019-04-04 ENCOUNTER — HOSPITAL ENCOUNTER (OUTPATIENT)
Dept: PULMONOLOGY | Facility: CLINIC | Age: 59
Discharge: HOME | End: 2019-04-04
Attending: THORACIC SURGERY (CARDIOTHORACIC VASCULAR SURGERY)
Payer: COMMERCIAL

## 2019-04-04 ENCOUNTER — OFFICE VISIT (OUTPATIENT)
Dept: CARDIOTHORACIC SURGERY | Facility: CLINIC | Age: 59
End: 2019-04-04
Payer: COMMERCIAL

## 2019-04-04 ENCOUNTER — HOSPITAL ENCOUNTER (OUTPATIENT)
Dept: RADIOLOGY | Facility: HOSPITAL | Age: 59
Discharge: HOME | DRG: 220 | End: 2019-04-04
Attending: THORACIC SURGERY (CARDIOTHORACIC VASCULAR SURGERY)
Payer: COMMERCIAL

## 2019-04-04 VITALS
RESPIRATION RATE: 18 BRPM | SYSTOLIC BLOOD PRESSURE: 136 MMHG | DIASTOLIC BLOOD PRESSURE: 64 MMHG | HEART RATE: 79 BPM | BODY MASS INDEX: 20.86 KG/M2 | WEIGHT: 149 LBS | OXYGEN SATURATION: 99 % | TEMPERATURE: 98 F | HEIGHT: 71 IN

## 2019-04-04 VITALS
HEIGHT: 71 IN | TEMPERATURE: 98 F | WEIGHT: 149.56 LBS | BODY MASS INDEX: 20.94 KG/M2 | SYSTOLIC BLOOD PRESSURE: 139 MMHG | HEART RATE: 63 BPM | DIASTOLIC BLOOD PRESSURE: 65 MMHG | OXYGEN SATURATION: 100 %

## 2019-04-04 DIAGNOSIS — I35.1 AORTIC VALVE INSUFFICIENCY, ETIOLOGY OF CARDIAC VALVE DISEASE UNSPECIFIED: ICD-10-CM

## 2019-04-04 DIAGNOSIS — I35.1 NONRHEUMATIC AORTIC VALVE INSUFFICIENCY: Primary | ICD-10-CM

## 2019-04-04 DIAGNOSIS — I71.21 ASCENDING AORTIC ANEURYSM: ICD-10-CM

## 2019-04-04 DIAGNOSIS — Z01.818 PRE-OP EXAM: ICD-10-CM

## 2019-04-04 DIAGNOSIS — I65.23 BILATERAL CAROTID ARTERY STENOSIS: ICD-10-CM

## 2019-04-04 LAB
PRE FEV1 FVC: 75
PRE FEV1: 2.96
PRE FVC: 3.97
PREDICTED FEV1 FVC: 79
PREDICTED FEV1: 3.74
PREDICTED FVC: 4.64

## 2019-04-04 PROCEDURE — 93880 PR DUPLEX SCAN EXTRACRANIAL,BILAT: ICD-10-PCS | Mod: S$GLB,,, | Performed by: SURGERY

## 2019-04-04 PROCEDURE — 94010 BREATHING CAPACITY TEST: CPT | Mod: S$GLB,,, | Performed by: INTERNAL MEDICINE

## 2019-04-04 PROCEDURE — 71250 CT THORAX DX C-: CPT | Mod: 26,,, | Performed by: RADIOLOGY

## 2019-04-04 PROCEDURE — 99499 NO LOS: ICD-10-PCS | Mod: S$GLB,,, | Performed by: THORACIC SURGERY (CARDIOTHORACIC VASCULAR SURGERY)

## 2019-04-04 PROCEDURE — 93880 EXTRACRANIAL BILAT STUDY: CPT | Mod: S$GLB,,, | Performed by: SURGERY

## 2019-04-04 PROCEDURE — 71250 CT CHEST WITHOUT CONTRAST: ICD-10-PCS | Mod: 26,,, | Performed by: RADIOLOGY

## 2019-04-04 PROCEDURE — 99999 PR PBB SHADOW E&M-EST. PATIENT-LVL III: CPT | Mod: PBBFAC,,, | Performed by: THORACIC SURGERY (CARDIOTHORACIC VASCULAR SURGERY)

## 2019-04-04 PROCEDURE — 99999 PR PBB SHADOW E&M-EST. PATIENT-LVL III: ICD-10-PCS | Mod: PBBFAC,,, | Performed by: THORACIC SURGERY (CARDIOTHORACIC VASCULAR SURGERY)

## 2019-04-04 PROCEDURE — 94010 BREATHING CAPACITY TEST: ICD-10-PCS | Mod: S$GLB,,, | Performed by: INTERNAL MEDICINE

## 2019-04-04 PROCEDURE — 71250 CT THORAX DX C-: CPT | Mod: TC

## 2019-04-04 PROCEDURE — 99499 UNLISTED E&M SERVICE: CPT | Mod: S$GLB,,, | Performed by: THORACIC SURGERY (CARDIOTHORACIC VASCULAR SURGERY)

## 2019-04-04 NOTE — DISCHARGE INSTRUCTIONS
Anesthesia: General Anesthesia  Youre due to have surgery. During surgery, youll be given medication called anesthesia. (It is also called anesthetic.) This will keep you comfortable and pain-free. Your anesthesia provider will use general anesthesia. This sheet tells you more about it.  What is general anesthesia?     You are watched continuously during your procedure by the anesthesia provider   General anesthesia puts you into a state like deep sleep. It goes into the bloodstream (IV anesthetics), into the lungs (gas anesthetics), or both. You feel nothing during the procedure. You will not remember it. During the procedure, the anesthesia provider monitors you continuously. He or she checks your heart rate and rhythm, blood pressure, breathing, and blood oxygen.  · IV Anesthetics. IV anesthetics are given through an IV line in your arm. Theyre often given first. This is so you are asleep before a gas anesthetic is started. Some kinds of IV anesthetics relieve pain. Others relax you. Your doctor will decide which kind is best in your case.  · Gas Anesthetics. Gas anesthetics are breathed into the lungs. They are often used to keep you asleep. They can be given through a facemask or a tube placed in your larynx or trachea (breathing tube).  ? If you have a facemask, your anesthesia provider will most likely place it over your nose and mouth while youre still awake. Youll breathe oxygen through the mask as your IV anesthetic is started. Gas anesthetic may be added through the mask.  ? If you have a tube in the larynx or trachea, it will be inserted into your throat after youre asleep.  Anesthesia tools and medications  You will likely have:  · IV anesthetics. These are put into an IV line into your bloodstream.  · Gas anesthetics. You breathe these anesthetics into your lungs, where they pass into your bloodstream.  · Pulse oximeter. This is a small clip that is attached to the end of your finger. This  measures your blood oxygen level.  · Electrocardiography leads (electrodes). These are small sticky pads that are placed on your chest. They record your heart rate and rhythm.  · Blood pressure cuff. This reads your blood pressure.  Risks and possible complications  General anesthesia has some risks. These include:  · Breathing problems  · Nausea and vomiting  · Sore throat or hoarseness (usually temporary)  · Allergic reaction to the anesthetic  · Irregular heartbeat (rare)  · Cardiac arrest (rare)   Anesthesia safety  · Follow all instructions you are given for how long not to eat or drink before your procedure.  · Be sure your doctor knows what medications and drugs you take. This includes over-the-counter medications, herbs, supplements, alcohol or other drugs. You will be asked when those were last taken.  · Have an adult family member or friend drive you home after the procedure.  · For the first 24 hours after your surgery:  ? Do not drive or use heavy equipment.  ? Have a trusted family member or spouse make important decisions or sign documents.  ? Avoid alcohol.  ? Have a responsible adult stay with you. He or she can watch for problems and help keep you safe.  Date Last Reviewed: 10/16/2014  © 1240-7352 [employer]. [address] [place] [address], [place] [address]. All rights reserved. This information is not intended as a substitute for professional medical care. Always follow your healthcare professional's instructions.

## 2019-04-04 NOTE — H&P (VIEW-ONLY)
Consult  Surgery      SUBJECTIVE:     Reason for Consult: Aortic Insufficiency     History of Present Illness: Devin Lopez is a 59 y.o. male with PMhx of HTN and AI who presents to CTS clinic for evaluation of aortic insufficiency. He has been managed by Dr. Alarcon on Coreg, Lasix, and losartan. There has been some improvement in LVEF over the past year on Cath and Echo.  In Feb he was was brought in for evaluation with heart catheterization.  In the procedure, he was noted to have a dilated left ventricle, an ejection fraction of about 40%.  He had global reduction in left ventricular contractility, normal coronary arteries, and moderately severe aortic insufficiency.    He is relatively asymptomatic wrt his AI. Had some BLLE swelling he noticed about a year ago but this has resolved on Lasix. Denies CP/SOB, palpitations, syncope, or history of MI/stroke. Reports some baseline fatigue requiring a daily nap. Weight has been stable.     PMHx: leg pain, HTN  SxHx: Anterior C spine fusion (approx 20yrs)  Previous smoker x 30yrs stopped 1 year ago  NKDA  No anticoagulants    Current Outpatient Medications on File Prior to Visit   Medication Sig    carvedilol (COREG) 6.25 MG tablet TAKE 1 TABLET(6.25 MG) BY MOUTH TWICE DAILY    furosemide (LASIX) 40 MG tablet TAKE 1 TABLET(40 MG) BY MOUTH EVERY DAY    losartan (COZAAR) 50 MG tablet TAKE 1 TABLET(50 MG) BY MOUTH EVERY DAY     No current facility-administered medications on file prior to visit.        Review of patient's allergies indicates:  No Known Allergies    Past Medical History:   Diagnosis Date    Hypertension      Past Surgical History:   Procedure Laterality Date    CATHETERIZATION, HEART, BOTH LEFT AND RIGHT Bilateral 2/8/2019    [facility] CATH LAB    CERVICAL SPINE SURGERY      HEART CATH-RIGHT & LEFT Right 2/27/2018    [facility] at [facility] CATH LAB    SPINE SURGERY       Family History    Problem Relation Age of Onset    Heart disease Mother     Cancer Father     Kidney disease Father     Hypertension Father      Social History     Tobacco Use    Smoking status: Former Smoker     Packs/day: 0.50     Years: 40.00     Pack years: 20.00     Types: Cigarettes    Smokeless tobacco: Never Used   Substance Use Topics    Alcohol use: Yes     Alcohol/week: 8.4 oz     Types: 14 Shots of liquor per week    Drug use: No        Review of Systems  Otherwise negative except as listed above    OBJECTIVE:     Vital Signs (Most Recent)  Temp: 98.2 °F (36.8 °C) (04/04/19 1421)  Pulse: 63 (04/04/19 1421)  BP: 139/65 (04/04/19 1421)  SpO2: 100 % (04/04/19 1421)    Physical Exam  A&O, NAD, thin  RRR  No Respiratory Distress  No LE edema      Diagnostic Results:  Heart Cath 2/8/19  · There is mild to moderate left ventricular systolic dysfunction.  · LV end diastolic pressure is elevated.  · The ejection fraction is 30-40% by visual estimate.  · Dilated left ventricle  · Moderately severe aortic insufficiency.      TTE 1/3/2019       ASSESSMENT/PLAN:     A/P:  Devin Lopez is a 59 y.o. male with Aortic Insufficiency         Freddy Magallon MD   Pager: [phone]  General Surgery PGY-II  [facility] Attending Note:    I have personally taken the history and examined this patient and agree with the resident's note as stated above. Pleasant 59-year-old gentleman with moderate to severe aortic insufficiency.  He has had some left ventricular dilation.  We are planning aortic valve replacement.  As part of his preoperative preparation, he had a CT scan which I reviewed.  The maximal diameter of his ascending aorta is 4.3 cm by my measurement.  Given his young age, and lack of coronary disease, I am concerned that if we replace the valve only then he will be confronted with yearly follow-up in anticipation of progression of his ascending aortic aneurysm.  I recommended aortic root  replacement, and he agreed with this.  We reviewed the choices of valves, and he desires a tissue valve.  His questions have been answered, and he wishes to proceed.

## 2019-04-04 NOTE — PRE-PROCEDURE INSTRUCTIONS
Pt was seen in the pre-op center today. Pre-op instructions given including NPO after MN, medications to take/hold the morning of surgery per Dr Burks's clinic, arrival procedure and location, shower with antibacterial soap; anesthesia type discussed and pre-op assessment completed. Pts questions answered and concerns addressed. Pt verbalized understanding.

## 2019-04-04 NOTE — ANESTHESIA PREPROCEDURE EVALUATION
04/04/2019  Pre-operative evaluation for Procedure(s) (LRB):  BENTALL PROCEDURE (N/A)    Devin Lopez is a 59 y.o. male with PMH of HTN, AI, and ascending aortic aneurysm (max diameter of ascending aorta is 4.3 cm) who is scheduled for Bentall Procedure. [facility] notes dilated left ventricle, EF 40%, global reduction in LV contractility, normal coronary artiers, and moderate-severe AI. Patient has a Hx of Anterior C Spine fusion about 20 years ago and he is a former smoker (quit a year ago).     Prev airway: None on file       Patient Active Problem List   Diagnosis    Acute combined systolic and diastolic congestive heart failure    Acute respiratory failure with hypoxia    Thrombocytopenia    Tobacco abuse    Troponin level elevated    Current drinker of alcohol    Nonrheumatic aortic valve insufficiency    Bicuspid aortic valve    Ex-smoker    Chronic combined systolic and diastolic congestive heart failure    Aortic valve regurgitation    Pre-op exam    Bilateral carotid artery stenosis    Ascending aortic aneurysm       Review of patient's allergies indicates:  No Known Allergies     No current facility-administered medications on file prior to encounter.      Current Outpatient Medications on File Prior to Encounter   Medication Sig Dispense Refill    carvedilol (COREG) 6.25 MG tablet TAKE 1 TABLET(6.25 MG) BY MOUTH TWICE DAILY 60 tablet 5    furosemide (LASIX) 40 MG tablet TAKE 1 TABLET(40 MG) BY MOUTH EVERY DAY 30 tablet 5    losartan (COZAAR) 50 MG tablet TAKE 1 TABLET(50 MG) BY MOUTH EVERY DAY 30 tablet 5       Past Surgical History:   Procedure Laterality Date    CATHETERIZATION, HEART, BOTH LEFT AND RIGHT Bilateral 2/8/2019    [facility]con [facility] C[facility] LAB    CERVICAL SPINE SURGERY      HEART CATH-RIGHT & LEFT Right 2/27/2018    [facility] Jarred  [facility] Ag[facility]ATH [facility]    SPINE SURGERY         Social History     Socioeconomic History    Marital status:      Spouse name: Not on file    Number of children: Not on file    Years of education: Not on file    Highest education level: Not on file   Occupational History    Not on file   Social Needs    Financial resource strain: Not on file    Food insecurity:     Worry: Not on file     Inability: Not on file    Transportation needs:     Medical: Not on file     Non-medical: Not on file   Tobacco Use    Smoking status: Former Smoker     Packs/day: 0.50     Years: 40.00     Pack years: 20.00     Types: Cigarettes    Smokeless tobacco: Never Used   Substance and Sexual Activity    Alcohol use: Yes     Alcohol/week: 8.4 oz     Types: 14 Shots of liquor per week    Drug use: No    Sexual activity: Yes     Partners: Female     Comment: wife   Lifestyle    Physical activity:     Days per week: Not on file     Minutes per session: Not on file    Stress: Not on file   Relationships    Social connections:     Talks on phone: Not on file     Gets together: Not on file     Attends Yarsani service: Not on file     Active member of club or organization: Not on file     Attends meetings of clubs or organizations: Not on file     Relationship status: Not on file   Other Topics Concern    Not on file   Social History Narrative    Not on file         Vital Signs Range (Last 24H):  Temp:  [36.8 °C (98.2 °F)]   Pulse:  [63-79]   Resp:  [18]   BP: (136-139)/(64-65)   SpO2:  [99 %-100 %]       CBC:   Recent Labs     04/04/19  1152   WBC 7.76   RBC 4.51*   HGB 15.0   HCT 45.4   PLT 90*   *   MCH 33.3*   MCHC 33.0       CMP:   Recent Labs     04/04/19  1152      K 4.4      CO2 31*   BUN 13   CREATININE 1.2   GLU 70   CALCIUM 9.8   ALBUMIN 4.2   PROT 7.7   ALKPHOS 72   ALT 23   AST 25   BILITOT 1.7*       INR  Recent Labs     04/04/19  1152   INR 1.0   APTT 25.5       EKG:  Sinus rhythm  with Premature atrial complexes  Possible Left atrial enlargement  LVH.  Incomplete right bundle branch block    Confirmed by Agnes CABELLO, Jarred ([id number]) on 2/7/2019 12:57:59 PM    2D Echo:  CONCLUSIONS     1 - Mild left ventricular enlargement.     2 - Eccentric hypertrophy.     3 - No wall motion abnormalities.     4 - Moderately depressed left ventricular systolic function (EF 35-40%).     5 - Restrictive LV filling pattern, indicating markedly elevated LAP (grade 3 diastolic dysfunction).     6 - Right atrial enlargement.     7 - Pulmonary hypertension. The estimated PA systolic pressure is greater than 47 mmHg.     8 - Moderate aortic regurgitation.     9 - Mild mitral regurgitation.     10 - Mild to moderate tricuspid regurgitation.     11 - Trivial pericardial effusion.       This document has been electronically    SIGNED BY: Jarred Alarcon MD On: 02/26/2018 17:38    [facility] 2/27/18:    Patient has a right dominant coronary artery.        - Left Main Coronary Artery:             The LM is normal. There is DANA 3 flow.     - Left Anterior Descending Artery:             The LAD is normal. There is DANA 3 flow.     - Left Circumflex Artery:             The LCX is normal. There is DANA 3 flow.     - Right Coronary Artery:             The RCA is normal. There is DANA 3 flow. Non dominant vessel     - Aortic Root:             The Aortic Root is normal. There is DANA 3 flow.     - Common Femoral Artery:             The right CFA is normal.        Anesthesia Evaluation    I have reviewed the Patient Summary Reports.    I have reviewed the Nursing Notes.   I have reviewed the Medications.     Review of Systems  Anesthesia Hx:  No problems with previous Anesthesia  History of prior surgery of interest to airway management or planning: Previous anesthesia: General ACDF 20 yrs with general anesthesia.  Denies Family Hx of Anesthesia complications.    Social:  Former Smoker Stopped 1/2018; 1 ppd x 20 yrs; 1 glass  wine daily   Hematology/Oncology:  Hematology Normal   Oncology Normal   Hematology Comments: Thrombocytopenia: Platelet ct 90 4/4/19    EENT/Dental:  EENT/Dental Normal Reading glasses   Cardiovascular:   Hypertension Valvular problems/Murmurs, AI CHF Enlarged aortic root Functional Capacity good / => 4 METS, walks dog 4 blocks daily, climb 1 FOS; denies CP, SOB  Valvular Heart Disease: (per ECHO 1/3/19) Aortic Regurgitation (AR), moderate, severe, Mitral Regurgitation (MR), mild     Pulmonary:  Pulmonary Normal Shortness of breath: reports when bending forward.    Renal/:  Renal/ Normal     Hepatic/GI:  Hepatic/GI Normal  Denies GERD.    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    Dermatological:  Skin Normal    Psych:  Psychiatric Normal           Physical Exam  General:  Well nourished    Airway/Jaw/Neck:  Airway Findings: Mouth Opening: Normal Tongue: Normal  General Airway Assessment: Adult  Mallampati: II  TM Distance: Normal, at least 6 cm  Jaw/Neck Findings:     Neck ROM: Extension Decreased, Mild      Dental:  Dental Findings: Periodontal disease, Mild    Chest/Lungs:  Chest/Lungs Findings: Clear to auscultation, Normal Respiratory Rate     Heart/Vascular:  Heart Findings: Rate: Normal  Rhythm: Regular Rhythm  Heart Murmur  Diastolic  Diastolic Heart Murmur Description: R Upper Sternal Border, L Upper Sternal Border  Diastolic Heart Murmur Grade III        Mental Status:  Mental Status Findings:  Cooperative, Alert and Oriented       Pt was seen in POC 4/4/19/Wanda Lentz RN        Anesthesia Plan  Type of Anesthesia, risks & benefits discussed:  Anesthesia Type:  general  Patient's Preference: General  Intra-op Monitoring Plan: standard ASA monitors, Appleton-Marcia, central line, cardiac output and arterial line  Intra-op Monitoring  Plan Comments:   Post Op Pain Control Plan: multimodal analgesia, peripheral nerve block and IV/PO Opioids PRN  Post Op Pain Control Plan Comments: IV pain meds per primary service   Induction:   IV  Beta Blocker:         Informed Consent: Patient understands risks and agrees with Anesthesia plan.  Questions answered. Anesthesia consent signed with patient.  ASA Score: 4     Day of Surgery Review of History & Physical:    H&P update referred to the surgeon.     Anesthesia Plan Notes: Discussed plan for awake arterial line, central line, intraoperative RAJWINDER, and post operative ICU Care        Ready For Surgery From Anesthesia Perspective.

## 2019-04-04 NOTE — PROGRESS NOTES
Consult  Surgery      SUBJECTIVE:     Reason for Consult: Aortic Insufficiency     History of Present Illness: Devin Lopez is a 59 y.o. male with PMhx of HTN and AI who presents to CTS clinic for evaluation of aortic insufficiency. He has been managed by Dr. Alarcon on Coreg, Lasix, and losartan. There has been some improvement in LVEF over the past year on Cath and Echo.  In Feb he was was brought in for evaluation with heart catheterization.  In the procedure, he was noted to have a dilated left ventricle, an ejection fraction of about 40%.  He had global reduction in left ventricular contractility, normal coronary arteries, and moderately severe aortic insufficiency.    He is relatively asymptomatic wrt his AI. Had some BLLE swelling he noticed about a year ago but this has resolved on Lasix. Denies CP/SOB, palpitations, syncope, or history of MI/stroke. Reports some baseline fatigue requiring a daily nap. Weight has been stable.     PMHx: leg pain, HTN  SxHx: Anterior C spine fusion (approx 20yrs)  Previous smoker x 30yrs stopped 1 year ago  NKDA  No anticoagulants    Current Outpatient Medications on File Prior to Visit   Medication Sig    carvedilol (COREG) 6.25 MG tablet TAKE 1 TABLET(6.25 MG) BY MOUTH TWICE DAILY    furosemide (LASIX) 40 MG tablet TAKE 1 TABLET(40 MG) BY MOUTH EVERY DAY    losartan (COZAAR) 50 MG tablet TAKE 1 TABLET(50 MG) BY MOUTH EVERY DAY     No current facility-administered medications on file prior to visit.        Review of patient's allergies indicates:  No Known Allergies    Past Medical History:   Diagnosis Date    Hypertension      Past Surgical History:   Procedure Laterality Date    CATHETERIZATION, HEART, BOTH LEFT AND RIGHT Bilateral 2/8/2019    Performed [facility] CATH LAB    CERVICAL SPINE SURGERY      HEART CATH-RIGHT & LEFT Right 2/27/2018    Performed by [facility]con [facility] at [facility] CATH LAB    SPINE SURGERY       Family History    Problem Relation Age of Onset    Heart disease Mother     Cancer Father     Kidney disease Father     Hypertension Father      Social History     Tobacco Use    Smoking status: Former Smoker     Packs/day: 0.50     Years: 40.00     Pack years: 20.00     Types: Cigarettes    Smokeless tobacco: Never Used   Substance Use Topics    Alcohol use: Yes     Alcohol/week: 8.4 oz     Types: 14 Shots of liquor per week    Drug use: No        Review of Systems  Otherwise negative except as listed above    OBJECTIVE:     Vital Signs (Most Recent)  Temp: 98.2 °F (36.8 °C) (04/04/19 1421)  Pulse: 63 (04/04/19 1421)  BP: 139/65 (04/04/19 1421)  SpO2: 100 % (04/04/19 1421)    Physical Exam  A&O, NAD, thin  RRR  No Respiratory Distress  No LE edema      Diagnostic Results:  Heart Cath 2/8/19  · There is mild to moderate left ventricular systolic dysfunction.  · LV end diastolic pressure is elevated.  · The ejection fraction is 30-40% by visual estimate.  · Dilated left ventricle  · Moderately severe aortic insufficiency.      TTE 1/3/2019       ASSESSMENT/PLAN:     A/P:  Devin Lopez is a 59 y.o. male with Aortic Insufficiency         Freddy Magallon MD   Pager: [phone]  General Surgery PGY-II  [facility] Attending Note:    I have personally taken the history and examined this patient and agree with the resident's note as stated above. Pleasant 59-year-old gentleman with moderate to severe aortic insufficiency.  He has had some left ventricular dilation.  We are planning aortic valve replacement.  As part of his preoperative preparation, he had a CT scan which I reviewed.  The maximal diameter of his ascending aorta is 4.3 cm by my measurement.  Given his young age, and lack of coronary disease, I am concerned that if we replace the valve only then he will be confronted with yearly follow-up in anticipation of progression of his ascending aortic aneurysm.  I recommended aortic root  replacement, and he agreed with this.  We reviewed the choices of valves, and he desires a tissue valve.  His questions have been answered, and he wishes to proceed.

## 2019-04-04 NOTE — PROGRESS NOTES
"PREPARING FOR SURGERY  Your surgery has been scheduled for:   Day: _Friday_______ Date:  _April 5th__________  Arrival Time: 5A  Start Time: 7A  You should report to the second floor surgery center, located on the [facility] side of the second floor of the [facility]. The phone number is [phone].     PLEASE NOTE  · If you are allergic to any medications, please inform your doctor or the nurse responsible for your care.  · Tell the doctor if you take aspirin, products containing aspirin, herbal medications or blood thinners, such as Coumadin, Pradaxa, or Plavix.  · Notify your doctor if you are diabetic and provide information about the medications you take.  · Arrange for someone to drive you home following surgery. You will not be allowed to leave the surgical facility alone or drive yourself home following sedation and anesthesia.  · If you have not already done so, please bring a list of your medications with you the day of your surgery.     BEFORE SURGERY  Stop taking all herbal medications 14 days prior to surgery  Stop taking aspirin, products containing aspirin 0 days before surgery  Stop taking blood thinners 5 days before surgery  Refrain from drinking alcohol beverages for 24 hours before and after surgery  Stop or limit smoking 0 days before surgery  Other: ________________________________________________________     THE NIGHT BEFORE SURGERY  Eat a light supper on the night before your surgery, no greasy or fatty foods.  DO NOT EAT OR DRINK ANYTHING AFTER MIDNIGHT, INCLUDING GUM, HARD CANDY, MINTS, OR CHEWING TOBACCO  Take a complete shower. Wash your body from the neck down with Hibiclens (chlorhexidine gluconate) soap. Hibiclens soap may be purchased over the counter at the pharmacy. Keep the soap away from your eyes, ears, and mouth. After washing with Hibiclens, rinse thoroughly. You may also use any soap labeled "antibacterial". Shampoo your hair with your regular shampoo.     THE " DAY OF SURGERY  Take another shower with Hibiclens or any antibacterial soap, to reduce the change of infection.  Medications to take the morning of surgery:  _Carvedilol_____________ with a small sip of water. Do not take diuretics or fluid pills.  Diabetic medication instructions will be given by the preop center. They will call you before your surgery.  You may brush your teeth and rinse your mouth, but do not shallow any water.  Do not apply perfume, powder, body lotions or deodorant on the day of surgery.  Do not wear any makeup, including mascara and false eyelashes.  Nail polish should be removed.  Wear comfortable clothes, such as button front shirt and loose-fitting pants.  Leave all jewelry, including body piercings and valuables at home.  Hairpins and clasps must be removed before you enter the operating room.  You may wear glasses, dentures and hearing aids before and after surgery. They may need to be removed before going into the operating room. Contact lenses worn before surgery must be removed before entering the operating room. Please bring a case for your hearing aids, glasses and/or contacts.  Bring any devices you will need after surgery such as crutches or canes.  If you have sleep apnea, please bring your CPAP machine.  If you have an implantable device, such as a pacemaker or AICD, please bring the device information card, if you have one.     If you have any questions or concerns, please don't hesitate to call.     Snow العلي RN  RN Clinician  Thoracic and Cardiovascular Surgery  [address] [place] [address] [id number] [phone] fax

## 2019-04-05 ENCOUNTER — HOSPITAL ENCOUNTER (INPATIENT)
Facility: HOSPITAL | Age: 59
LOS: 5 days | Discharge: HOME HEALTH | DRG: 220 | End: 2019-04-10
Attending: THORACIC SURGERY (CARDIOTHORACIC VASCULAR SURGERY) | Admitting: THORACIC SURGERY (CARDIOTHORACIC VASCULAR SURGERY)
Payer: COMMERCIAL

## 2019-04-05 ENCOUNTER — ANESTHESIA (OUTPATIENT)
Dept: SURGERY | Facility: HOSPITAL | Age: 59
DRG: 220 | End: 2019-04-05
Payer: COMMERCIAL

## 2019-04-05 DIAGNOSIS — I35.1 NONRHEUMATIC AORTIC VALVE INSUFFICIENCY: ICD-10-CM

## 2019-04-05 DIAGNOSIS — R00.0 SINUS TACHYCARDIA: ICD-10-CM

## 2019-04-05 DIAGNOSIS — Z98.890 S/P AORTIC VALVE REPAIR: Primary | ICD-10-CM

## 2019-04-05 DIAGNOSIS — R73.9 STRESS HYPERGLYCEMIA: ICD-10-CM

## 2019-04-05 DIAGNOSIS — Z98.890 HISTORY OF HEART SURGERY: ICD-10-CM

## 2019-04-05 DIAGNOSIS — I49.9 ARRHYTHMIA: ICD-10-CM

## 2019-04-05 LAB
ALBUMIN SERPL BCP-MCNC: 3.3 G/DL (ref 3.5–5.2)
ALLENS TEST: ABNORMAL
ALP SERPL-CCNC: 46 U/L (ref 55–135)
ALT SERPL W/O P-5'-P-CCNC: 16 U/L (ref 10–44)
ANION GAP SERPL CALC-SCNC: 10 MMOL/L (ref 8–16)
ANION GAP SERPL CALC-SCNC: 7 MMOL/L (ref 8–16)
APTT BLDCRRT: 24.4 SEC (ref 21–32)
APTT BLDCRRT: 29.7 SEC (ref 21–32)
AST SERPL-CCNC: 37 U/L (ref 10–40)
BASOPHILS # BLD AUTO: 0 K/UL (ref 0–0.2)
BASOPHILS # BLD AUTO: 0 K/UL (ref 0–0.2)
BASOPHILS # BLD AUTO: 0.01 K/UL (ref 0–0.2)
BASOPHILS NFR BLD: 0 % (ref 0–1.9)
BASOPHILS NFR BLD: 0 % (ref 0–1.9)
BASOPHILS NFR BLD: 0.1 % (ref 0–1.9)
BILIRUB SERPL-MCNC: 3.4 MG/DL (ref 0.1–1)
BLD PROD TYP BPU: NORMAL
BLOOD UNIT EXPIRATION DATE: NORMAL
BLOOD UNIT TYPE CODE: 5100
BLOOD UNIT TYPE CODE: 5100
BLOOD UNIT TYPE CODE: 600
BLOOD UNIT TYPE CODE: 6200
BLOOD UNIT TYPE: NORMAL
BUN SERPL-MCNC: 13 MG/DL (ref 6–20)
BUN SERPL-MCNC: 13 MG/DL (ref 6–20)
CALCIUM SERPL-MCNC: 7.8 MG/DL (ref 8.7–10.5)
CALCIUM SERPL-MCNC: 8.4 MG/DL (ref 8.7–10.5)
CHLORIDE SERPL-SCNC: 105 MMOL/L (ref 95–110)
CHLORIDE SERPL-SCNC: 108 MMOL/L (ref 95–110)
CO2 SERPL-SCNC: 26 MMOL/L (ref 23–29)
CO2 SERPL-SCNC: 28 MMOL/L (ref 23–29)
CODING SYSTEM: NORMAL
CREAT SERPL-MCNC: 0.9 MG/DL (ref 0.5–1.4)
CREAT SERPL-MCNC: 0.9 MG/DL (ref 0.5–1.4)
DELSYS: ABNORMAL
DIFFERENTIAL METHOD: ABNORMAL
DISPENSE STATUS: NORMAL
EOSINOPHIL # BLD AUTO: 0 K/UL (ref 0–0.5)
EOSINOPHIL NFR BLD: 0 % (ref 0–8)
EOSINOPHIL NFR BLD: 0 % (ref 0–8)
EOSINOPHIL NFR BLD: 0.1 % (ref 0–8)
ERYTHROCYTE [DISTWIDTH] IN BLOOD BY AUTOMATED COUNT: 13 % (ref 11.5–14.5)
ERYTHROCYTE [DISTWIDTH] IN BLOOD BY AUTOMATED COUNT: 13.3 % (ref 11.5–14.5)
ERYTHROCYTE [DISTWIDTH] IN BLOOD BY AUTOMATED COUNT: 13.5 % (ref 11.5–14.5)
ERYTHROCYTE [SEDIMENTATION RATE] IN BLOOD BY WESTERGREN METHOD: 14 MM/H
EST. GFR  (AFRICAN AMERICAN): >60 ML/MIN/1.73 M^2
EST. GFR  (AFRICAN AMERICAN): >60 ML/MIN/1.73 M^2
EST. GFR  (NON AFRICAN AMERICAN): >60 ML/MIN/1.73 M^2
EST. GFR  (NON AFRICAN AMERICAN): >60 ML/MIN/1.73 M^2
FIBRINOGEN PPP-MCNC: 209 MG/DL (ref 182–366)
FIBRINOGEN PPP-MCNC: 213 MG/DL (ref 182–366)
FIO2: 100
FIO2: 80
GLUCOSE SERPL-MCNC: 103 MG/DL (ref 70–110)
GLUCOSE SERPL-MCNC: 127 MG/DL (ref 70–110)
GLUCOSE SERPL-MCNC: 148 MG/DL (ref 70–110)
GLUCOSE SERPL-MCNC: 152 MG/DL (ref 70–110)
GLUCOSE SERPL-MCNC: 160 MG/DL (ref 70–110)
GLUCOSE SERPL-MCNC: 164 MG/DL (ref 70–110)
GLUCOSE SERPL-MCNC: 164 MG/DL (ref 70–110)
GLUCOSE SERPL-MCNC: 182 MG/DL (ref 70–110)
GLUCOSE SERPL-MCNC: 191 MG/DL (ref 70–110)
GLUCOSE SERPL-MCNC: 193 MG/DL (ref 70–110)
GLUCOSE SERPL-MCNC: 215 MG/DL (ref 70–110)
GLUCOSE SERPL-MCNC: 94 MG/DL (ref 70–110)
HCO3 UR-SCNC: 16.6 MMOL/L (ref 24–28)
HCO3 UR-SCNC: 24.5 MMOL/L (ref 24–28)
HCO3 UR-SCNC: 25 MMOL/L (ref 24–28)
HCO3 UR-SCNC: 25.6 MMOL/L (ref 24–28)
HCO3 UR-SCNC: 26.1 MMOL/L (ref 24–28)
HCO3 UR-SCNC: 26.8 MMOL/L (ref 24–28)
HCO3 UR-SCNC: 26.9 MMOL/L (ref 24–28)
HCO3 UR-SCNC: 27.7 MMOL/L (ref 24–28)
HCO3 UR-SCNC: 27.9 MMOL/L (ref 24–28)
HCO3 UR-SCNC: 28.2 MMOL/L (ref 24–28)
HCO3 UR-SCNC: 28.2 MMOL/L (ref 24–28)
HCT VFR BLD AUTO: 18.4 % (ref 40–54)
HCT VFR BLD AUTO: 22.5 % (ref 40–54)
HCT VFR BLD AUTO: 26.8 % (ref 40–54)
HCT VFR BLD CALC: 16 %PCV (ref 36–54)
HCT VFR BLD CALC: 16 %PCV (ref 36–54)
HCT VFR BLD CALC: 20 %PCV (ref 36–54)
HCT VFR BLD CALC: 20 %PCV (ref 36–54)
HCT VFR BLD CALC: 22 %PCV (ref 36–54)
HCT VFR BLD CALC: 24 %PCV (ref 36–54)
HCT VFR BLD CALC: 25 %PCV (ref 36–54)
HCT VFR BLD CALC: 26 %PCV (ref 36–54)
HCT VFR BLD CALC: 26 %PCV (ref 36–54)
HCT VFR BLD CALC: 33 %PCV (ref 36–54)
HCT VFR BLD CALC: <15 %PCV (ref 36–54)
HGB BLD-MCNC: 6.2 G/DL (ref 14–18)
HGB BLD-MCNC: 7.6 G/DL (ref 14–18)
HGB BLD-MCNC: 9 G/DL (ref 14–18)
IMM GRANULOCYTES # BLD AUTO: 0.01 K/UL (ref 0–0.04)
IMM GRANULOCYTES # BLD AUTO: 0.03 K/UL (ref 0–0.04)
IMM GRANULOCYTES # BLD AUTO: 0.04 K/UL (ref 0–0.04)
IMM GRANULOCYTES NFR BLD AUTO: 0.2 % (ref 0–0.5)
IMM GRANULOCYTES NFR BLD AUTO: 0.4 % (ref 0–0.5)
IMM GRANULOCYTES NFR BLD AUTO: 0.5 % (ref 0–0.5)
INR PPP: 1.2 (ref 0.8–1.2)
INR PPP: 1.3 (ref 0.8–1.2)
LYMPHOCYTES # BLD AUTO: 0.5 K/UL (ref 1–4.8)
LYMPHOCYTES # BLD AUTO: 0.8 K/UL (ref 1–4.8)
LYMPHOCYTES # BLD AUTO: 0.9 K/UL (ref 1–4.8)
LYMPHOCYTES NFR BLD: 10.4 % (ref 18–48)
LYMPHOCYTES NFR BLD: 12.7 % (ref 18–48)
LYMPHOCYTES NFR BLD: 9.8 % (ref 18–48)
MAGNESIUM SERPL-MCNC: 1.9 MG/DL (ref 1.6–2.6)
MAGNESIUM SERPL-MCNC: 2.2 MG/DL (ref 1.6–2.6)
MCH RBC QN AUTO: 31.6 PG (ref 27–31)
MCH RBC QN AUTO: 33 PG (ref 27–31)
MCH RBC QN AUTO: 33.9 PG (ref 27–31)
MCHC RBC AUTO-ENTMCNC: 33.6 G/DL (ref 32–36)
MCHC RBC AUTO-ENTMCNC: 33.7 G/DL (ref 32–36)
MCHC RBC AUTO-ENTMCNC: 33.8 G/DL (ref 32–36)
MCV RBC AUTO: 101 FL (ref 82–98)
MCV RBC AUTO: 94 FL (ref 82–98)
MCV RBC AUTO: 98 FL (ref 82–98)
MODE: ABNORMAL
MONOCYTES # BLD AUTO: 0.5 K/UL (ref 0.3–1)
MONOCYTES # BLD AUTO: 0.5 K/UL (ref 0.3–1)
MONOCYTES # BLD AUTO: 0.7 K/UL (ref 0.3–1)
MONOCYTES NFR BLD: 7.1 % (ref 4–15)
MONOCYTES NFR BLD: 7.7 % (ref 4–15)
MONOCYTES NFR BLD: 9.8 % (ref 4–15)
NEUTROPHILS # BLD AUTO: 3.7 K/UL (ref 1.8–7.7)
NEUTROPHILS # BLD AUTO: 5.6 K/UL (ref 1.8–7.7)
NEUTROPHILS # BLD AUTO: 7 K/UL (ref 1.8–7.7)
NEUTROPHILS NFR BLD: 79.6 % (ref 38–73)
NEUTROPHILS NFR BLD: 79.7 % (ref 38–73)
NEUTROPHILS NFR BLD: 81.9 % (ref 38–73)
NRBC BLD-RTO: 0 /100 WBC
NUM UNITS TRANS FFP: NORMAL
PCO2 BLDA: 30.1 MMHG (ref 35–45)
PCO2 BLDA: 36.5 MMHG (ref 35–45)
PCO2 BLDA: 37 MMHG (ref 35–45)
PCO2 BLDA: 38.1 MMHG (ref 35–45)
PCO2 BLDA: 42.9 MMHG (ref 35–45)
PCO2 BLDA: 43.1 MMHG (ref 35–45)
PCO2 BLDA: 43.6 MMHG (ref 35–45)
PCO2 BLDA: 43.8 MMHG (ref 35–45)
PCO2 BLDA: 44.8 MMHG (ref 35–45)
PCO2 BLDA: 44.8 MMHG (ref 35–45)
PCO2 BLDA: 46.3 MMHG (ref 35–45)
PEEP: 5
PH SMN: 7.35 [PH] (ref 7.35–7.45)
PH SMN: 7.38 [PH] (ref 7.35–7.45)
PH SMN: 7.38 [PH] (ref 7.35–7.45)
PH SMN: 7.39 [PH] (ref 7.35–7.45)
PH SMN: 7.41 [PH] (ref 7.35–7.45)
PH SMN: 7.42 [PH] (ref 7.35–7.45)
PH SMN: 7.43 [PH] (ref 7.35–7.45)
PH SMN: 7.43 [PH] (ref 7.35–7.45)
PH SMN: 7.46 [PH] (ref 7.35–7.45)
PHOSPHATE SERPL-MCNC: 2.6 MG/DL (ref 2.7–4.5)
PHOSPHATE SERPL-MCNC: 3.4 MG/DL (ref 2.7–4.5)
PLATELET # BLD AUTO: 46 K/UL (ref 150–350)
PLATELET # BLD AUTO: 50 K/UL (ref 150–350)
PLATELET # BLD AUTO: 72 K/UL (ref 150–350)
PMV BLD AUTO: 10.4 FL (ref 9.2–12.9)
PMV BLD AUTO: 10.4 FL (ref 9.2–12.9)
PMV BLD AUTO: 11.1 FL (ref 9.2–12.9)
PO2 BLDA: 184 MMHG (ref 80–100)
PO2 BLDA: 186 MMHG (ref 80–100)
PO2 BLDA: 190 MMHG (ref 80–100)
PO2 BLDA: 30 MMHG (ref 80–100)
PO2 BLDA: 416 MMHG (ref 80–100)
PO2 BLDA: 442 MMHG (ref 80–100)
PO2 BLDA: 444 MMHG (ref 80–100)
PO2 BLDA: 449 MMHG (ref 80–100)
PO2 BLDA: 514 MMHG (ref 80–100)
PO2 BLDA: 52 MMHG (ref 40–60)
PO2 BLDA: 554 MMHG (ref 80–100)
POC BE: -9 MMOL/L
POC BE: 0 MMOL/L
POC BE: 0 MMOL/L
POC BE: 1 MMOL/L
POC BE: 2 MMOL/L
POC BE: 3 MMOL/L
POC BE: 3 MMOL/L
POC BE: 4 MMOL/L
POC BE: 4 MMOL/L
POC IONIZED CALCIUM: 0.9 MMOL/L (ref 1.06–1.42)
POC IONIZED CALCIUM: 0.99 MMOL/L (ref 1.06–1.42)
POC IONIZED CALCIUM: 1.01 MMOL/L (ref 1.06–1.42)
POC IONIZED CALCIUM: 1.01 MMOL/L (ref 1.06–1.42)
POC IONIZED CALCIUM: 1.04 MMOL/L (ref 1.06–1.42)
POC IONIZED CALCIUM: 1.1 MMOL/L (ref 1.06–1.42)
POC IONIZED CALCIUM: 1.11 MMOL/L (ref 1.06–1.42)
POC IONIZED CALCIUM: 1.13 MMOL/L (ref 1.06–1.42)
POC IONIZED CALCIUM: 1.13 MMOL/L (ref 1.06–1.42)
POC IONIZED CALCIUM: 1.14 MMOL/L (ref 1.06–1.42)
POC IONIZED CALCIUM: 1.15 MMOL/L (ref 1.06–1.42)
POC SATURATED O2: 100 % (ref 95–100)
POC SATURATED O2: 55 % (ref 95–100)
POC SATURATED O2: 85 % (ref 95–100)
POC TCO2: 17 MMOL/L (ref 23–27)
POC TCO2: 26 MMOL/L (ref 23–27)
POC TCO2: 26 MMOL/L (ref 23–27)
POC TCO2: 27 MMOL/L (ref 23–27)
POC TCO2: 27 MMOL/L (ref 24–29)
POC TCO2: 28 MMOL/L (ref 23–27)
POC TCO2: 28 MMOL/L (ref 23–27)
POC TCO2: 29 MMOL/L (ref 23–27)
POC TCO2: 30 MMOL/L (ref 23–27)
POCT GLUCOSE: 108 MG/DL (ref 70–110)
POCT GLUCOSE: 108 MG/DL (ref 70–110)
POCT GLUCOSE: 110 MG/DL (ref 70–110)
POCT GLUCOSE: 142 MG/DL (ref 70–110)
POCT GLUCOSE: 187 MG/DL (ref 70–110)
POCT GLUCOSE: 83 MG/DL (ref 70–110)
POCT GLUCOSE: 86 MG/DL (ref 70–110)
POCT GLUCOSE: 98 MG/DL (ref 70–110)
POTASSIUM BLD-SCNC: 2.7 MMOL/L (ref 3.5–5.1)
POTASSIUM BLD-SCNC: 3.8 MMOL/L (ref 3.5–5.1)
POTASSIUM BLD-SCNC: 3.9 MMOL/L (ref 3.5–5.1)
POTASSIUM BLD-SCNC: 4.3 MMOL/L (ref 3.5–5.1)
POTASSIUM BLD-SCNC: 4.7 MMOL/L (ref 3.5–5.1)
POTASSIUM BLD-SCNC: 4.8 MMOL/L (ref 3.5–5.1)
POTASSIUM BLD-SCNC: 6.1 MMOL/L (ref 3.5–5.1)
POTASSIUM SERPL-SCNC: 3.9 MMOL/L (ref 3.5–5.1)
POTASSIUM SERPL-SCNC: 4.4 MMOL/L (ref 3.5–5.1)
PROT SERPL-MCNC: 5.4 G/DL (ref 6–8.4)
PROTHROMBIN TIME: 11.7 SEC (ref 9–12.5)
PROTHROMBIN TIME: 12.7 SEC (ref 9–12.5)
RBC # BLD AUTO: 1.83 M/UL (ref 4.6–6.2)
RBC # BLD AUTO: 2.3 M/UL (ref 4.6–6.2)
RBC # BLD AUTO: 2.85 M/UL (ref 4.6–6.2)
SAMPLE: ABNORMAL
SITE: ABNORMAL
SODIUM BLD-SCNC: 136 MMOL/L (ref 136–145)
SODIUM BLD-SCNC: 138 MMOL/L (ref 136–145)
SODIUM BLD-SCNC: 138 MMOL/L (ref 136–145)
SODIUM BLD-SCNC: 139 MMOL/L (ref 136–145)
SODIUM BLD-SCNC: 139 MMOL/L (ref 136–145)
SODIUM BLD-SCNC: 140 MMOL/L (ref 136–145)
SODIUM BLD-SCNC: 141 MMOL/L (ref 136–145)
SODIUM BLD-SCNC: 141 MMOL/L (ref 136–145)
SODIUM BLD-SCNC: 143 MMOL/L (ref 136–145)
SODIUM BLD-SCNC: 144 MMOL/L (ref 136–145)
SODIUM BLD-SCNC: 146 MMOL/L (ref 136–145)
SODIUM SERPL-SCNC: 141 MMOL/L (ref 136–145)
SODIUM SERPL-SCNC: 143 MMOL/L (ref 136–145)
TRANS ERYTHROCYTES VOL PATIENT: NORMAL ML
TRANS ERYTHROCYTES VOL PATIENT: NORMAL ML
TRANS PLATPHERESIS VOL PATIENT: NORMAL ML
UNIT NUMBER: NORMAL
UNIT NUMBER: NORMAL
VT: 400
WBC # BLD AUTO: 4.7 K/UL (ref 3.9–12.7)
WBC # BLD AUTO: 7.03 K/UL (ref 3.9–12.7)
WBC # BLD AUTO: 8.49 K/UL (ref 3.9–12.7)

## 2019-04-05 PROCEDURE — 27200953 HC CARDIOPLEGIA SYSTEM

## 2019-04-05 PROCEDURE — 27000221 HC OXYGEN, UP TO 24 HOURS

## 2019-04-05 PROCEDURE — 84295 ASSAY OF SERUM SODIUM: CPT

## 2019-04-05 PROCEDURE — 36592 COLLECT BLOOD FROM PICC: CPT

## 2019-04-05 PROCEDURE — 93312 PR ECHO HEART,TRANSESOPHAGEAL: ICD-10-PCS | Mod: 26,59,, | Performed by: ANESTHESIOLOGY

## 2019-04-05 PROCEDURE — 85610 PROTHROMBIN TIME: CPT

## 2019-04-05 PROCEDURE — P9017 PLASMA 1 DONOR FRZ W/IN 8 HR: HCPCS

## 2019-04-05 PROCEDURE — C1751 CATH, INF, PER/CENT/MIDLINE: HCPCS | Performed by: ANESTHESIOLOGY

## 2019-04-05 PROCEDURE — 85384 FIBRINOGEN ACTIVITY: CPT | Mod: 91

## 2019-04-05 PROCEDURE — 33863 ASCENDING AORTIC GRAFT: CPT | Mod: ,,, | Performed by: THORACIC SURGERY (CARDIOTHORACIC VASCULAR SURGERY)

## 2019-04-05 PROCEDURE — 85610 PROTHROMBIN TIME: CPT | Mod: 91

## 2019-04-05 PROCEDURE — 25000003 PHARM REV CODE 250: Performed by: STUDENT IN AN ORGANIZED HEALTH CARE EDUCATION/TRAINING PROGRAM

## 2019-04-05 PROCEDURE — 99223 1ST HOSP IP/OBS HIGH 75: CPT | Mod: ,,, | Performed by: NURSE PRACTITIONER

## 2019-04-05 PROCEDURE — 76942 PR U/S GUIDANCE FOR NEEDLE GUIDANCE: ICD-10-PCS | Mod: 26,,, | Performed by: ANESTHESIOLOGY

## 2019-04-05 PROCEDURE — 99900026 HC AIRWAY MAINTENANCE (STAT)

## 2019-04-05 PROCEDURE — 84132 ASSAY OF SERUM POTASSIUM: CPT

## 2019-04-05 PROCEDURE — 86920 COMPATIBILITY TEST SPIN: CPT

## 2019-04-05 PROCEDURE — 93503 INSERT/PLACE HEART CATHETER: CPT | Mod: 59,,, | Performed by: ANESTHESIOLOGY

## 2019-04-05 PROCEDURE — 63600175 PHARM REV CODE 636 W HCPCS: Mod: JG | Performed by: STUDENT IN AN ORGANIZED HEALTH CARE EDUCATION/TRAINING PROGRAM

## 2019-04-05 PROCEDURE — P9021 RED BLOOD CELLS UNIT: HCPCS

## 2019-04-05 PROCEDURE — 83735 ASSAY OF MAGNESIUM: CPT

## 2019-04-05 PROCEDURE — 27201423 OPTIME MED/SURG SUP & DEVICES STERILE SUPPLY: Performed by: THORACIC SURGERY (CARDIOTHORACIC VASCULAR SURGERY)

## 2019-04-05 PROCEDURE — 63600175 PHARM REV CODE 636 W HCPCS: Performed by: STUDENT IN AN ORGANIZED HEALTH CARE EDUCATION/TRAINING PROGRAM

## 2019-04-05 PROCEDURE — 84100 ASSAY OF PHOSPHORUS: CPT

## 2019-04-05 PROCEDURE — P9045 ALBUMIN (HUMAN), 5%, 250 ML: HCPCS | Mod: JG | Performed by: STUDENT IN AN ORGANIZED HEALTH CARE EDUCATION/TRAINING PROGRAM

## 2019-04-05 PROCEDURE — 82803 BLOOD GASES ANY COMBINATION: CPT

## 2019-04-05 PROCEDURE — P9035 PLATELET PHERES LEUKOREDUCED: HCPCS

## 2019-04-05 PROCEDURE — 25000003 PHARM REV CODE 250: Performed by: ANESTHESIOLOGY

## 2019-04-05 PROCEDURE — 99900035 HC TECH TIME PER 15 MIN (STAT)

## 2019-04-05 PROCEDURE — 94640 AIRWAY INHALATION TREATMENT: CPT

## 2019-04-05 PROCEDURE — 27000175 HC ADULT BYPASS PUMP

## 2019-04-05 PROCEDURE — 27100025 HC TUBING, SET FLUID WARMER: Performed by: ANESTHESIOLOGY

## 2019-04-05 PROCEDURE — 80053 COMPREHEN METABOLIC PANEL: CPT

## 2019-04-05 PROCEDURE — 86985 SPLIT BLOOD OR PRODUCTS: CPT

## 2019-04-05 PROCEDURE — 85014 HEMATOCRIT: CPT

## 2019-04-05 PROCEDURE — P9012 CRYOPRECIPITATE EACH UNIT: HCPCS

## 2019-04-05 PROCEDURE — D9220A PRA ANESTHESIA: Mod: ,,, | Performed by: ANESTHESIOLOGY

## 2019-04-05 PROCEDURE — 20000000 HC ICU ROOM

## 2019-04-05 PROCEDURE — 93010 ELECTROCARDIOGRAM REPORT: CPT | Mod: ,,, | Performed by: INTERNAL MEDICINE

## 2019-04-05 PROCEDURE — C9113 INJ PANTOPRAZOLE SODIUM, VIA: HCPCS | Performed by: STUDENT IN AN ORGANIZED HEALTH CARE EDUCATION/TRAINING PROGRAM

## 2019-04-05 PROCEDURE — 36000712 HC OR TIME LEV V 1ST 15 MIN: Performed by: THORACIC SURGERY (CARDIOTHORACIC VASCULAR SURGERY)

## 2019-04-05 PROCEDURE — 83735 ASSAY OF MAGNESIUM: CPT | Mod: 91

## 2019-04-05 PROCEDURE — 85025 COMPLETE CBC W/AUTO DIFF WBC: CPT | Mod: 91

## 2019-04-05 PROCEDURE — 93005 ELECTROCARDIOGRAM TRACING: CPT

## 2019-04-05 PROCEDURE — 82330 ASSAY OF CALCIUM: CPT

## 2019-04-05 PROCEDURE — P9011 BLOOD SPLIT UNIT: HCPCS

## 2019-04-05 PROCEDURE — 27201037 HC PRESSURE MONITORING SET UP

## 2019-04-05 PROCEDURE — 88305 TISSUE EXAM BY PATHOLOGIST: CPT | Mod: 26,,, | Performed by: PATHOLOGY

## 2019-04-05 PROCEDURE — 86850 RBC ANTIBODY SCREEN: CPT

## 2019-04-05 PROCEDURE — 85730 THROMBOPLASTIN TIME PARTIAL: CPT | Mod: 91

## 2019-04-05 PROCEDURE — 85520 HEPARIN ASSAY: CPT

## 2019-04-05 PROCEDURE — 36620 INSERTION CATHETER ARTERY: CPT | Mod: 59,,, | Performed by: ANESTHESIOLOGY

## 2019-04-05 PROCEDURE — C2618 PROBE/NEEDLE, CRYO: HCPCS | Performed by: THORACIC SURGERY (CARDIOTHORACIC VASCULAR SURGERY)

## 2019-04-05 PROCEDURE — 25000242 PHARM REV CODE 250 ALT 637 W/ HCPCS: Performed by: STUDENT IN AN ORGANIZED HEALTH CARE EDUCATION/TRAINING PROGRAM

## 2019-04-05 PROCEDURE — 37000008 HC ANESTHESIA 1ST 15 MINUTES: Performed by: THORACIC SURGERY (CARDIOTHORACIC VASCULAR SURGERY)

## 2019-04-05 PROCEDURE — 27200750 HC INSULATED NEEDLE/ STIMUPLEX: Performed by: ANESTHESIOLOGY

## 2019-04-05 PROCEDURE — 37799 UNLISTED PX VASCULAR SURGERY: CPT

## 2019-04-05 PROCEDURE — 36000713 HC OR TIME LEV V EA ADD 15 MIN: Performed by: THORACIC SURGERY (CARDIOTHORACIC VASCULAR SURGERY)

## 2019-04-05 PROCEDURE — L8670 VASCULAR GRAFT, SYNTHETIC: HCPCS | Performed by: THORACIC SURGERY (CARDIOTHORACIC VASCULAR SURGERY)

## 2019-04-05 PROCEDURE — 25000003 PHARM REV CODE 250: Performed by: THORACIC SURGERY (CARDIOTHORACIC VASCULAR SURGERY)

## 2019-04-05 PROCEDURE — 36620 TEE: ICD-10-PCS | Mod: 59,,, | Performed by: ANESTHESIOLOGY

## 2019-04-05 PROCEDURE — 93503 PR INSERT/PLACE FLOW DIRECT CATH: ICD-10-PCS | Mod: 59,,, | Performed by: ANESTHESIOLOGY

## 2019-04-05 PROCEDURE — 27100088 HC CELL SAVER

## 2019-04-05 PROCEDURE — 63600175 PHARM REV CODE 636 W HCPCS: Performed by: ANESTHESIOLOGY

## 2019-04-05 PROCEDURE — D9220A PRA ANESTHESIA: ICD-10-PCS | Mod: ,,, | Performed by: ANESTHESIOLOGY

## 2019-04-05 PROCEDURE — 93010 EKG 12-LEAD: ICD-10-PCS | Mod: ,,, | Performed by: INTERNAL MEDICINE

## 2019-04-05 PROCEDURE — 94761 N-INVAS EAR/PLS OXIMETRY MLT: CPT

## 2019-04-05 PROCEDURE — C1729 CATH, DRAINAGE: HCPCS | Performed by: THORACIC SURGERY (CARDIOTHORACIC VASCULAR SURGERY)

## 2019-04-05 PROCEDURE — 80048 BASIC METABOLIC PNL TOTAL CA: CPT

## 2019-04-05 PROCEDURE — 93041 RHYTHM ECG TRACING: CPT

## 2019-04-05 PROCEDURE — 33863 PR ASCEND AORTA GRAFT W ROOT REPLACMENT.VALVE CONDUIT/CORON RECONSTRUCT: ICD-10-PCS | Mod: ,,, | Performed by: THORACIC SURGERY (CARDIOTHORACIC VASCULAR SURGERY)

## 2019-04-05 PROCEDURE — 27000191 HC C-V MONITORING

## 2019-04-05 PROCEDURE — 99223 PR INITIAL HOSPITAL CARE,LEVL III: ICD-10-PCS | Mod: ,,, | Performed by: NURSE PRACTITIONER

## 2019-04-05 PROCEDURE — 64450 NJX AA&/STRD OTHER PN/BRANCH: CPT | Mod: 50,59,, | Performed by: ANESTHESIOLOGY

## 2019-04-05 PROCEDURE — 88305 TISSUE SPECIMEN TO PATHOLOGY - SURGERY: ICD-10-PCS | Mod: 26,,, | Performed by: PATHOLOGY

## 2019-04-05 PROCEDURE — 85384 FIBRINOGEN ACTIVITY: CPT

## 2019-04-05 PROCEDURE — 76942 ECHO GUIDE FOR BIOPSY: CPT | Mod: 26,,, | Performed by: ANESTHESIOLOGY

## 2019-04-05 PROCEDURE — 64450 PR NERVE BLOCK INJ, ANES/STEROID, OTHER PERIPHERAL: ICD-10-PCS | Mod: 50,59,, | Performed by: ANESTHESIOLOGY

## 2019-04-05 PROCEDURE — 84100 ASSAY OF PHOSPHORUS: CPT | Mod: 91

## 2019-04-05 PROCEDURE — 94002 VENT MGMT INPAT INIT DAY: CPT

## 2019-04-05 PROCEDURE — 37000009 HC ANESTHESIA EA ADD 15 MINS: Performed by: THORACIC SURGERY (CARDIOTHORACIC VASCULAR SURGERY)

## 2019-04-05 PROCEDURE — 88305 TISSUE EXAM BY PATHOLOGIST: CPT | Performed by: PATHOLOGY

## 2019-04-05 PROCEDURE — 85730 THROMBOPLASTIN TIME PARTIAL: CPT

## 2019-04-05 PROCEDURE — 93312 ECHO TRANSESOPHAGEAL: CPT | Mod: 26,59,, | Performed by: ANESTHESIOLOGY

## 2019-04-05 PROCEDURE — 25000003 PHARM REV CODE 250

## 2019-04-05 PROCEDURE — 27800595 HC HEART VALVES

## 2019-04-05 DEVICE — GRAFT VALSALVIA 30MM: Type: IMPLANTABLE DEVICE | Site: HEART | Status: FUNCTIONAL

## 2019-04-05 DEVICE — IMPLANTABLE DEVICE: Type: IMPLANTABLE DEVICE | Site: HEART | Status: FUNCTIONAL

## 2019-04-05 DEVICE — VALVE MOSAIC ULTRA AORTIC 27MM: Type: IMPLANTABLE DEVICE | Site: HEART | Status: FUNCTIONAL

## 2019-04-05 RX ORDER — HYDROCODONE BITARTRATE AND ACETAMINOPHEN 500; 5 MG/1; MG/1
TABLET ORAL
Status: DISCONTINUED | OUTPATIENT
Start: 2019-04-05 | End: 2019-04-05

## 2019-04-05 RX ORDER — EPINEPHRINE 1 MG/ML
INJECTION, SOLUTION INTRACARDIAC; INTRAMUSCULAR; INTRAVENOUS; SUBCUTANEOUS
Status: DISCONTINUED | OUTPATIENT
Start: 2019-04-05 | End: 2019-04-05

## 2019-04-05 RX ORDER — ASPIRIN 300 MG/1
300 SUPPOSITORY RECTAL ONCE AS NEEDED
Status: CANCELLED | OUTPATIENT
Start: 2019-04-05 | End: 2030-08-31

## 2019-04-05 RX ORDER — OXYCODONE HYDROCHLORIDE 5 MG/1
10 TABLET ORAL EVERY 4 HOURS PRN
Status: DISCONTINUED | OUTPATIENT
Start: 2019-04-05 | End: 2019-04-10 | Stop reason: HOSPADM

## 2019-04-05 RX ORDER — FENTANYL CITRATE 50 UG/ML
25 INJECTION, SOLUTION INTRAMUSCULAR; INTRAVENOUS
Status: DISCONTINUED | OUTPATIENT
Start: 2019-04-05 | End: 2019-04-06

## 2019-04-05 RX ORDER — ACETAMINOPHEN 325 MG/1
650 TABLET ORAL EVERY 4 HOURS PRN
Status: DISCONTINUED | OUTPATIENT
Start: 2019-04-05 | End: 2019-04-10 | Stop reason: HOSPADM

## 2019-04-05 RX ORDER — METOPROLOL TARTRATE 25 MG/1
25 TABLET, FILM COATED ORAL
Status: DISCONTINUED | OUTPATIENT
Start: 2019-04-05 | End: 2019-04-05 | Stop reason: HOSPADM

## 2019-04-05 RX ORDER — SODIUM CHLORIDE 0.9 % (FLUSH) 0.9 %
10 SYRINGE (ML) INJECTION
Status: DISCONTINUED | OUTPATIENT
Start: 2019-04-05 | End: 2019-04-10

## 2019-04-05 RX ORDER — BISACODYL 10 MG
10 SUPPOSITORY, RECTAL RECTAL EVERY 12 HOURS PRN
Status: DISCONTINUED | OUTPATIENT
Start: 2019-04-05 | End: 2019-04-10

## 2019-04-05 RX ORDER — CEFAZOLIN SODIUM 1 G/3ML
INJECTION, POWDER, FOR SOLUTION INTRAMUSCULAR; INTRAVENOUS
Status: DISCONTINUED | OUTPATIENT
Start: 2019-04-05 | End: 2019-04-05

## 2019-04-05 RX ORDER — MUPIROCIN 20 MG/G
1 OINTMENT TOPICAL 2 TIMES DAILY
Status: DISCONTINUED | OUTPATIENT
Start: 2019-04-05 | End: 2019-04-10 | Stop reason: HOSPADM

## 2019-04-05 RX ORDER — GLYCOPYRROLATE 0.2 MG/ML
INJECTION INTRAMUSCULAR; INTRAVENOUS
Status: DISCONTINUED | OUTPATIENT
Start: 2019-04-05 | End: 2019-04-05

## 2019-04-05 RX ORDER — DOCUSATE SODIUM 100 MG/1
100 CAPSULE, LIQUID FILLED ORAL 2 TIMES DAILY
Status: DISCONTINUED | OUTPATIENT
Start: 2019-04-05 | End: 2019-04-10 | Stop reason: HOSPADM

## 2019-04-05 RX ORDER — POTASSIUM CHLORIDE 14.9 MG/ML
60 INJECTION INTRAVENOUS
Status: CANCELLED | OUTPATIENT
Start: 2019-04-05

## 2019-04-05 RX ORDER — PROPOFOL 10 MG/ML
VIAL (ML) INTRAVENOUS
Status: DISCONTINUED | OUTPATIENT
Start: 2019-04-05 | End: 2019-04-05

## 2019-04-05 RX ORDER — SODIUM CHLORIDE 9 MG/ML
INJECTION, SOLUTION INTRAVENOUS CONTINUOUS
Status: DISCONTINUED | OUTPATIENT
Start: 2019-04-05 | End: 2019-04-08

## 2019-04-05 RX ORDER — ONDANSETRON 2 MG/ML
4 INJECTION INTRAMUSCULAR; INTRAVENOUS EVERY 12 HOURS PRN
Status: DISCONTINUED | OUTPATIENT
Start: 2019-04-05 | End: 2019-04-10

## 2019-04-05 RX ORDER — ALBUMIN HUMAN 50 G/1000ML
500 SOLUTION INTRAVENOUS ONCE AS NEEDED
Status: CANCELLED | OUTPATIENT
Start: 2019-04-05 | End: 2030-08-31

## 2019-04-05 RX ORDER — POLYETHYLENE GLYCOL 3350 17 G/17G
17 POWDER, FOR SOLUTION ORAL DAILY
Status: CANCELLED | OUTPATIENT
Start: 2019-04-05

## 2019-04-05 RX ORDER — ASPIRIN 325 MG
325 TABLET ORAL ONCE
Status: CANCELLED | OUTPATIENT
Start: 2019-04-05 | End: 2019-04-05

## 2019-04-05 RX ORDER — DEXTROSE MONOHYDRATE, SODIUM CHLORIDE, AND POTASSIUM CHLORIDE 50; 1.49; 4.5 G/1000ML; G/1000ML; G/1000ML
INJECTION, SOLUTION INTRAVENOUS CONTINUOUS
Status: CANCELLED | OUTPATIENT
Start: 2019-04-05

## 2019-04-05 RX ORDER — IPRATROPIUM BROMIDE AND ALBUTEROL SULFATE 2.5; .5 MG/3ML; MG/3ML
3 SOLUTION RESPIRATORY (INHALATION) EVERY 4 HOURS
Status: CANCELLED | OUTPATIENT
Start: 2019-04-05 | End: 2019-04-06

## 2019-04-05 RX ORDER — FENTANYL CITRATE 50 UG/ML
50 INJECTION, SOLUTION INTRAMUSCULAR; INTRAVENOUS
Status: CANCELLED | OUTPATIENT
Start: 2019-04-07

## 2019-04-05 RX ORDER — POTASSIUM CHLORIDE 14.9 MG/ML
20 INJECTION INTRAVENOUS
Status: DISCONTINUED | OUTPATIENT
Start: 2019-04-05 | End: 2019-04-09

## 2019-04-05 RX ORDER — OXYCODONE HYDROCHLORIDE 5 MG/1
10 TABLET ORAL EVERY 4 HOURS PRN
Status: CANCELLED | OUTPATIENT
Start: 2019-04-05

## 2019-04-05 RX ORDER — ASPIRIN 300 MG/1
300 SUPPOSITORY RECTAL ONCE AS NEEDED
Status: DISCONTINUED | OUTPATIENT
Start: 2019-04-05 | End: 2019-04-10

## 2019-04-05 RX ORDER — CEFAZOLIN SODIUM 1 G/3ML
2 INJECTION, POWDER, FOR SOLUTION INTRAMUSCULAR; INTRAVENOUS
Status: COMPLETED | OUTPATIENT
Start: 2019-04-05 | End: 2019-04-07

## 2019-04-05 RX ORDER — FENTANYL CITRATE 50 UG/ML
INJECTION, SOLUTION INTRAMUSCULAR; INTRAVENOUS
Status: DISCONTINUED | OUTPATIENT
Start: 2019-04-05 | End: 2019-04-05

## 2019-04-05 RX ORDER — PANTOPRAZOLE SODIUM 40 MG/10ML
40 INJECTION, POWDER, LYOPHILIZED, FOR SOLUTION INTRAVENOUS DAILY
Status: CANCELLED | OUTPATIENT
Start: 2019-04-05

## 2019-04-05 RX ORDER — IPRATROPIUM BROMIDE AND ALBUTEROL SULFATE 2.5; .5 MG/3ML; MG/3ML
3 SOLUTION RESPIRATORY (INHALATION) EVERY 4 HOURS PRN
Status: CANCELLED | OUTPATIENT
Start: 2019-04-05 | End: 2019-04-06

## 2019-04-05 RX ORDER — PROPOFOL 10 MG/ML
5 INJECTION, EMULSION INTRAVENOUS CONTINUOUS
Status: CANCELLED | OUTPATIENT
Start: 2019-04-05

## 2019-04-05 RX ORDER — FENTANYL CITRATE 50 UG/ML
25 INJECTION, SOLUTION INTRAMUSCULAR; INTRAVENOUS
Status: CANCELLED | OUTPATIENT
Start: 2019-04-05

## 2019-04-05 RX ORDER — IPRATROPIUM BROMIDE AND ALBUTEROL SULFATE 2.5; .5 MG/3ML; MG/3ML
3 SOLUTION RESPIRATORY (INHALATION) EVERY 4 HOURS
Status: COMPLETED | OUTPATIENT
Start: 2019-04-05 | End: 2019-04-06

## 2019-04-05 RX ORDER — TRANEXAMIC ACID 100 MG/ML
INJECTION, SOLUTION INTRAVENOUS
Status: DISCONTINUED | OUTPATIENT
Start: 2019-04-05 | End: 2019-04-05

## 2019-04-05 RX ORDER — PROTAMINE SULFATE 10 MG/ML
INJECTION, SOLUTION INTRAVENOUS
Status: DISCONTINUED | OUTPATIENT
Start: 2019-04-05 | End: 2019-04-05

## 2019-04-05 RX ORDER — METOCLOPRAMIDE HYDROCHLORIDE 5 MG/ML
5 INJECTION INTRAMUSCULAR; INTRAVENOUS EVERY 6 HOURS PRN
Status: DISCONTINUED | OUTPATIENT
Start: 2019-04-05 | End: 2019-04-10

## 2019-04-05 RX ORDER — MAGNESIUM SULFATE HEPTAHYDRATE 40 MG/ML
2 INJECTION, SOLUTION INTRAVENOUS
Status: DISCONTINUED | OUTPATIENT
Start: 2019-04-05 | End: 2019-04-09

## 2019-04-05 RX ORDER — MIDAZOLAM HYDROCHLORIDE 1 MG/ML
INJECTION INTRAMUSCULAR; INTRAVENOUS
Status: DISCONTINUED | OUTPATIENT
Start: 2019-04-05 | End: 2019-04-05

## 2019-04-05 RX ORDER — ASPIRIN 325 MG
325 TABLET ORAL DAILY
Status: CANCELLED | OUTPATIENT
Start: 2019-04-05

## 2019-04-05 RX ORDER — MUPIROCIN 20 MG/G
1 OINTMENT TOPICAL
Status: CANCELLED | OUTPATIENT
Start: 2019-04-05

## 2019-04-05 RX ORDER — LIDOCAINE HYDROCHLORIDE 10 MG/ML
1 INJECTION, SOLUTION EPIDURAL; INFILTRATION; INTRACAUDAL; PERINEURAL
Status: CANCELLED | OUTPATIENT
Start: 2019-04-05

## 2019-04-05 RX ORDER — ASPIRIN 325 MG
325 TABLET, DELAYED RELEASE (ENTERIC COATED) ORAL DAILY
Status: CANCELLED | OUTPATIENT
Start: 2019-04-05

## 2019-04-05 RX ORDER — LIDOCAINE HCL/PF 100 MG/5ML
SYRINGE (ML) INTRAVENOUS
Status: DISCONTINUED | OUTPATIENT
Start: 2019-04-05 | End: 2019-04-05

## 2019-04-05 RX ORDER — ACETAMINOPHEN 650 MG/20.3ML
650 LIQUID ORAL ONCE
Status: COMPLETED | OUTPATIENT
Start: 2019-04-05 | End: 2019-04-05

## 2019-04-05 RX ORDER — NICARDIPINE HYDROCHLORIDE 0.2 MG/ML
3 INJECTION INTRAVENOUS CONTINUOUS
Status: DISCONTINUED | OUTPATIENT
Start: 2019-04-05 | End: 2019-04-08

## 2019-04-05 RX ORDER — POTASSIUM CHLORIDE 29.8 MG/ML
40 INJECTION INTRAVENOUS
Status: CANCELLED | OUTPATIENT
Start: 2019-04-05

## 2019-04-05 RX ORDER — IPRATROPIUM BROMIDE AND ALBUTEROL SULFATE 2.5; .5 MG/3ML; MG/3ML
3 SOLUTION RESPIRATORY (INHALATION) EVERY 4 HOURS PRN
Status: DISPENSED | OUTPATIENT
Start: 2019-04-05 | End: 2019-04-06

## 2019-04-05 RX ORDER — POTASSIUM CHLORIDE 14.9 MG/ML
20 INJECTION INTRAVENOUS
Status: CANCELLED | OUTPATIENT
Start: 2019-04-05

## 2019-04-05 RX ORDER — SODIUM CHLORIDE 0.9 % (FLUSH) 0.9 %
10 SYRINGE (ML) INJECTION
Status: CANCELLED | OUTPATIENT
Start: 2019-04-05

## 2019-04-05 RX ORDER — MUPIROCIN 20 MG/G
1 OINTMENT TOPICAL 2 TIMES DAILY
Status: CANCELLED | OUTPATIENT
Start: 2019-04-05 | End: 2019-04-10

## 2019-04-05 RX ORDER — POTASSIUM CHLORIDE 750 MG/1
20 TABLET, EXTENDED RELEASE ORAL EVERY 12 HOURS
Status: CANCELLED | OUTPATIENT
Start: 2019-04-05

## 2019-04-05 RX ORDER — LIDOCAINE HYDROCHLORIDE 10 MG/ML
1 INJECTION, SOLUTION EPIDURAL; INFILTRATION; INTRACAUDAL; PERINEURAL
Status: COMPLETED | OUTPATIENT
Start: 2019-04-05 | End: 2019-04-05

## 2019-04-05 RX ORDER — ROCURONIUM BROMIDE 10 MG/ML
INJECTION, SOLUTION INTRAVENOUS
Status: DISCONTINUED | OUTPATIENT
Start: 2019-04-05 | End: 2019-04-05

## 2019-04-05 RX ORDER — PROPOFOL 10 MG/ML
5 INJECTION, EMULSION INTRAVENOUS CONTINUOUS
Status: DISCONTINUED | OUTPATIENT
Start: 2019-04-05 | End: 2019-04-06

## 2019-04-05 RX ORDER — NICARDIPINE HYDROCHLORIDE 0.2 MG/ML
INJECTION INTRAVENOUS
Status: COMPLETED
Start: 2019-04-05 | End: 2019-04-05

## 2019-04-05 RX ORDER — POTASSIUM CHLORIDE 29.8 MG/ML
40 INJECTION INTRAVENOUS
Status: DISCONTINUED | OUTPATIENT
Start: 2019-04-05 | End: 2019-04-09

## 2019-04-05 RX ORDER — OXYCODONE HYDROCHLORIDE 5 MG/1
5 TABLET ORAL EVERY 4 HOURS PRN
Status: CANCELLED | OUTPATIENT
Start: 2019-04-05

## 2019-04-05 RX ORDER — HEPARIN SODIUM 1000 [USP'U]/ML
INJECTION, SOLUTION INTRAVENOUS; SUBCUTANEOUS
Status: DISCONTINUED | OUTPATIENT
Start: 2019-04-05 | End: 2019-04-05

## 2019-04-05 RX ORDER — FENTANYL CITRATE 50 UG/ML
25 INJECTION, SOLUTION INTRAMUSCULAR; INTRAVENOUS
Status: CANCELLED | OUTPATIENT
Start: 2019-04-05 | End: 2019-04-06

## 2019-04-05 RX ORDER — PANTOPRAZOLE SODIUM 40 MG/10ML
40 INJECTION, POWDER, LYOPHILIZED, FOR SOLUTION INTRAVENOUS DAILY
Status: DISCONTINUED | OUTPATIENT
Start: 2019-04-05 | End: 2019-04-07

## 2019-04-05 RX ORDER — ALBUMIN HUMAN 50 G/1000ML
500 SOLUTION INTRAVENOUS ONCE AS NEEDED
Status: COMPLETED | OUTPATIENT
Start: 2019-04-05 | End: 2019-04-05

## 2019-04-05 RX ORDER — OXYCODONE HYDROCHLORIDE 5 MG/1
5 TABLET ORAL EVERY 4 HOURS PRN
Status: DISCONTINUED | OUTPATIENT
Start: 2019-04-05 | End: 2019-04-10 | Stop reason: HOSPADM

## 2019-04-05 RX ORDER — POLYETHYLENE GLYCOL 3350 17 G/17G
17 POWDER, FOR SOLUTION ORAL DAILY
Status: DISCONTINUED | OUTPATIENT
Start: 2019-04-05 | End: 2019-04-10 | Stop reason: HOSPADM

## 2019-04-05 RX ORDER — DOCUSATE SODIUM 100 MG/1
100 CAPSULE, LIQUID FILLED ORAL 2 TIMES DAILY
Status: CANCELLED | OUTPATIENT
Start: 2019-04-05

## 2019-04-05 RX ORDER — DEXTROSE MONOHYDRATE, SODIUM CHLORIDE, AND POTASSIUM CHLORIDE 50; 1.49; 4.5 G/1000ML; G/1000ML; G/1000ML
INJECTION, SOLUTION INTRAVENOUS CONTINUOUS
Status: DISCONTINUED | OUTPATIENT
Start: 2019-04-05 | End: 2019-04-08

## 2019-04-05 RX ORDER — TRANEXAMIC ACID 100 MG/ML
INJECTION, SOLUTION INTRAVENOUS CONTINUOUS PRN
Status: DISCONTINUED | OUTPATIENT
Start: 2019-04-05 | End: 2019-04-05

## 2019-04-05 RX ORDER — SODIUM CHLORIDE 9 MG/ML
INJECTION, SOLUTION INTRAVENOUS CONTINUOUS
Status: CANCELLED | OUTPATIENT
Start: 2019-04-05

## 2019-04-05 RX ORDER — MUPIROCIN 20 MG/G
1 OINTMENT TOPICAL
Status: COMPLETED | OUTPATIENT
Start: 2019-04-05 | End: 2019-04-05

## 2019-04-05 RX ORDER — MAGNESIUM SULFATE HEPTAHYDRATE 40 MG/ML
4 INJECTION, SOLUTION INTRAVENOUS
Status: DISCONTINUED | OUTPATIENT
Start: 2019-04-05 | End: 2019-04-09

## 2019-04-05 RX ORDER — ONDANSETRON 2 MG/ML
4 INJECTION INTRAMUSCULAR; INTRAVENOUS EVERY 12 HOURS PRN
Status: CANCELLED | OUTPATIENT
Start: 2019-04-05

## 2019-04-05 RX ORDER — HYDROCODONE BITARTRATE AND ACETAMINOPHEN 500; 5 MG/1; MG/1
TABLET ORAL
Status: DISCONTINUED | OUTPATIENT
Start: 2019-04-05 | End: 2019-04-06

## 2019-04-05 RX ORDER — POTASSIUM CHLORIDE 14.9 MG/ML
60 INJECTION INTRAVENOUS
Status: DISCONTINUED | OUTPATIENT
Start: 2019-04-05 | End: 2019-04-09

## 2019-04-05 RX ORDER — ATORVASTATIN CALCIUM 10 MG/1
40 TABLET, FILM COATED ORAL NIGHTLY
Status: CANCELLED | OUTPATIENT
Start: 2019-04-05

## 2019-04-05 RX ORDER — KETAMINE HYDROCHLORIDE 10 MG/ML
INJECTION, SOLUTION INTRAMUSCULAR; INTRAVENOUS
Status: DISCONTINUED | OUTPATIENT
Start: 2019-04-05 | End: 2019-04-05

## 2019-04-05 RX ORDER — PANTOPRAZOLE SODIUM 40 MG/1
40 TABLET, DELAYED RELEASE ORAL
Status: CANCELLED | OUTPATIENT
Start: 2019-04-05

## 2019-04-05 RX ORDER — ASPIRIN 325 MG
325 TABLET ORAL ONCE
Status: COMPLETED | OUTPATIENT
Start: 2019-04-05 | End: 2019-04-05

## 2019-04-05 RX ORDER — ASPIRIN 325 MG
325 TABLET ORAL DAILY
Status: DISCONTINUED | OUTPATIENT
Start: 2019-04-05 | End: 2019-04-10 | Stop reason: HOSPADM

## 2019-04-05 RX ORDER — CEFAZOLIN SODIUM 1 G/3ML
2 INJECTION, POWDER, FOR SOLUTION INTRAMUSCULAR; INTRAVENOUS
Status: DISCONTINUED | OUTPATIENT
Start: 2019-04-05 | End: 2019-04-05 | Stop reason: HOSPADM

## 2019-04-05 RX ORDER — NEOSTIGMINE METHYLSULFATE 1 MG/ML
INJECTION, SOLUTION INTRAVENOUS
Status: DISCONTINUED | OUTPATIENT
Start: 2019-04-05 | End: 2019-04-05

## 2019-04-05 RX ORDER — PROPOFOL 10 MG/ML
VIAL (ML) INTRAVENOUS CONTINUOUS PRN
Status: DISCONTINUED | OUTPATIENT
Start: 2019-04-05 | End: 2019-04-05

## 2019-04-05 RX ORDER — METOPROLOL TARTRATE 25 MG/1
25 TABLET ORAL
Status: CANCELLED | OUTPATIENT
Start: 2019-04-05

## 2019-04-05 RX ORDER — ACETAMINOPHEN 325 MG/1
650 TABLET ORAL EVERY 4 HOURS PRN
Status: CANCELLED | OUTPATIENT
Start: 2019-04-05

## 2019-04-05 RX ORDER — METOCLOPRAMIDE HYDROCHLORIDE 5 MG/ML
5 INJECTION INTRAMUSCULAR; INTRAVENOUS EVERY 6 HOURS PRN
Status: CANCELLED | OUTPATIENT
Start: 2019-04-05

## 2019-04-05 RX ORDER — BISACODYL 10 MG
10 SUPPOSITORY, RECTAL RECTAL EVERY 12 HOURS PRN
Status: CANCELLED | OUTPATIENT
Start: 2019-04-05

## 2019-04-05 RX ORDER — FENTANYL CITRATE 50 UG/ML
50 INJECTION, SOLUTION INTRAMUSCULAR; INTRAVENOUS
Status: DISCONTINUED | OUTPATIENT
Start: 2019-04-07 | End: 2019-04-06

## 2019-04-05 RX ORDER — ONDANSETRON 2 MG/ML
INJECTION INTRAMUSCULAR; INTRAVENOUS
Status: DISCONTINUED | OUTPATIENT
Start: 2019-04-05 | End: 2019-04-05

## 2019-04-05 RX ADMIN — IPRATROPIUM BROMIDE AND ALBUTEROL SULFATE 3 ML: .5; 3 SOLUTION RESPIRATORY (INHALATION) at 11:04

## 2019-04-05 RX ADMIN — SODIUM CHLORIDE, SODIUM GLUCONATE, SODIUM ACETATE, POTASSIUM CHLORIDE, MAGNESIUM CHLORIDE, SODIUM PHOSPHATE, DIBASIC, AND POTASSIUM PHOSPHATE: .53; .5; .37; .037; .03; .012; .00082 INJECTION, SOLUTION INTRAVENOUS at 08:04

## 2019-04-05 RX ADMIN — MUPIROCIN 1 G: 20 OINTMENT TOPICAL at 08:04

## 2019-04-05 RX ADMIN — PROPOFOL 50 MCG/KG/MIN: 10 INJECTION, EMULSION INTRAVENOUS at 06:04

## 2019-04-05 RX ADMIN — CALCIUM CHLORIDE 0.4 G: 100 INJECTION, SOLUTION INTRAVENOUS at 09:04

## 2019-04-05 RX ADMIN — SODIUM CHLORIDE, SODIUM GLUCONATE, SODIUM ACETATE, POTASSIUM CHLORIDE, MAGNESIUM CHLORIDE, SODIUM PHOSPHATE, DIBASIC, AND POTASSIUM PHOSPHATE: .53; .5; .37; .037; .03; .012; .00082 INJECTION, SOLUTION INTRAVENOUS at 07:04

## 2019-04-05 RX ADMIN — ACETAMINOPHEN 650 MG: 650 SOLUTION ORAL at 11:04

## 2019-04-05 RX ADMIN — ROCURONIUM BROMIDE 30 MG: 10 INJECTION, SOLUTION INTRAVENOUS at 02:04

## 2019-04-05 RX ADMIN — FENTANYL CITRATE 100 MCG: 50 INJECTION, SOLUTION INTRAMUSCULAR; INTRAVENOUS at 02:04

## 2019-04-05 RX ADMIN — SODIUM CHLORIDE, SODIUM GLUCONATE, SODIUM ACETATE, POTASSIUM CHLORIDE, MAGNESIUM CHLORIDE, SODIUM PHOSPHATE, DIBASIC, AND POTASSIUM PHOSPHATE: .53; .5; .37; .037; .03; .012; .00082 INJECTION, SOLUTION INTRAVENOUS at 12:04

## 2019-04-05 RX ADMIN — ROCURONIUM BROMIDE 60 MG: 10 INJECTION, SOLUTION INTRAVENOUS at 07:04

## 2019-04-05 RX ADMIN — CEFAZOLIN 2 G: 330 INJECTION, POWDER, FOR SOLUTION INTRAMUSCULAR; INTRAVENOUS at 09:04

## 2019-04-05 RX ADMIN — KETAMINE HYDROCHLORIDE 10 MG: 10 INJECTION, SOLUTION INTRAMUSCULAR; INTRAVENOUS at 01:04

## 2019-04-05 RX ADMIN — KETAMINE HYDROCHLORIDE 10 MG: 10 INJECTION, SOLUTION INTRAMUSCULAR; INTRAVENOUS at 10:04

## 2019-04-05 RX ADMIN — ROCURONIUM BROMIDE 40 MG: 10 INJECTION, SOLUTION INTRAVENOUS at 10:04

## 2019-04-05 RX ADMIN — NEOSTIGMINE METHYLSULFATE 5 MG: 1 INJECTION INTRAVENOUS at 03:04

## 2019-04-05 RX ADMIN — NOREPINEPHRINE BITARTRATE 0.02 MG: 1 INJECTION, SOLUTION, CONCENTRATE INTRAVENOUS at 03:04

## 2019-04-05 RX ADMIN — FENTANYL CITRATE 25 MCG: 50 INJECTION INTRAMUSCULAR; INTRAVENOUS at 04:04

## 2019-04-05 RX ADMIN — IPRATROPIUM BROMIDE AND ALBUTEROL SULFATE 3 ML: .5; 3 SOLUTION RESPIRATORY (INHALATION) at 07:04

## 2019-04-05 RX ADMIN — LIDOCAINE HYDROCHLORIDE 100 MG: 20 INJECTION, SOLUTION INTRAVENOUS at 07:04

## 2019-04-05 RX ADMIN — NICARDIPINE HYDROCHLORIDE 7.5 MG/HR: 0.2 INJECTION, SOLUTION INTRAVENOUS at 10:04

## 2019-04-05 RX ADMIN — FENTANYL CITRATE 25 MCG: 50 INJECTION INTRAMUSCULAR; INTRAVENOUS at 10:04

## 2019-04-05 RX ADMIN — GLYCOPYRROLATE 0.6 MG: 0.2 INJECTION, SOLUTION INTRAMUSCULAR; INTRAVENOUS at 03:04

## 2019-04-05 RX ADMIN — PROPOFOL 50 MCG/KG/MIN: 10 INJECTION, EMULSION INTRAVENOUS at 02:04

## 2019-04-05 RX ADMIN — FENTANYL CITRATE 100 MCG: 50 INJECTION, SOLUTION INTRAMUSCULAR; INTRAVENOUS at 03:04

## 2019-04-05 RX ADMIN — PROTAMINE SULFATE 200 MG: 10 INJECTION, SOLUTION INTRAVENOUS at 11:04

## 2019-04-05 RX ADMIN — NOREPINEPHRINE BITARTRATE 0.02 MG: 1 INJECTION, SOLUTION, CONCENTRATE INTRAVENOUS at 07:04

## 2019-04-05 RX ADMIN — KETAMINE HYDROCHLORIDE 30 MG: 10 INJECTION, SOLUTION INTRAMUSCULAR; INTRAVENOUS at 07:04

## 2019-04-05 RX ADMIN — MAGNESIUM SULFATE IN WATER 2 G: 40 INJECTION, SOLUTION INTRAVENOUS at 11:04

## 2019-04-05 RX ADMIN — FENTANYL CITRATE 25 MCG: 50 INJECTION INTRAMUSCULAR; INTRAVENOUS at 07:04

## 2019-04-05 RX ADMIN — SODIUM CHLORIDE: 0.9 INJECTION, SOLUTION INTRAVENOUS at 05:04

## 2019-04-05 RX ADMIN — ALBUMIN (HUMAN): 12.5 SOLUTION INTRAVENOUS at 02:04

## 2019-04-05 RX ADMIN — MUPIROCIN 1 G: 20 OINTMENT TOPICAL at 06:04

## 2019-04-05 RX ADMIN — NICARDIPINE HYDROCHLORIDE 7.5 MG/HR: 0.2 INJECTION, SOLUTION INTRAVENOUS at 04:04

## 2019-04-05 RX ADMIN — CALCIUM CHLORIDE 0.3 G: 100 INJECTION, SOLUTION INTRAVENOUS at 01:04

## 2019-04-05 RX ADMIN — SODIUM CHLORIDE, SODIUM GLUCONATE, SODIUM ACETATE, POTASSIUM CHLORIDE, MAGNESIUM CHLORIDE, SODIUM PHOSPHATE, DIBASIC, AND POTASSIUM PHOSPHATE: .53; .5; .37; .037; .03; .012; .00082 INJECTION, SOLUTION INTRAVENOUS at 09:04

## 2019-04-05 RX ADMIN — CALCIUM CHLORIDE 0.2 G: 100 INJECTION, SOLUTION INTRAVENOUS at 09:04

## 2019-04-05 RX ADMIN — SODIUM CHLORIDE: 0.9 INJECTION, SOLUTION INTRAVENOUS at 06:04

## 2019-04-05 RX ADMIN — MIDAZOLAM HYDROCHLORIDE 1 MG: 1 INJECTION, SOLUTION INTRAMUSCULAR; INTRAVENOUS at 06:04

## 2019-04-05 RX ADMIN — TRANEXAMIC ACID 700 MG: 100 INJECTION, SOLUTION INTRAVENOUS at 08:04

## 2019-04-05 RX ADMIN — PROPOFOL 100 MG: 10 INJECTION, EMULSION INTRAVENOUS at 07:04

## 2019-04-05 RX ADMIN — EPINEPHRINE 20 MCG: 1 INJECTION, SOLUTION INTRAMUSCULAR; SUBCUTANEOUS at 07:04

## 2019-04-05 RX ADMIN — LIDOCAINE HYDROCHLORIDE 0.2 MG: 10 INJECTION, SOLUTION EPIDURAL; INFILTRATION; INTRACAUDAL; PERINEURAL at 05:04

## 2019-04-05 RX ADMIN — MIDAZOLAM HYDROCHLORIDE 3 MG: 1 INJECTION, SOLUTION INTRAMUSCULAR; INTRAVENOUS at 07:04

## 2019-04-05 RX ADMIN — NOREPINEPHRINE BITARTRATE 0.04 MCG/KG/MIN: 1 INJECTION, SOLUTION, CONCENTRATE INTRAVENOUS at 08:04

## 2019-04-05 RX ADMIN — EPINEPHRINE 0.08 MCG/KG/MIN: 1 INJECTION INTRAMUSCULAR; INTRAVENOUS; SUBCUTANEOUS at 11:04

## 2019-04-05 RX ADMIN — PANTOPRAZOLE SODIUM 40 MG: 40 INJECTION, POWDER, LYOPHILIZED, FOR SOLUTION INTRAVENOUS at 08:04

## 2019-04-05 RX ADMIN — LIDOCAINE HYDROCHLORIDE 100 MG: 20 INJECTION, SOLUTION INTRAVENOUS at 11:04

## 2019-04-05 RX ADMIN — DEXTROSE, SODIUM CHLORIDE, AND POTASSIUM CHLORIDE: 5; .45; .15 INJECTION INTRAVENOUS at 08:04

## 2019-04-05 RX ADMIN — IPRATROPIUM BROMIDE AND ALBUTEROL SULFATE 3 ML: .5; 3 SOLUTION RESPIRATORY (INHALATION) at 03:04

## 2019-04-05 RX ADMIN — FENTANYL CITRATE 250 MCG: 50 INJECTION, SOLUTION INTRAMUSCULAR; INTRAVENOUS at 07:04

## 2019-04-05 RX ADMIN — TRANEXAMIC ACID 1 MG/KG/HR: 100 INJECTION, SOLUTION INTRAVENOUS at 08:04

## 2019-04-05 RX ADMIN — HEPARIN SODIUM 21000 UNITS: 1000 INJECTION, SOLUTION INTRAVENOUS; SUBCUTANEOUS at 09:04

## 2019-04-05 RX ADMIN — CEFAZOLIN 2 G: 330 INJECTION, POWDER, FOR SOLUTION INTRAMUSCULAR; INTRAVENOUS at 08:04

## 2019-04-05 RX ADMIN — ASPIRIN 325 MG ORAL TABLET 325 MG: 325 PILL ORAL at 08:04

## 2019-04-05 RX ADMIN — CALCIUM CHLORIDE 0.4 G: 100 INJECTION, SOLUTION INTRAVENOUS at 01:04

## 2019-04-05 NOTE — PROGRESS NOTES
Received pt from OR intubated. ABG and EKG complete. See vent flowsheet for settings. Will continue to monitor.

## 2019-04-05 NOTE — ANESTHESIA PROCEDURE NOTES
Bilateral Transverse Thoracic Plane Block    Patient location during procedure: OR   Block not for primary anesthetic.  Reason for block: at surgeon's request and post-op pain management   Post-op Pain Location: Sternum  Start time: 4/5/2019 8:10 AM  Timeout: 4/5/2019 8:10 AM   End time: 4/5/2019 8:15 AM  Staffing  Anesthesiologist: Sridhar Wright MD  Resident/CRNA: Otilio Sarmiento MD  Performed: anesthesiologist and resident/CRNA   Preanesthetic Checklist  Completed: patient identified, site marked, surgical consent, pre-op evaluation, timeout performed, IV checked, risks and benefits discussed and monitors and equipment checked  Peripheral Block  Patient position: supine  Prep: ChloraPrep  Patient monitoring: heart rate, cardiac monitor, continuous pulse ox, continuous capnometry and frequent blood pressure checks  Block type: Transversus thoracis (transverse thoracic)  Laterality: bilateral  Injection technique: single shot  Needle  Needle type: Stimuplex   Needle gauge: 22 G  Needle length: 2 in  Needle localization: ultrasound guidance   -ultrasound image captured on disc.  Assessment  Injection assessment: negative aspiration, negative parasthesia and local visualized surrounding nerve  Paresthesia pain: none  Heart rate change: no  Slow fractionated injection: yes  Additional Notes  15cc of 0.5% bupivacaine injected bilaterally under ultrasound guidance.  No evidence of pneumothorax or intravascular injection

## 2019-04-05 NOTE — ANESTHESIA PROCEDURE NOTES
Arterial    Diagnosis: Aortic Regurgitation     Patient location during procedure: done in OR  Procedure start time: 4/5/2019 7:10 AM  Timeout: 4/5/2019 7:10 AM  Procedure end time: 4/5/2019 7:15 AM  Staffing  Anesthesiologist: Sridhar Wright MD  Resident/CRNA: Otilio Sarmiento MD  Performed: resident/CRNA   Anesthesiologist was present at the time of the procedure.  Preanesthetic Checklist  Completed: patient identified, site marked, surgical consent, pre-op evaluation, timeout performed, IV checked, risks and benefits discussed, monitors and equipment checked and anesthesia consent givenArterial  Skin Prep: chlorhexidine gluconate  Local Infiltration: lidocaine  Orientation: left  Location: radial  Catheter Size: 20 G  Catheter placement by Anatomical landmarks. Heme positive aspiration all ports.Insertion Attempts: 1  Assessment  Dressing: secured with tape and tegaderm  Patient: Tolerated well

## 2019-04-05 NOTE — BRIEF OP NOTE
[facility]  Cardiothoracic Surgery  Operative Note    SUMMARY     Date of Procedure: 4/5/2019     Procedure: Procedure(s) (LRB):  BENTALL PROCEDURE with a 27mm Medtronic Mosaic Ultra porcine bioprosthesis inside a 30 mm Gelweave Valsalva Dacron tube graft  52178    Surgeon(s) and Role:     * Yuri Burks MD - Primary     * Freddy Magallon MD - Resident - Assisting        Pre-Operative Diagnosis: Aortic valve insufficiency, etiology of cardiac valve disease unspecified [I35.1]    Post-Operative Diagnosis: Post-Op Diagnosis Codes:     * Aortic valve insufficiency, etiology of cardiac valve disease unspecified [I35.1]    Anesthesia: General        Description of the Findings of the Procedure: Bicuspid AV with L-R fusion. Severe AI. Dilated non-coronary sinus.         Complications: None    Estimated Blood Loss (EBL): 100 mL           Implants:   Implant Name Type Inv. Item Serial No.  Lot No. LRB No. Used   LEAD PACEMAKER MYOCARDIAL - [id number]  LEAD PACEMAKER MYOCARDIAL  [employer] [place] 0731A Left 1   LEAD PACEMAKER MYOCARDIAL - [id number]  LEAD PACEMAKER MYOCARDIAL  [employer] [place] 0731A  1   VALVE MOSAIC ULTRA AORTIC 27MM - [id number]  VALVE MOSAIC ULTRA AORTIC 27MM  [employer] [facility]  N/A 1   GRAFT VALSALVIA 30MM - [id number]  GRAFT VALSALVIA 30MM  [employer] [phone] N/A 1       Specimens:   Specimen (12h ago, onward)    Start     Ordered    04/05/19 1101  Specimen to Pathology - Surgery  Once     Comments:  1. aorta tissue -perm2 aortic valve leaflets perm     Start Status     04/05/19 1101 Collected (04/05/19 1104) Order ID: [id number]       04/05/19 1103                  Attestation:  I was present and scrubbed for the entire procedure. There was no qualified resident available to assist in this operation.

## 2019-04-05 NOTE — ANESTHESIA PROCEDURE NOTES
RAJWINDER    Diagnosis: nonrheumatic aortic valvular regurgitation I35.1  Patient location during procedure: OR  Procedure start time: 4/5/2019 7:41 AM  Procedure end time: 4/5/2019 2:10 PM  Exam type: Baseline  Staffing  Anesthesiologist: Sridhar Wright MD  Performed: anesthesiologist and fellow   Preanesthetic Checklist  Completed: patient identified, surgical consent, pre-op evaluation, timeout performed, risks and benefits discussed, monitors and equipment checked, anesthesia consent given, oxygen available, suction available, hand hygiene performed and patient being monitored  Setup & Induction  Patient preparation: bite block inserted  Probe Insertion: easy  Exam: complete      Findings  Impression  Other Findings  Bicuspid aortic valve with fusion of left and right leaflets, severe AI with eccentric regurgitant jet  Dilated left ventricle with globally mild hypokinesis, EF 35-40% quantitatively calculated  Ascending aortic enlargement with normal arch and descending measurements      Probe Removal      Exam     Left Heart  Left Atruim: dilated and moderately abnormal (men 4.7-5.2; women 4.3-4.6)    Left Ventricle: cm, moderately abnormal (men 6.4-6.8; women 5.7-6.1)    LVAD:no  Estimated Ejection Fraction: 35-44% moderate  Regional Wall Abnormalities: no RWMA            Right Heart  Right Ventricle: normal  Right Ventricle Function: normal  Right Atria: cm and normal    Intra Atrial Septum  PFO: no shunt by color flow doppler  no IAS aneurysm  no lipomatous hypertrophy  no Atrial Septal Defect (Asd)    Right Ventricle  Size: normal, Free Wall Thickness: normal    Aortic Valve:  Stenosis: none  Morphology: bicuspid aortic valve  LVOT: 2.7 cm  Annulus Diameter: 2.7 cm  STJ Diameter: 3.6 cm  AV VTI: 47.8 cm  AV Vmax: 215 cm/s2  AV Mean Gradient: 8 mmHg  AV Peak Gradient: 18 mmHg  Regurgitation: severe (4+) aortic regurgitation, reversal of flow in descending thoracic aorta AV Vena contracta AV Jet Height to LVOT  diameter ratio     Mitral Valve:  Morphology:normal  Prolapse: none  Flail: no flail  Jet Description: mildStenosis: no significant stenosis.    Tricuspid Valve:  Morphology: normal  Regurgitation: none    Pulmonic Valve:  Morphology:normal  Regurgitation(color flow): none    Great Vessels  Ascending Aorta Diameter: 3.73 cm   Ascending Aorta Atherosclerosis: 1=nl-min dzAortic Arch Diameter: 2.62 cm cm.  Aortic Arch Atherosclerosis: 1=nl-min dzDescending Aorta Diameter: 2.34 cm  IABP: no  Descending Aorta Atherosclerosis: 1=nl-min dz  Aorta    Descending aorta IABP: no    Effusions  Effusions: none    Summary  Findings discussed with surgeon.    Other Findings   Bicuspid aortic valve with fusion of left and right leaflets, severe AI with eccentric regurgitant jet  Dilated left ventricle with globally mild hypokinesis, EF 35-40% quantitatively calculated  Ascending aortic enlargement with normal arch and descending measurements

## 2019-04-05 NOTE — PLAN OF CARE
04/05/19 1657   Discharge Assessment   Assessment Type Discharge Planning Assessment   Confirmed/corrected address and phone number on facesheet? Yes   Assessment information obtained from? Medical Record   Expected Length of Stay (days) 5   Communicated expected length of stay with patient/caregiver yes   Prior to hospitilization cognitive status: Alert/Oriented   Prior to hospitalization functional status: Independent   Current cognitive status: Alert/Oriented   Current Functional Status: Assistive Equipment   Facility Arrived From: home for planned surgery   Lives With spouse   Able to Return to Prior Arrangements yes   Is patient able to care for self after discharge? No  (pt will need assistance with driving, sternal precautions)   Who are your caregiver(s) and their phone number(s)? spouse Meenakshi Lopez [phone]   Patient's perception of discharge disposition home or selfcare   Readmission Within the Last 30 Days no previous admission in last 30 days   Patient currently being followed by outpatient case management? No   Patient currently receives any other outside agency services? No   Equipment Currently Used at Home none   Do you have any problems affording any of your prescribed medications? No   Is the patient taking medications as prescribed? yes   Does the patient have transportation home? Yes   Transportation Anticipated family or friend will provide   Does the patient receive services at the [facility]? No   Discharge Plan A Home with family   Discharge Plan B Home with family;Home Health   DME Needed Upon Discharge  other (see comments)  (TBD)   Patient/Family in Agreement with Plan unable to assess     Pt POD 0. Will follow up with patient upon step down from ICU or stable on the unit.

## 2019-04-05 NOTE — ANESTHESIA PROCEDURE NOTES
Final RAJWINDER    Diagnosis: aortic regurgitation  Patient location during procedure: OR  Procedure start time: 4/5/2019 2:00 PM  Procedure end time: 4/5/2019 2:20 PM  Staffing  Anesthesiologist: Sridhar Wright MD  Performed: anesthesiologist   Preanesthetic Checklist  Completed: patient identified, surgical consent, pre-op evaluation, timeout performed, risks and benefits discussed, monitors and equipment checked, anesthesia consent given, oxygen available, suction available, hand hygiene performed and patient being monitored  Setup & Induction  Patient preparation: bite block inserted  Probe Insertion: easy      Findings  Bioprosthetic valve in aortic position, no central or perivalvular regurgitation, no abnormal motion or rocking  Graft in ascending aortic position  No aortic dissection, preserved biventricular function  Impression  Other Findings    Probe Removal     RAJWINDER probe removed without event.No blood on removal of probe.

## 2019-04-05 NOTE — SUBJECTIVE & OBJECTIVE
Interval HPI:   Overnight events:    NAEON. Sedated and intubated in SICU. BG is within goal on intensive IV insulin infusion.     Eating:   NPO  Nausea: No  Hypoglycemia and intervention: No  Fever: No  TPN and/or TF: No  If yes, type of TF/TPN and rate: None    PMH, PSH, FH, SH reviewed     ROS:  Unable to obtain due to: Sedation,Intubation,Altered mental status,Critical illness,Reviewed ROS from note dated 04/05/2019 per Freddy Magallon MD    Current Medications and/or Treatments Impacting Glycemic Control  Immunotherapy:    Immunosuppressants     None        Steroids:   Hormones (From admission, onward)    None        Pressors:    Autonomic Drugs (From admission, onward)    Start     Stop Route Frequency Ordered    04/05/19 1530  EPINEPHrine (ADRENALIN) 5 mg in sodium chloride 0.9% 250 mL infusion     Question Answer Comment   Titrate by: (in mcg/kg/min) 0.02    Titrate interval: (in minutes) 5    Titrate to maintain: (SBP or MAP or Cardiac Index) MAP    Greater than: (in mmHg) 65    Cardiac index greater than: (in L/min) 2.2    Maximum dose: (in mcg/kg/min) 2        -- IV Continuous 04/05/19 1517        Hyperglycemia/Diabetes Medications:   Antihyperglycemics (From admission, onward)    Start     Stop Route Frequency Ordered    04/05/19 1530  insulin regular (Humulin R) 100 Units in sodium chloride 0.9% 100 mL infusion     Question:  Insulin rate changes (DO NOT MODIFY ANSWER)  Answer:  [email or web address]    -- IV Continuous 04/05/19 1517             PHYSICAL EXAMINATION:  Vitals:    04/05/19 1545   BP:    Pulse: 84   Resp: 18   Temp:      Body mass index is 20.45 kg/m².    Physical Exam   Constitutional: Well developed, well nourished, NAD. Sedated.  ENT: External ears no masses with nose patent  Neck: Supple; trachea midline; no thyromegaly.  Cardiovascular: Heart Sounds present. No LE edema.   Lungs: lungs anterior bilaterally clear to auscultation.  Intubated on ventilator.  Abdomen: Soft, no masses, no hernias.  MS: No clubbing or cyanosis of nails noted unable to assess gait.  Skin: No rashes, lesions, or ulcers; no nodules. Mid-sternal incision with dressing; chest tubes x2.   Foot: nails in good condition, no amputations noted.

## 2019-04-05 NOTE — ANESTHESIA PROCEDURE NOTES
Zarephath Marcia Line    Diagnosis: Aortic Regurgitation   Patient location during procedure: done in OR  Procedure start time: 4/5/2019 7:45 AM  Timeout: 4/5/2019 7:45 AM  Procedure end time: 4/5/2019 8:10 AM  Staffing  Anesthesiologist: Sridhar Wright MD  Resident/CRNA: Otilio Sarmiento MD  Performed: resident/CRNA   Anesthesiologist was present at the time of the procedure.  Preanesthetic Checklist  Completed: patient identified, site marked, surgical consent, pre-op evaluation, timeout performed, IV checked, risks and benefits discussed, monitors and equipment checked and anesthesia consent given  Zarephath Marcia Line  Skin Prep: chlorhexidine gluconate  Local Infiltration: none  Location: right,  internal jugular vein  Introducer: 9.5 Fr, manometry used.  Device: CCO/Oximetric Catheter  Catheter Size: 9 Fr  Catheter placement by no (Blind Stick). Heme positive aspiration all ports. PAC floated with balloon up until wedgedSterile sheath usedInsertion Attempts: 1  Indication: intravenous therapy, hemodynamic monitoring  Assessment  Central Line Bundle Protocol followed. Hand hygiene before procedure, surgical cap worn, surgical mask worn, sterile surgical gloves worn, large sterile drape used.  Verification: blood return  Dressing: secured with tape and tegaderm and sutured in place and taped  Patient: Tolerated Well

## 2019-04-05 NOTE — INTERVAL H&P NOTE
The patient has been examined and the H&P has been reviewed:    I concur with the findings and no changes have occurred since H&P was written.    Anesthesia/Surgery risks, benefits and alternative options discussed and understood by patient/family.          Active Hospital Problems    Diagnosis  POA    Nonrheumatic aortic valve insufficiency [I35.1]  Yes      Resolved Hospital Problems   No resolved problems to display.

## 2019-04-05 NOTE — OP NOTE
DATE OF PROCEDURE:  04/05/2019    ATTENDING SURGEON:  Yrui Burks M.D.    PREOPERATIVE DIAGNOSES:  1.  Severe aortic insufficiency.  2.  New York Heart Association class II heart failure.  3.  Dilated left ventricle.  4.  Chronic systolic heart failure.  5.  Mild mitral regurgitation.  6.  Hypertension.    POSTOPERATIVE DIAGNOSES:  1.  Severe aortic insufficiency.  2.  New York Heart Association class II heart failure.  3.  Dilated left ventricle.  4.  Chronic systolic heart failure.  5.  Mild mitral regurgitation.  6.  Hypertension.    OPERATION PERFORMED:  Aortic root replacement (Bentall procedure) with a 27-mm   Medtronic Mosaic ultra porcine bioprosthesis inside a 30 mm Gelweave Valsalva   Dacron tube graft.    ANESTHESIA:  General endotracheal.    ESTIMATED BLOOD LOSS:  100 mL.    BRIEF HISTORY:  Mr. Lopez is a 59-year-old gentleman who has been followed for   aortic insufficiency.  He had initially had a decreased left ventricular   ejection fraction, but improved with medical management.  Unfortunately, over   time, he demonstrated progressive left ventricular dilation.  He now presents   for aortic root replacement.  As part of his preoperative evaluation, he   underwent CT scanning, which demonstrated an aortic root that was 4.3 cm in   maximal diameter.  Given his young age and the lack of other comorbid   conditions, root replacement was recommended.    DESCRIPTION OF PROCEDURE:  After obtaining informed and written consent, the   patient was brought to the Operating Room and placed on the operating room table   in supine position.  After induction of adequate general endotracheal   anesthesia, the patient's chest, abdomen, pelvis and bilateral lower extremities   were prepped and draped in usual sterile fashion.  An upper midline skin   incision was made, and a median sternotomy was performed.  A pericardial well   was created.  The patient was systemically heparinized.  Cannulation  sutures   were placed in the proximal aortic arch and in the right atrial appendage.  The   aortic cannula was inserted, followed by the venous.  A retrograde cardioplegic   catheter was placed.  The patient was then put on cardiopulmonary bypass.  Once   on bypass, a left ventricular vent was placed through the right superior   pulmonary vein.  The aorta was  from pulmonary artery.  The aortic   crossclamp was applied, and the heart was arrested using cold blood enhanced   retrograde cardioplegia.  Once the aortic root had decompressed, a prompt   aortotomy was made.  Handheld cardioplegia was administered down the ostium of   the right coronary.  Topical cooling was also utilized.  Once the cardioplegia   was all in, the aorta was transected at the level of the right pulmonary artery.    It was resected distally to within 1 cm of the clamp, which had been placed   just proximal to the cannula and therefore the origin of the innominate artery.    Proximally, it was resected to the level of the sinotubular junction.  Silk   sutures were placed at all three commissures, and the aortic valve was   evaluated.  It was congenitally bicuspid, with fusion between the left and right   cusps.  The left main ostium was identified and marked with a silk suture.  A   Carrel patch was then cut out.  The same was done for the right coronary.  The   aortic valve leaflets were then resected.  The annulus was sized, and a 27 mm   valve was selected, as dictated a 30 mm tube graft.  While the valve and tube   graft were being retrieved, multiple pledgeted 2-0 Ethibond sutures were placed   circumferentially around the annulus.  Once the sutures were all in, the valved   conduit was fashioned by securing the porcine bioprosthesis in the base of the   Valsalva graft using a running 4-0 Prolene suture.  The valve conduit was then   brought up, and the annular sutures were passed through the sewing ring.  The   valve conduit was  slid into position.  It seated nicely.  The sutures were tied   and then cut.  A site suitable for reimplantation of the left main was   identified and marked.  A defect was created in the Dacron tube graft using the   Eye cautery.  The left main button was then reimplanted using a running 5-0   Prolene suture.  After reimplantation, a dose of cardioplegia was given   retrograde.  Good effluent was seen to come from the ostium of the left main.    The distal portion of the tube graft was tailored, and a graft to aorta   anastomosis was performed using a running 4-0 Prolene suture reinforced with a   felt strip.  A site suitable for reimplantation of the right coronary was   identified and marked.  A defect was created in the Dacron tube graft, in the   Valsalva portion, using the Eye cautery.  The right coronary button was then   reimplanted using a running 5-0 Prolene.  An aortic root vent was then placed   into the Dacron tube graft.  The hot shot was given retrograde, and de-airing   maneuvers were performed.  The aortic cross-clamp was removed.  The patient was   subsequently weaned from cardiopulmonary bypass.  Once off bypass, all surgical   sites were inspected.  There was good surgical hemostasis.  The test dose of   protamine was administered, and this was well tolerated.  The total dose was   then given.  group home through the total dose, all cannulas were removed.  All   surgical sites were again inspected, again there was good hemostasis from a   surgical standpoint.  However, there continued to be diffuse coagulopathic   bleeding.  This may have been due to the patient's preoperative thrombocytopenia   as well as a low fibrinogen level.  The patient was given platelets,   cryoprecipitate and fresh frozen plasma.  The aortic root was repeatedly packed   with sponges.  Eventually, we achieved acceptable hemostasis, although it took   quite a bit of time.  Two drains were placed in the mediastinum.   Ventricular   pacing wires were placed.  After again confirming that there was no surgical   bleeding, and that the hemostasis was likely the best we would achieve while   remaining on the Operating Room, the chest was closed using eight #6 stainless   steel wires to reapproximate the sternum.  The overlying soft tissues were   reapproximated using absorbable suture material.  The patient's chest was washed   and dried, and a dry dressing was applied.  The patient tolerated the procedure   well, there were no complications.  At the conclusion of the case, sponge and   instrument counts were correct.  There was no qualified resident available to   assist with this operation.      PP/IN  dd: 04/05/2019 14:45:54 (CDT)  td: 04/05/2019 15:12:15 (CDT)  Doc ID   #[id number]  Job ID #[id number]    CC:     [id number]

## 2019-04-05 NOTE — TRANSFER OF CARE
"Anesthesia Transfer of Care Note    Patient: Devin Lopez    Procedure(s) Performed: Procedure(s) (LRB):  BENTALL PROCEDURE (N/A)    Patient location: ICU    Anesthesia Type: general    Transport from OR: Transported from OR intubated on 100% O2 by AMBU with assisted ventilation    Post pain: adequate analgesia    Post assessment: no apparent anesthetic complications and tolerated procedure well    Post vital signs: stable    Level of consciousness: awake, alert and oriented    Nausea/Vomiting: no nausea/vomiting    Complications: none          Last vitals:   Visit Vitals  BP (!) 149/65 (BP Location: Left arm, Patient Position: Sitting)   Pulse 82   Temp 36.7 °C (98.1 °F) (Oral)   Resp 20   Ht 5' 11" (1.803 m)   Wt 66.5 kg (146 lb 9.7 oz)   SpO2 100%   BMI 20.45 kg/m²     "

## 2019-04-05 NOTE — HPI
Reason for Consult: Management of Hyperglycemia     Surgical Procedure and Date: 04/05/2019 - Bentall Procedure.    HPI:   Patient is a 59 y.o. male with a diagnosis of HTN and AI. Is now S/P Bentall Procedure. No history of DM noted on file. Endocrinology consulted to manage hyperglycemia s/p cardiac procedure.     Lab Results   Component Value Date    HGBA1C 5.5 04/04/2019

## 2019-04-06 LAB
ALLENS TEST: ABNORMAL
ANION GAP SERPL CALC-SCNC: 7 MMOL/L (ref 8–16)
ANION GAP SERPL CALC-SCNC: 8 MMOL/L (ref 8–16)
APTT BLDCRRT: 26 SEC (ref 21–32)
APTT BLDCRRT: 26 SEC (ref 21–32)
BASOPHILS # BLD AUTO: 0 K/UL (ref 0–0.2)
BASOPHILS # BLD AUTO: 0 K/UL (ref 0–0.2)
BASOPHILS # BLD AUTO: 0.01 K/UL (ref 0–0.2)
BASOPHILS NFR BLD: 0 % (ref 0–1.9)
BASOPHILS NFR BLD: 0 % (ref 0–1.9)
BASOPHILS NFR BLD: 0.1 % (ref 0–1.9)
BUN SERPL-MCNC: 13 MG/DL (ref 6–20)
BUN SERPL-MCNC: 13 MG/DL (ref 6–20)
BUN SERPL-MCNC: 15 MG/DL (ref 6–20)
BUN SERPL-MCNC: 16 MG/DL (ref 6–20)
CALCIUM SERPL-MCNC: 8.1 MG/DL (ref 8.7–10.5)
CALCIUM SERPL-MCNC: 8.1 MG/DL (ref 8.7–10.5)
CALCIUM SERPL-MCNC: 8.7 MG/DL (ref 8.7–10.5)
CALCIUM SERPL-MCNC: 8.7 MG/DL (ref 8.7–10.5)
CHLORIDE SERPL-SCNC: 107 MMOL/L (ref 95–110)
CHLORIDE SERPL-SCNC: 109 MMOL/L (ref 95–110)
CO2 SERPL-SCNC: 24 MMOL/L (ref 23–29)
CO2 SERPL-SCNC: 25 MMOL/L (ref 23–29)
CO2 SERPL-SCNC: 28 MMOL/L (ref 23–29)
CO2 SERPL-SCNC: 28 MMOL/L (ref 23–29)
CREAT SERPL-MCNC: 0.9 MG/DL (ref 0.5–1.4)
CREAT SERPL-MCNC: 1 MG/DL (ref 0.5–1.4)
CREAT SERPL-MCNC: 1.1 MG/DL (ref 0.5–1.4)
CREAT SERPL-MCNC: 1.1 MG/DL (ref 0.5–1.4)
DELSYS: ABNORMAL
DIFFERENTIAL METHOD: ABNORMAL
EOSINOPHIL # BLD AUTO: 0 K/UL (ref 0–0.5)
EOSINOPHIL NFR BLD: 0 % (ref 0–8)
ERYTHROCYTE [DISTWIDTH] IN BLOOD BY AUTOMATED COUNT: 13.8 % (ref 11.5–14.5)
ERYTHROCYTE [DISTWIDTH] IN BLOOD BY AUTOMATED COUNT: 13.8 % (ref 11.5–14.5)
ERYTHROCYTE [DISTWIDTH] IN BLOOD BY AUTOMATED COUNT: 14 % (ref 11.5–14.5)
ERYTHROCYTE [SEDIMENTATION RATE] IN BLOOD BY WESTERGREN METHOD: 14 MM/H
ERYTHROCYTE [SEDIMENTATION RATE] IN BLOOD BY WESTERGREN METHOD: 14 MM/H
EST. GFR  (AFRICAN AMERICAN): >60 ML/MIN/1.73 M^2
EST. GFR  (NON AFRICAN AMERICAN): >60 ML/MIN/1.73 M^2
FIO2: 40
GLUCOSE SERPL-MCNC: 113 MG/DL (ref 70–110)
GLUCOSE SERPL-MCNC: 113 MG/DL (ref 70–110)
GLUCOSE SERPL-MCNC: 121 MG/DL (ref 70–110)
GLUCOSE SERPL-MCNC: 132 MG/DL (ref 70–110)
HCO3 UR-SCNC: 22.9 MMOL/L (ref 24–28)
HCO3 UR-SCNC: 28.3 MMOL/L (ref 24–28)
HCO3 UR-SCNC: 28.4 MMOL/L (ref 24–28)
HCT VFR BLD AUTO: 26.3 % (ref 40–54)
HCT VFR BLD AUTO: 26.3 % (ref 40–54)
HCT VFR BLD AUTO: 27.4 % (ref 40–54)
HGB BLD-MCNC: 8.9 G/DL (ref 14–18)
HGB BLD-MCNC: 8.9 G/DL (ref 14–18)
HGB BLD-MCNC: 9.3 G/DL (ref 14–18)
IMM GRANULOCYTES # BLD AUTO: 0.02 K/UL (ref 0–0.04)
IMM GRANULOCYTES # BLD AUTO: 0.02 K/UL (ref 0–0.04)
IMM GRANULOCYTES # BLD AUTO: 0.04 K/UL (ref 0–0.04)
IMM GRANULOCYTES NFR BLD AUTO: 0.3 % (ref 0–0.5)
IMM GRANULOCYTES NFR BLD AUTO: 0.3 % (ref 0–0.5)
IMM GRANULOCYTES NFR BLD AUTO: 0.5 % (ref 0–0.5)
INR PPP: 1 (ref 0.8–1.2)
INR PPP: 1 (ref 0.8–1.2)
LYMPHOCYTES # BLD AUTO: 0.6 K/UL (ref 1–4.8)
LYMPHOCYTES # BLD AUTO: 0.8 K/UL (ref 1–4.8)
LYMPHOCYTES # BLD AUTO: 0.8 K/UL (ref 1–4.8)
LYMPHOCYTES NFR BLD: 12.7 % (ref 18–48)
LYMPHOCYTES NFR BLD: 12.7 % (ref 18–48)
LYMPHOCYTES NFR BLD: 7 % (ref 18–48)
MAGNESIUM SERPL-MCNC: 2.2 MG/DL (ref 1.6–2.6)
MAGNESIUM SERPL-MCNC: 2.2 MG/DL (ref 1.6–2.6)
MAGNESIUM SERPL-MCNC: 2.4 MG/DL (ref 1.6–2.6)
MAGNESIUM SERPL-MCNC: 2.4 MG/DL (ref 1.6–2.6)
MCH RBC QN AUTO: 32.1 PG (ref 27–31)
MCHC RBC AUTO-ENTMCNC: 33.8 G/DL (ref 32–36)
MCHC RBC AUTO-ENTMCNC: 33.8 G/DL (ref 32–36)
MCHC RBC AUTO-ENTMCNC: 33.9 G/DL (ref 32–36)
MCV RBC AUTO: 95 FL (ref 82–98)
MODE: ABNORMAL
MONOCYTES # BLD AUTO: 0.6 K/UL (ref 0.3–1)
MONOCYTES # BLD AUTO: 0.6 K/UL (ref 0.3–1)
MONOCYTES # BLD AUTO: 0.9 K/UL (ref 0.3–1)
MONOCYTES NFR BLD: 10.3 % (ref 4–15)
MONOCYTES NFR BLD: 9.6 % (ref 4–15)
MONOCYTES NFR BLD: 9.6 % (ref 4–15)
NEUTROPHILS # BLD AUTO: 4.6 K/UL (ref 1.8–7.7)
NEUTROPHILS # BLD AUTO: 4.6 K/UL (ref 1.8–7.7)
NEUTROPHILS # BLD AUTO: 7 K/UL (ref 1.8–7.7)
NEUTROPHILS NFR BLD: 77.4 % (ref 38–73)
NEUTROPHILS NFR BLD: 77.4 % (ref 38–73)
NEUTROPHILS NFR BLD: 82.1 % (ref 38–73)
NRBC BLD-RTO: 0 /100 WBC
PCO2 BLDA: 33.5 MMHG (ref 35–45)
PCO2 BLDA: 44.2 MMHG (ref 35–45)
PCO2 BLDA: 46.2 MMHG (ref 35–45)
PEEP: 5
PH SMN: 7.4 [PH] (ref 7.35–7.45)
PH SMN: 7.42 [PH] (ref 7.35–7.45)
PH SMN: 7.44 [PH] (ref 7.35–7.45)
PHOSPHATE SERPL-MCNC: 2.1 MG/DL (ref 2.7–4.5)
PHOSPHATE SERPL-MCNC: 2.4 MG/DL (ref 2.7–4.5)
PHOSPHATE SERPL-MCNC: 3.5 MG/DL (ref 2.7–4.5)
PHOSPHATE SERPL-MCNC: 3.5 MG/DL (ref 2.7–4.5)
PLATELET # BLD AUTO: 73 K/UL (ref 150–350)
PLATELET # BLD AUTO: 73 K/UL (ref 150–350)
PLATELET # BLD AUTO: 77 K/UL (ref 150–350)
PMV BLD AUTO: 10.1 FL (ref 9.2–12.9)
PMV BLD AUTO: 10.1 FL (ref 9.2–12.9)
PMV BLD AUTO: 10.5 FL (ref 9.2–12.9)
PO2 BLDA: 112 MMHG (ref 80–100)
PO2 BLDA: 130 MMHG (ref 80–100)
PO2 BLDA: 41 MMHG (ref 40–60)
POC BE: -1 MMOL/L
POC BE: 4 MMOL/L
POC BE: 4 MMOL/L
POC SATURATED O2: 75 % (ref 95–100)
POC SATURATED O2: 98 % (ref 95–100)
POC SATURATED O2: 99 % (ref 95–100)
POC TCO2: 24 MMOL/L (ref 23–27)
POC TCO2: 30 MMOL/L (ref 23–27)
POC TCO2: 30 MMOL/L (ref 24–29)
POCT GLUCOSE: 103 MG/DL (ref 70–110)
POCT GLUCOSE: 108 MG/DL (ref 70–110)
POCT GLUCOSE: 108 MG/DL (ref 70–110)
POCT GLUCOSE: 111 MG/DL (ref 70–110)
POCT GLUCOSE: 111 MG/DL (ref 70–110)
POCT GLUCOSE: 112 MG/DL (ref 70–110)
POCT GLUCOSE: 118 MG/DL (ref 70–110)
POCT GLUCOSE: 120 MG/DL (ref 70–110)
POCT GLUCOSE: 121 MG/DL (ref 70–110)
POCT GLUCOSE: 127 MG/DL (ref 70–110)
POCT GLUCOSE: 134 MG/DL (ref 70–110)
POCT GLUCOSE: 147 MG/DL (ref 70–110)
POTASSIUM SERPL-SCNC: 4.1 MMOL/L (ref 3.5–5.1)
POTASSIUM SERPL-SCNC: 4.1 MMOL/L (ref 3.5–5.1)
POTASSIUM SERPL-SCNC: 4.4 MMOL/L (ref 3.5–5.1)
PROTHROMBIN TIME: 10.7 SEC (ref 9–12.5)
PROTHROMBIN TIME: 10.7 SEC (ref 9–12.5)
PS: 5
RBC # BLD AUTO: 2.77 M/UL (ref 4.6–6.2)
RBC # BLD AUTO: 2.77 M/UL (ref 4.6–6.2)
RBC # BLD AUTO: 2.9 M/UL (ref 4.6–6.2)
SAMPLE: ABNORMAL
SITE: ABNORMAL
SODIUM SERPL-SCNC: 139 MMOL/L (ref 136–145)
SODIUM SERPL-SCNC: 141 MMOL/L (ref 136–145)
SODIUM SERPL-SCNC: 142 MMOL/L (ref 136–145)
SODIUM SERPL-SCNC: 142 MMOL/L (ref 136–145)
SP02: 100
SPONT RATE: 18
VT: 400
VT: 400
WBC # BLD AUTO: 5.91 K/UL (ref 3.9–12.7)
WBC # BLD AUTO: 5.91 K/UL (ref 3.9–12.7)
WBC # BLD AUTO: 8.52 K/UL (ref 3.9–12.7)

## 2019-04-06 PROCEDURE — 80048 BASIC METABOLIC PNL TOTAL CA: CPT

## 2019-04-06 PROCEDURE — 85610 PROTHROMBIN TIME: CPT

## 2019-04-06 PROCEDURE — 93010 EKG 12-LEAD: ICD-10-PCS | Mod: ,,, | Performed by: INTERNAL MEDICINE

## 2019-04-06 PROCEDURE — 93005 ELECTROCARDIOGRAM TRACING: CPT

## 2019-04-06 PROCEDURE — 83735 ASSAY OF MAGNESIUM: CPT | Mod: 91

## 2019-04-06 PROCEDURE — 84132 ASSAY OF SERUM POTASSIUM: CPT

## 2019-04-06 PROCEDURE — 80048 BASIC METABOLIC PNL TOTAL CA: CPT | Mod: 91

## 2019-04-06 PROCEDURE — 84100 ASSAY OF PHOSPHORUS: CPT | Mod: 91

## 2019-04-06 PROCEDURE — 93010 ELECTROCARDIOGRAM REPORT: CPT | Mod: ,,, | Performed by: INTERNAL MEDICINE

## 2019-04-06 PROCEDURE — 99900017 HC EXTUBATION W/PARAMETERS (STAT)

## 2019-04-06 PROCEDURE — C9113 INJ PANTOPRAZOLE SODIUM, VIA: HCPCS | Performed by: STUDENT IN AN ORGANIZED HEALTH CARE EDUCATION/TRAINING PROGRAM

## 2019-04-06 PROCEDURE — 99900026 HC AIRWAY MAINTENANCE (STAT)

## 2019-04-06 PROCEDURE — 63600175 PHARM REV CODE 636 W HCPCS: Performed by: STUDENT IN AN ORGANIZED HEALTH CARE EDUCATION/TRAINING PROGRAM

## 2019-04-06 PROCEDURE — 94761 N-INVAS EAR/PLS OXIMETRY MLT: CPT

## 2019-04-06 PROCEDURE — 27000221 HC OXYGEN, UP TO 24 HOURS

## 2019-04-06 PROCEDURE — 25000003 PHARM REV CODE 250: Performed by: STUDENT IN AN ORGANIZED HEALTH CARE EDUCATION/TRAINING PROGRAM

## 2019-04-06 PROCEDURE — 82803 BLOOD GASES ANY COMBINATION: CPT

## 2019-04-06 PROCEDURE — 99900035 HC TECH TIME PER 15 MIN (STAT)

## 2019-04-06 PROCEDURE — 94799 UNLISTED PULMONARY SVC/PX: CPT

## 2019-04-06 PROCEDURE — 83735 ASSAY OF MAGNESIUM: CPT

## 2019-04-06 PROCEDURE — 20000000 HC ICU ROOM

## 2019-04-06 PROCEDURE — 85730 THROMBOPLASTIN TIME PARTIAL: CPT

## 2019-04-06 PROCEDURE — 84100 ASSAY OF PHOSPHORUS: CPT

## 2019-04-06 PROCEDURE — 25000242 PHARM REV CODE 250 ALT 637 W/ HCPCS: Performed by: STUDENT IN AN ORGANIZED HEALTH CARE EDUCATION/TRAINING PROGRAM

## 2019-04-06 PROCEDURE — 37799 UNLISTED PX VASCULAR SURGERY: CPT

## 2019-04-06 PROCEDURE — 94640 AIRWAY INHALATION TREATMENT: CPT

## 2019-04-06 PROCEDURE — 85025 COMPLETE CBC W/AUTO DIFF WBC: CPT | Mod: 91

## 2019-04-06 RX ORDER — INSULIN ASPART 100 [IU]/ML
0-5 INJECTION, SOLUTION INTRAVENOUS; SUBCUTANEOUS EVERY 6 HOURS PRN
Status: DISCONTINUED | OUTPATIENT
Start: 2019-04-06 | End: 2019-04-08

## 2019-04-06 RX ORDER — HYDRALAZINE HYDROCHLORIDE 20 MG/ML
10 INJECTION INTRAMUSCULAR; INTRAVENOUS EVERY 4 HOURS PRN
Status: DISCONTINUED | OUTPATIENT
Start: 2019-04-06 | End: 2019-04-10

## 2019-04-06 RX ORDER — ATORVASTATIN CALCIUM 10 MG/1
10 TABLET, FILM COATED ORAL DAILY
Status: DISCONTINUED | OUTPATIENT
Start: 2019-04-06 | End: 2019-04-07

## 2019-04-06 RX ORDER — CARVEDILOL 3.12 MG/1
3.12 TABLET ORAL 2 TIMES DAILY
Status: DISCONTINUED | OUTPATIENT
Start: 2019-04-06 | End: 2019-04-07

## 2019-04-06 RX ORDER — FUROSEMIDE 10 MG/ML
40 INJECTION INTRAMUSCULAR; INTRAVENOUS 3 TIMES DAILY
Status: DISCONTINUED | OUTPATIENT
Start: 2019-04-06 | End: 2019-04-10

## 2019-04-06 RX ORDER — GLUCAGON 1 MG
1 KIT INJECTION
Status: DISCONTINUED | OUTPATIENT
Start: 2019-04-06 | End: 2019-04-08

## 2019-04-06 RX ADMIN — CARVEDILOL 3.12 MG: 3.12 TABLET, FILM COATED ORAL at 09:04

## 2019-04-06 RX ADMIN — FUROSEMIDE 40 MG: 10 INJECTION, SOLUTION INTRAMUSCULAR; INTRAVENOUS at 09:04

## 2019-04-06 RX ADMIN — MUPIROCIN 1 G: 20 OINTMENT TOPICAL at 08:04

## 2019-04-06 RX ADMIN — FENTANYL CITRATE 25 MCG: 50 INJECTION INTRAMUSCULAR; INTRAVENOUS at 05:04

## 2019-04-06 RX ADMIN — POTASSIUM CHLORIDE 20 MEQ: 200 INJECTION, SOLUTION INTRAVENOUS at 01:04

## 2019-04-06 RX ADMIN — NICARDIPINE HYDROCHLORIDE 2.5 MG/HR: 0.2 INJECTION, SOLUTION INTRAVENOUS at 08:04

## 2019-04-06 RX ADMIN — IPRATROPIUM BROMIDE AND ALBUTEROL SULFATE 3 ML: .5; 3 SOLUTION RESPIRATORY (INHALATION) at 07:04

## 2019-04-06 RX ADMIN — CEFAZOLIN 2 G: 330 INJECTION, POWDER, FOR SOLUTION INTRAMUSCULAR; INTRAVENOUS at 04:04

## 2019-04-06 RX ADMIN — PROPOFOL 35 MCG/KG/MIN: 10 INJECTION, EMULSION INTRAVENOUS at 12:04

## 2019-04-06 RX ADMIN — DOCUSATE SODIUM 100 MG: 100 CAPSULE, LIQUID FILLED ORAL at 08:04

## 2019-04-06 RX ADMIN — CEFAZOLIN 2 G: 330 INJECTION, POWDER, FOR SOLUTION INTRAMUSCULAR; INTRAVENOUS at 08:04

## 2019-04-06 RX ADMIN — PROPOFOL 30 MCG/KG/MIN: 10 INJECTION, EMULSION INTRAVENOUS at 05:04

## 2019-04-06 RX ADMIN — POLYETHYLENE GLYCOL 3350 17 G: 17 POWDER, FOR SOLUTION ORAL at 08:04

## 2019-04-06 RX ADMIN — CARVEDILOL 3.12 MG: 3.12 TABLET, FILM COATED ORAL at 12:04

## 2019-04-06 RX ADMIN — ASPIRIN 325 MG ORAL TABLET 325 MG: 325 PILL ORAL at 08:04

## 2019-04-06 RX ADMIN — PANTOPRAZOLE SODIUM 40 MG: 40 INJECTION, POWDER, LYOPHILIZED, FOR SOLUTION INTRAVENOUS at 08:04

## 2019-04-06 RX ADMIN — CEFAZOLIN 2 G: 330 INJECTION, POWDER, FOR SOLUTION INTRAMUSCULAR; INTRAVENOUS at 11:04

## 2019-04-06 RX ADMIN — FUROSEMIDE 40 MG: 10 INJECTION, SOLUTION INTRAMUSCULAR; INTRAVENOUS at 02:04

## 2019-04-06 RX ADMIN — SODIUM PHOSPHATE, MONOBASIC, MONOHYDRATE 20.01 MMOL: 276; 142 INJECTION, SOLUTION INTRAVENOUS at 10:04

## 2019-04-06 RX ADMIN — IPRATROPIUM BROMIDE AND ALBUTEROL SULFATE 3 ML: .5; 3 SOLUTION RESPIRATORY (INHALATION) at 03:04

## 2019-04-06 RX ADMIN — IPRATROPIUM BROMIDE AND ALBUTEROL SULFATE 3 ML: .5; 3 SOLUTION RESPIRATORY (INHALATION) at 11:04

## 2019-04-06 RX ADMIN — DOCUSATE SODIUM 100 MG: 100 CAPSULE, LIQUID FILLED ORAL at 09:04

## 2019-04-06 RX ADMIN — ATORVASTATIN CALCIUM 10 MG: 10 TABLET, FILM COATED ORAL at 12:04

## 2019-04-06 RX ADMIN — MUPIROCIN 1 G: 20 OINTMENT TOPICAL at 09:04

## 2019-04-06 NOTE — SUBJECTIVE & OBJECTIVE
Scheduled Meds:   albuterol-ipratropium  3 mL Nebulization Q4H    aspirin  325 mg Oral Daily    ceFAZolin (ANCEF) IVPB  2 g Intravenous Q8H    docusate sodium  100 mg Oral BID    mupirocin  1 g Nasal BID    pantoprazole  40 mg Intravenous Daily    polyethylene glycol  17 g Oral Daily     Continuous Infusions:   sodium chloride 0.9% Stopped (04/05/19 0745)    dextrose 5 % and 0.45 % NaCl with KCl 20 mEq 5 mL/hr at 04/06/19 0700    epinephrine infusion Stopped (04/05/19 1530)    insulin (HUMAN R) infusion (adults) Stopped (04/05/19 1900)    nicardipine 5 mg/hr (04/06/19 0700)    propofol 25 mcg/kg/min (04/06/19 0700)     PRN Meds:sodium chloride, sodium chloride, acetaminophen, albuterol-ipratropium, aspirin, bisacodyl, dextrose 50%, dextrose 50%, fentaNYL, fentaNYL **FOLLOWED BY** [START ON 4/7/2019] fentaNYL, magnesium sulfate IVPB, magnesium sulfate IVPB, metoclopramide HCl, ondansetron, oxyCODONE, oxyCODONE, potassium chloride in water **AND** potassium chloride in water **AND** potassium chloride in water, sodium chloride 0.9%, sodium phosphate IVPB, sodium phosphate IVPB, sodium phosphate IVPB    Review of Systems  Objective:     Vital Signs (Most Recent):  Temp: (!) 100.5 °F (38.1 °C) (04/06/19 0500)  Pulse: 89 (04/06/19 0710)  Resp: 16 (04/06/19 0710)  BP: 98/66 (04/06/19 0706)  SpO2: 99 % (04/06/19 0710) Vital Signs (24h Range):  Temp:  [96.8 °F (36 °C)-101.8 °F (38.8 °C)] 100.5 °F (38.1 °C)  Pulse:  [] 89  Resp:  [13-21] 16  SpO2:  [82 %-100 %] 99 %  BP: ()/(57-88) 98/66  Arterial Line BP: ()/() 91/53     Weight: 77.3 kg (170 lb 6.7 oz)  Body mass index is 23.77 kg/m².    Intake/Output - Last 3 Shifts       04/04 0700 - 04/05 0659 04/05 0700 - 04/06 0659 04/06 0700 - 04/07 0659    I.V. (mL/kg)  5939.5 (76.8)     Blood  3760     NG/GT  60     Total Intake(mL/kg)  9759.5 (126.3)     Urine (mL/kg/hr)  3500 (1.9)     Drains  150     Chest Tube  1770     Total Output  5420      "Net  +4339.5                  Physical Exam    Laboratory:  Immunosuppressants     None        {Results:81841::"Labs within the past 24 hours have been reviewed."}    Diagnostic Results:  {Select Imagin}  "

## 2019-04-06 NOTE — PROGRESS NOTES
Notified Dr. Peters of patients temp of 101.6. Temperature in room decreased and blankets removed from patient. No further orders at this time. Will monitor patient closely.

## 2019-04-06 NOTE — SUBJECTIVE & OBJECTIVE
Interval History: s/p Bentall procedure on 4/5.   No acute events overnight. Required blood procedure during the day shift but nothing overnight. CT output significantly decreased on 230cc in PM shift.   Intubated on minimal vent settings. Off pressors. On cardene 2.     ROS  Medications:  Continuous Infusions:   sodium chloride 0.9% Stopped (04/05/19 0745)    dextrose 5 % and 0.45 % NaCl with KCl 20 mEq 5 mL/hr at 04/06/19 1000    epinephrine infusion Stopped (04/05/19 1530)    insulin (HUMAN R) infusion (adults) Stopped (04/05/19 1900)    nicardipine 2.5 mg/hr (04/06/19 1000)    propofol Stopped (04/06/19 0900)     Scheduled Meds:   albuterol-ipratropium  3 mL Nebulization Q4H    aspirin  325 mg Oral Daily    ceFAZolin (ANCEF) IVPB  2 g Intravenous Q8H    docusate sodium  100 mg Oral BID    mupirocin  1 g Nasal BID    pantoprazole  40 mg Intravenous Daily    polyethylene glycol  17 g Oral Daily     PRN Meds:sodium chloride, sodium chloride, acetaminophen, albuterol-ipratropium, aspirin, bisacodyl, dextrose 50%, dextrose 50%, fentaNYL, fentaNYL **FOLLOWED BY** [START ON 4/7/2019] fentaNYL, magnesium sulfate IVPB, magnesium sulfate IVPB, metoclopramide HCl, ondansetron, oxyCODONE, oxyCODONE, potassium chloride in water **AND** potassium chloride in water **AND** potassium chloride in water, sodium chloride 0.9%, sodium phosphate IVPB, sodium phosphate IVPB, sodium phosphate IVPB     Objective:     Vital Signs (Most Recent):  Temp: (!) 100.5 °F (38.1 °C) (04/06/19 1100)  Pulse: 96 (04/06/19 1100)  Resp: (!) 21 (04/06/19 1100)  BP: 107/71 (04/06/19 1030)  SpO2: 98 % (04/06/19 1100) Vital Signs (24h Range):  Temp:  [96.8 °F (36 °C)-101.8 °F (38.8 °C)] 100.5 °F (38.1 °C)  Pulse:  [] 96  Resp:  [13-27] 21  SpO2:  [82 %-100 %] 98 %  BP: ()/(57-88) 107/71  Arterial Line BP: ()/() 111/62     Weight: 77.3 kg (170 lb 6.7 oz)  Body mass index is 23.77 kg/m².    SpO2: 98 %  O2 Device (Oxygen  Therapy): nasal cannula    Intake/Output - Last 3 Shifts       04/04 0700 - 04/05 0659 04/05 0700 - 04/06 0659 04/06 0700 - 04/07 0659    I.V. (mL/kg)  5939.5 (76.8)     Blood  3760     NG/GT  60 120    Total Intake(mL/kg)  9759.5 (126.3) 120 (1.6)    Urine (mL/kg/hr)  3500 (1.9) 550 (1.7)    Drains  150     Chest Tube  1770 50    Total Output  5420 600    Net  +4339.5 -480                 Lines/Drains/Airways     Central Venous Catheter Line                 Pulmonary Artery Catheter Assessment  04/05/19 0745 1 day          Drain                 NG/OG Tube 04/05/19 1 day         Urethral Catheter 04/05/19 0735 Non-latex;Temperature probe 14 Fr. 1 day         Y Chest Tube 1 and 2 04/05/19 1111 1 Anterior Mediastinal 19 Fr. Posterior Mediastinal 19 Fr. less than 1 day          Arterial Line                 Arterial Line 04/05/19 0710 Left Radial 1 day          Line                 Pacer Wires 04/05/19 1318 less than 1 day          Peripheral Intravenous Line                 Peripheral IV - Single Lumen 04/05/19 0550 Right Forearm 1 day         Peripheral IV - Single Lumen 04/05/19 0745 Left Upper Arm 1 day                Physical Exam   Constitutional: He appears well-developed and well-nourished. No distress.   Cardiovascular: Normal rate and regular rhythm.   Midline incision covered with island dressing. CT in place with ss output    Pulmonary/Chest: Effort normal. No respiratory distress.   Abdominal: Soft. He exhibits no distension. There is no tenderness.   Genitourinary:   Genitourinary Comments: Vazquez in place   Musculoskeletal: Normal range of motion.   Neurological: He is alert.   When off sedation   Skin: Skin is warm and dry. He is not diaphoretic.       Significant Labs:  ABGs:   Recent Labs   Lab 04/06/19  1002   PH 7.443   PCO2 33.5*   PO2 130*   HCO3 22.9*   POCSATURATED 99   BE -1     Cardiac markers: No results for input(s): CKMB, CPKMB, TROPONINT, TROPONINI, MYOGLOBIN in the last 48 hours.  CBC:    Recent Labs   Lab 04/06/19 0332   WBC 5.91  5.91   RBC 2.77*  2.77*   HGB 8.9*  8.9*   HCT 26.3*  26.3*   PLT 73*  73*   MCV 95  95   MCH 32.1*  32.1*   MCHC 33.8  33.8     CMP:   Recent Labs   Lab 04/05/19 2201 04/06/19 0332 04/06/19 0845    113*  113*  --    CALCIUM 8.4* 8.1*  8.1*  --    ALBUMIN 3.3*  --   --    PROT 5.4*  --   --     142  142  --    K 3.9 4.4  4.4 4.4   CO2 28 28  28  --     107  107  --    BUN 13 13  13  --    CREATININE 0.9 1.1  1.1  --    ALKPHOS 46*  --   --    ALT 16  --   --    AST 37  --   --    BILITOT 3.4*  --   --      Coagulation:   Recent Labs   Lab 04/06/19 0332   INR 1.0  1.0   APTT 26.0  26.0     Lactic Acid: No results for input(s): LACTATE in the last 48 hours.  LFTs:   Recent Labs   Lab 04/05/19 2201   ALT 16   AST 37   ALKPHOS 46*   BILITOT 3.4*   PROT 5.4*   ALBUMIN 3.3*       Significant Diagnostics:  CXR: I have reviewed all pertinent results/findings within the past 24 hours

## 2019-04-06 NOTE — ASSESSMENT & PLAN NOTE
60 yo male s/p Bentall procedure on 4/5     Plan:  Neuro:   -Awake and alert.   -Sedated on propofol. Will wean for extubation this morning   -Pain control: prn oxycodone, prn dilaudid      Pulmonary:   -Vent Mode: Spont  Oxygen Concentration (%):  [] 41  Resp Rate Total:  [14 br/min-37 br/min] 18 br/min  Vt Set:  [400 mL] 400 mL  PEEP/CPAP:  [5 cmH20] 5 cmH20  Pressure Support:  [5 cmH20] 5 cmH20  Mean Airway Pressure:  [6.4 cmH20-8.6 cmH20] 6.6 cmH20   -PLAN FOR EXTUBATION TODAY. Parameters per resp  -CXR daily   -IS, duonebs     Cardiac:  -MAP: 60-80 goal  -Cardene 2, wean as tolerated. Will add PRN meds  -Not requiring pressor support   -CT with ss output, will monitor output.  -ASA, statin   -Coreg 3.125 BID     FEN/GI:  -Cardiac diet advance   -Bowel regimen: miralax, docusate  -Protonix 40mg daily      /Renal:   - Vazquez: in place  - BUN/Cr: 13/1.1, will trend  - UOP: 3500cc since admission to sicu      Infectious Disease:   -Afebrile  -WBC: 5.9, will trend   -Antibiotics:ancef, post operative dose   -Cultures: none      Hematology/Oncology:  -H/H: 8.9/26.3, will trend   -required no blood products overnight   - ASA      Endocrine:  -BG: insulin gtt      Dispo:CTS primary. Continue care in SICU. Extubate today

## 2019-04-06 NOTE — ASSESSMENT & PLAN NOTE
59 y.o. male w/ a significant PMHx of HTN, AI, and ascending aortic aneurysm (max diameter of ascending aorta is 4.3 cm) who underwent Bentall Procedure on 04/05.      Neuro:   - Sedated on propofol while intubated.   - PRN pain medications     Pulmonary:   - Intubated. Weaning per protocol. Currently on AC/VC+  - Daily CXR and ABG  - Scheduled duonebs     Recent Labs     04/06/19  0332   PH 7.397   PCO2 46.2*   PO2 41   HCO3 28.4*   POCSATURATED 75*   BE 4      Vent Mode: A/C  Oxygen Concentration (%):  [] 40  Resp Rate Total:  [14 br/min-28 br/min] 19 br/min  Vt Set:  [400 mL] 400 mL  PEEP/CPAP:  [5 cmH20] 5 cmH20  Mean Airway Pressure:  [6.7 cmH20-8.6 cmH20] 6.7 cmH20    Cardiac:   - S/p Bentall Procedure on 04/05  - Epi and Norepi gtts weaned off.   - Cardene gtt @ 2.5mg/h to maintain map <80     Renal:    - Vazquez in place  - Monitor UOP  - BUN/Cr wnl       Intake/Output Summary (Last 24 hours) at 4/6/2019 0552  Last data filed at 4/6/2019 0515  Gross per 24 hour   Intake 9759.5 ml   Output 5290 ml   Net 4469.5 ml       ID:   - Febrile overnight Tmax 101.8, came down to 100.5 with removal of blankets.  - No leukocytosis, will continue to monitor   - Ancef post op per protocol  - Daily CXR while intubated  - Cont to monitor     Hem/Onc:   - Chest tube put out approx 1500 cc upon arrival to SICU  - Chest tube output significantly dropped off.  Currently 10-30 cc per hour. Will continue to monitor.   - Transfused 3u PRBC, 4 FFP, and 2 PLT  - H/H stable at this time, Hgb 8.9, Hct 26.3  - Will continue to monitor     Endocrine:    - Insulin gtt  - Monitor BG      Fluids/Electrolytes/Nutrition/GI:   - Replace electrolytes PRN     PPx: per Primary        DISPO:  Cont SICU care     Critical care was time spent personally by me on the following activities: development of treatment plan with patient or surrogate and bedside caregivers, discussions with consultants, evaluation of patient's response to treatment,  examination of patient, ordering and performing treatments and interventions, ordering and review of laboratory studies, ordering and review of radiographic studies, pulse oximetry, re-evaluation of patient's condition. This critical care time did not overlap with that of any other provider or involve time for any procedures.     Erika Restrepo CA1

## 2019-04-06 NOTE — SUBJECTIVE & OBJECTIVE
Interval History/Significant Events: NAEON. Remains intubated and sedated on propofol.  Nicardipine gtt.  Febrile overnight to Tmax 101.8, blankets removed per primary team and Temp currently 100.5.      Follow-up For: Procedure(s) (LRB):  BENTALL PROCEDURE (N/A)    Post-Operative Day: 1 Day Post-Op    Objective:     Vital Signs (Most Recent):  Temp: (!) 100.5 °F (38.1 °C) (04/06/19 0500)  Pulse: 85 (04/06/19 0500)  Resp: 15 (04/06/19 0500)  BP: 98/62 (04/06/19 0500)  SpO2: 98 % (04/06/19 0500) Vital Signs (24h Range):  Temp:  [96.8 °F (36 °C)-101.8 °F (38.8 °C)] 100.5 °F (38.1 °C)  Pulse:  [] 85  Resp:  [13-20] 15  SpO2:  [82 %-100 %] 98 %  BP: ()/(57-88) 98/62  Arterial Line BP: ()/() 103/58     Weight: 77.3 kg (170 lb 6.7 oz)  Body mass index is 23.77 kg/m².      Intake/Output Summary (Last 24 hours) at 4/6/2019 0541  Last data filed at 4/6/2019 0515  Gross per 24 hour   Intake 9759.5 ml   Output 5290 ml   Net 4469.5 ml       Physical Exam   Constitutional: He appears well-developed and well-nourished. No distress.   HENT:   Head: Normocephalic and atraumatic.   Eyes: Pupils are equal, round, and reactive to light. EOM are normal.   Neck: Neck supple.   Cardiovascular: Normal rate and regular rhythm.   Pulmonary/Chest:   Intubated AC/VC+   Abdominal: Soft.   Musculoskeletal: He exhibits no edema.   Neurological:   Sedated but arouses to verbal and tactile stimulus   Skin: Skin is warm and dry.       Vents:  Vent Mode: A/C (04/06/19 0451)  Ventilator Initiated: Yes (04/05/19 1518)  Set Rate: 14 bmp (04/06/19 0451)  Vt Set: 400 mL (04/06/19 0451)  PEEP/CPAP: 5 cmH20 (04/06/19 0451)  Oxygen Concentration (%): 40 (04/06/19 0500)  Peak Airway Pressure: 10 cmH2O (04/06/19 0451)  Plateau Pressure: 0 cmH20 (04/06/19 0451)  Total Ve: 7.65 mL (04/06/19 0451)  F/VT Ratio<105 (RSBI): (!) 33.41 (04/06/19 0451)    Lines/Drains/Airways     Central Venous Catheter Line                 Pulmonary Artery  Catheter Assessment  04/05/19 0745 less than 1 day          Drain                 NG/OG Tube 04/05/19 1 day         Urethral Catheter 04/05/19 0735 Non-latex;Temperature probe 14 Fr. less than 1 day         Y Chest Tube 1 and 2 04/05/19 1111 1 Anterior Mediastinal 19 Fr. Posterior Mediastinal 19 Fr. less than 1 day          Airway                 Airway - Non-Surgical 04/05/19 0644 less than 1 day         Airway - Non-Surgical 04/05/19 0733 Endotracheal Tube less than 1 day          Arterial Line                 Arterial Line 04/05/19 0710 Left Radial less than 1 day          Line                 Pacer Wires 04/05/19 1318 less than 1 day          Peripheral Intravenous Line                 Peripheral IV - Single Lumen 04/05/19 0550 Right Forearm less than 1 day         Peripheral IV - Single Lumen 04/05/19 0745 Left Upper Arm less than 1 day                Significant Labs:    CBC/Anemia Profile:  Recent Labs   Lab 04/05/19  1518 04/05/19 2201 04/06/19  0332   WBC 7.03 4.70 5.91  5.91   HGB 7.6* 9.0* 8.9*  8.9*   HCT 22.5* 26.8* 26.3*  26.3*   PLT 50* 72* 73*  73*   MCV 98 94 95  95   RDW 13.0 13.5 13.8  13.8        Chemistries:  Recent Labs   Lab 04/04/19  1152 04/05/19  1518 04/05/19 2201 04/06/19  0332    141 143 142  142   K 4.4 4.4 3.9 4.4  4.4    105 108 107  107   CO2 31* 26 28 28  28   BUN 13 13 13 13  13   CREATININE 1.2 0.9 0.9 1.1  1.1   CALCIUM 9.8 7.8* 8.4* 8.1*  8.1*   ALBUMIN 4.2  --  3.3*  --    PROT 7.7  --  5.4*  --    BILITOT 1.7*  --  3.4*  --    ALKPHOS 72  --  46*  --    ALT 23  --  16  --    AST 25  --  37  --    MG  --  2.2 1.9 2.4  2.4   PHOS  --  3.4 2.6* 3.5  3.5       ABGs:   Recent Labs   Lab 04/06/19 0332   PH 7.397   PCO2 46.2*   HCO3 28.4*   POCSATURATED 75*   BE 4     CMP:   Recent Labs   Lab 04/04/19  1152 04/05/19  1518 04/05/19  2201 04/06/19 0332    141 143 142  142   K 4.4 4.4 3.9 4.4  4.4    105 108 107  107   CO2 31* 26 28 28  28    GLU 70 182* 103 113*  113*   BUN 13 13 13 13  13   CREATININE 1.2 0.9 0.9 1.1  1.1   CALCIUM 9.8 7.8* 8.4* 8.1*  8.1*   PROT 7.7  --  5.4*  --    ALBUMIN 4.2  --  3.3*  --    BILITOT 1.7*  --  3.4*  --    ALKPHOS 72  --  46*  --    AST 25  --  37  --    ALT 23  --  16  --    ANIONGAP 6* 10 7* 7*  7*   EGFRNONAA >60.0 >60.0 >60.0 >60.0  >60.0       Significant Imaging:  I have reviewed all pertinent imaging results/findings within the past 24 hours.

## 2019-04-06 NOTE — ASSESSMENT & PLAN NOTE
BG goal 140 - 180     Continue IV insulin infusion protocol  Requires intensive BG monitoring while on protocol     Discharge planning: JUNIE

## 2019-04-06 NOTE — PROGRESS NOTES
[facility]  Cardiothoracic Surgery  Progress Note    Patient Name: Devin Lopez  MRN: [id number]  Admission Date: 4/5/2019  Hospital Length of Stay: 1 days  Code Status: Full Code   Attending Physician: Yuri Burks MD   Referring Provider: Yuri Burks MD  Principal Problem:S/P aortic valve repair            Subjective:     Post-Op Info:  Procedure(s) (LRB):  BENTALL PROCEDURE (N/A)   1 Day Post-Op     Interval History: s/p Bentall procedure on 4/5.   No acute events overnight. Required blood procedure during the day shift but nothing overnight. CT output significantly decreased on 230cc in PM shift.   Intubated on minimal vent settings. Off pressors. On cardene 2.     ROS  Medications:  Continuous Infusions:   sodium chloride 0.9% Stopped (04/05/19 0745)    dextrose 5 % and 0.45 % NaCl with KCl 20 mEq 5 mL/hr at 04/06/19 1000    epinephrine infusion Stopped (04/05/19 1530)    insulin (HUMAN R) infusion (adults) Stopped (04/05/19 1900)    nicardipine 2.5 mg/hr (04/06/19 1000)    propofol Stopped (04/06/19 0900)     Scheduled Meds:   albuterol-ipratropium  3 mL Nebulization Q4H    aspirin  325 mg Oral Daily    ceFAZolin (ANCEF) IVPB  2 g Intravenous Q8H    docusate sodium  100 mg Oral BID    mupirocin  1 g Nasal BID    pantoprazole  40 mg Intravenous Daily    polyethylene glycol  17 g Oral Daily     PRN Meds:sodium chloride, sodium chloride, acetaminophen, albuterol-ipratropium, aspirin, bisacodyl, dextrose 50%, dextrose 50%, fentaNYL, fentaNYL **FOLLOWED BY** [START ON 4/7/2019] fentaNYL, magnesium sulfate IVPB, magnesium sulfate IVPB, metoclopramide HCl, ondansetron, oxyCODONE, oxyCODONE, potassium chloride in water **AND** potassium chloride in water **AND** potassium chloride in water, sodium chloride 0.9%, sodium phosphate IVPB, sodium phosphate IVPB, sodium phosphate IVPB     Objective:     Vital Signs (Most Recent):  Temp: (!) 100.5 °F (38.1 °C) (04/06/19  1100)  Pulse: 96 (04/06/19 1100)  Resp: (!) 21 (04/06/19 1100)  BP: 107/71 (04/06/19 1030)  SpO2: 98 % (04/06/19 1100) Vital Signs (24h Range):  Temp:  [96.8 °F (36 °C)-101.8 °F (38.8 °C)] 100.5 °F (38.1 °C)  Pulse:  [] 96  Resp:  [13-27] 21  SpO2:  [82 %-100 %] 98 %  BP: ()/(57-88) 107/71  Arterial Line BP: ()/() 111/62     Weight: 77.3 kg (170 lb 6.7 oz)  Body mass index is 23.77 kg/m².    SpO2: 98 %  O2 Device (Oxygen Therapy): nasal cannula    Intake/Output - Last 3 Shifts       04/04 0700 - 04/05 0659 04/05 0700 - 04/06 0659 04/06 0700 - 04/07 0659    I.V. (mL/kg)  5939.5 (76.8)     Blood  3760     NG/GT  60 120    Total Intake(mL/kg)  9759.5 (126.3) 120 (1.6)    Urine (mL/kg/hr)  3500 (1.9) 550 (1.7)    Drains  150     Chest Tube  1770 50    Total Output  5420 600    Net  +4339.5 -480                 Lines/Drains/Airways     Central Venous Catheter Line                 Pulmonary Artery Catheter Assessment  04/05/19 0745 1 day          Drain                 NG/OG Tube 04/05/19 1 day         Urethral Catheter 04/05/19 0735 Non-latex;Temperature probe 14 Fr. 1 day         Y Chest Tube 1 and 2 04/05/19 1111 1 Anterior Mediastinal 19 Fr. Posterior Mediastinal 19 Fr. less than 1 day          Arterial Line                 Arterial Line 04/05/19 0710 Left Radial 1 day          Line                 Pacer Wires 04/05/19 1318 less than 1 day          Peripheral Intravenous Line                 Peripheral IV - Single Lumen 04/05/19 0550 Right Forearm 1 day         Peripheral IV - Single Lumen 04/05/19 0745 Left Upper Arm 1 day                Physical Exam   Constitutional: He appears well-developed and well-nourished. No distress.   Cardiovascular: Normal rate and regular rhythm.   Midline incision covered with island dressing. CT in place with ss output    Pulmonary/Chest: Effort normal. No respiratory distress.   Abdominal: Soft. He exhibits no distension. There is no tenderness.   Genitourinary:    Genitourinary Comments: Vazquez in place   Musculoskeletal: Normal range of motion.   Neurological: He is alert.   When off sedation   Skin: Skin is warm and dry. He is not diaphoretic.       Significant Labs:  ABGs:   Recent Labs   Lab 04/06/19  1002   PH 7.443   PCO2 33.5*   PO2 130*   HCO3 22.9*   POCSATURATED 99   BE -1     Cardiac markers: No results for input(s): CKMB, CPKMB, TROPONINT, TROPONINI, MYOGLOBIN in the last 48 hours.  CBC:   Recent Labs   Lab 04/06/19 0332   WBC 5.91  5.91   RBC 2.77*  2.77*   HGB 8.9*  8.9*   HCT 26.3*  26.3*   PLT 73*  73*   MCV 95  95   MCH 32.1*  32.1*   MCHC 33.8  33.8     CMP:   Recent Labs   Lab 04/05/19 2201 04/06/19 0332 04/06/19  0845    113*  113*  --    CALCIUM 8.4* 8.1*  8.1*  --    ALBUMIN 3.3*  --   --    PROT 5.4*  --   --     142  142  --    K 3.9 4.4  4.4 4.4   CO2 28 28  28  --     107  107  --    BUN 13 13  13  --    CREATININE 0.9 1.1  1.1  --    ALKPHOS 46*  --   --    ALT 16  --   --    AST 37  --   --    BILITOT 3.4*  --   --      Coagulation:   Recent Labs   Lab 04/06/19 0332   INR 1.0  1.0   APTT 26.0  26.0     Lactic Acid: No results for input(s): LACTATE in the last 48 hours.  LFTs:   Recent Labs   Lab 04/05/19 2201   ALT 16   AST 37   ALKPHOS 46*   BILITOT 3.4*   PROT 5.4*   ALBUMIN 3.3*       Significant Diagnostics:  CXR: I have reviewed all pertinent results/findings within the past 24 hours    Assessment/Plan:     * S/P aortic valve repair  58 yo male s/p Bentall procedure on 4/5     Plan:  Neuro:   -Awake and alert.   -Sedated on propofol. Will wean for extubation this morning   -Pain control: prn oxycodone, prn dilaudid      Pulmonary:   -Vent Mode: Spont  Oxygen Concentration (%):  [] 41  Resp Rate Total:  [14 br/min-37 br/min] 18 br/min  Vt Set:  [400 mL] 400 mL  PEEP/CPAP:  [5 cmH20] 5 cmH20  Pressure Support:  [5 cmH20] 5 cmH20  Mean Airway Pressure:  [6.4 cmH20-8.6 cmH20] 6.6 cmH20   -PLAN FOR  EXTUBATION TODAY. Parameters per resp  -CXR daily   -IS, duonebs     Cardiac:  -MAP:  60-80 goal  -Cardene 2, wean as tolerated. Will add PRN meds  -Not requiring pressor support   -CT with ss output, will monitor output.  -ASA, statin   -Coreg 3.125 BID     FEN/GI:  -Cardiac diet advance   -Bowel regimen: miralax, docusate  -Protonix 40mg daily      /Renal:   - Vazquez: in place  - BUN/Cr:  13/1.1, will trend  - UOP:  3500cc since admission to sicu      Infectious Disease:   -Afebrile  -WBC:  5.9, will trend   -Antibiotics:ancef, post operative dose   -Cultures: none      Hematology/Oncology:  -H/H:  8.9/26.3, will trend   -required no blood products overnight   - ASA      Endocrine:  -BG: insulin gtt      Dispo:CTS primary. Continue care in SICU. Extubate today          Blanca Gómez MD  Cardiothoracic Surgery  [facility]

## 2019-04-06 NOTE — PROGRESS NOTES
Chest tube drained 1250cc bright red blood within the hour of arrival from OR. Dr. Burks and CTS team at bedside to place orders. Patient received a total of 3u PRBC, 4 FFP, and 2 PLT.  On propofol gtt for sedation and administering prn fentanyl 25mcg IVP. Epi and Norepi gtts eventually weaned off. Cardene gtt @ 2.5mg/h to maintain map<80. Insulin gtt with blood glucose checks q1h. Family updated on plan of care.

## 2019-04-06 NOTE — PROGRESS NOTES
[facility]  Critical Care - Surgery  Progress Note    Patient Name: Devin Lopez  MRN: [id number]  Admission Date: 4/5/2019  Hospital Length of Stay: 1 days  Code Status: Full Code  Attending Provider: Yuri Burks MD  Primary Care Provider: Gage Jaimes Jr, MD (Inactive)   Principal Problem: S/P aortic valve repair    Subjective:     Hospital/ICU Course:  No notes on file    Interval History/Significant Events: NAEON. Remains intubated and sedated on propofol.  Nicardipine gtt.  Febrile overnight to Tmax 101.8, blankets removed per primary team and Temp currently 100.5.      Follow-up For: Procedure(s) (LRB):  BENTALL PROCEDURE (N/A)    Post-Operative Day: 1 Day Post-Op    Objective:     Vital Signs (Most Recent):  Temp: (!) 100.5 °F (38.1 °C) (04/06/19 0500)  Pulse: 85 (04/06/19 0500)  Resp: 15 (04/06/19 0500)  BP: 98/62 (04/06/19 0500)  SpO2: 98 % (04/06/19 0500) Vital Signs (24h Range):  Temp:  [96.8 °F (36 °C)-101.8 °F (38.8 °C)] 100.5 °F (38.1 °C)  Pulse:  [] 85  Resp:  [13-20] 15  SpO2:  [82 %-100 %] 98 %  BP: ()/(57-88) 98/62  Arterial Line BP: ()/() 103/58     Weight: 77.3 kg (170 lb 6.7 oz)  Body mass index is 23.77 kg/m².      Intake/Output Summary (Last 24 hours) at 4/6/2019 0541  Last data filed at 4/6/2019 0515  Gross per 24 hour   Intake 9759.5 ml   Output 5290 ml   Net 4469.5 ml       Physical Exam   Constitutional: He appears well-developed and well-nourished. No distress.   HENT:   Head: Normocephalic and atraumatic.   Eyes: Pupils are equal, round, and reactive to light. EOM are normal.   Neck: Neck supple.   Cardiovascular: Normal rate and regular rhythm.   Pulmonary/Chest:   Intubated AC/VC+   Abdominal: Soft.   Musculoskeletal: He exhibits no edema.   Neurological:   Sedated but arouses to verbal and tactile stimulus   Skin: Skin is warm and dry.       Vents:  Vent Mode: A/C (04/06/19 0451)  Ventilator Initiated: Yes (04/05/19 5588)  Set  Rate: 14 bmp (04/06/19 0451)  Vt Set: 400 mL (04/06/19 0451)  PEEP/CPAP: 5 cmH20 (04/06/19 0451)  Oxygen Concentration (%): 40 (04/06/19 0500)  Peak Airway Pressure: 10 cmH2O (04/06/19 0451)  Plateau Pressure: 0 cmH20 (04/06/19 0451)  Total Ve: 7.65 mL (04/06/19 0451)  F/VT Ratio<105 (RSBI): (!) 33.41 (04/06/19 0451)    Lines/Drains/Airways     Central Venous Catheter Line                 Pulmonary Artery Catheter Assessment  04/05/19 0745 less than 1 day          Drain                 NG/OG Tube 04/05/19 1 day         Urethral Catheter 04/05/19 0735 Non-latex;Temperature probe 14 Fr. less than 1 day         Y Chest Tube 1 and 2 04/05/19 1111 1 Anterior Mediastinal 19 Fr. Posterior Mediastinal 19 Fr. less than 1 day          Airway                 Airway - Non-Surgical 04/05/19 0644 less than 1 day         Airway - Non-Surgical 04/05/19 0733 Endotracheal Tube less than 1 day          Arterial Line                 Arterial Line 04/05/19 0710 Left Radial less than 1 day          Line                 Pacer Wires 04/05/19 1318 less than 1 day          Peripheral Intravenous Line                 Peripheral IV - Single Lumen 04/05/19 0550 Right Forearm less than 1 day         Peripheral IV - Single Lumen 04/05/19 0745 Left Upper Arm less than 1 day                Significant Labs:    CBC/Anemia Profile:  Recent Labs   Lab 04/05/19  1518 04/05/19 2201 04/06/19  0332   WBC 7.03 4.70 5.91  5.91   HGB 7.6* 9.0* 8.9*  8.9*   HCT 22.5* 26.8* 26.3*  26.3*   PLT 50* 72* 73*  73*   MCV 98 94 95  95   RDW 13.0 13.5 13.8  13.8        Chemistries:  Recent Labs   Lab 04/04/19  1152 04/05/19  1518 04/05/19 2201 04/06/19  0332    141 143 142  142   K 4.4 4.4 3.9 4.4  4.4    105 108 107  107   CO2 31* 26 28 28  28   BUN 13 13 13 13  13   CREATININE 1.2 0.9 0.9 1.1  1.1   CALCIUM 9.8 7.8* 8.4* 8.1*  8.1*   ALBUMIN 4.2  --  3.3*  --    PROT 7.7  --  5.4*  --    BILITOT 1.7*  --  3.4*  --    ALKPHOS 72  --  46*   --    ALT 23  --  16  --    AST 25  --  37  --    MG  --  2.2 1.9 2.4  2.4   PHOS  --  3.4 2.6* 3.5  3.5       ABGs:   Recent Labs   Lab 04/06/19  0332   PH 7.397   PCO2 46.2*   HCO3 28.4*   POCSATURATED 75*   BE 4     CMP:   Recent Labs   Lab 04/04/19  1152 04/05/19  1518 04/05/19  2201 04/06/19  0332    141 143 142  142   K 4.4 4.4 3.9 4.4  4.4    105 108 107  107   CO2 31* 26 28 28  28   GLU 70 182* 103 113*  113*   BUN 13 13 13 13  13   CREATININE 1.2 0.9 0.9 1.1  1.1   CALCIUM 9.8 7.8* 8.4* 8.1*  8.1*   PROT 7.7  --  5.4*  --    ALBUMIN 4.2  --  3.3*  --    BILITOT 1.7*  --  3.4*  --    ALKPHOS 72  --  46*  --    AST 25  --  37  --    ALT 23  --  16  --    ANIONGAP 6* 10 7* 7*  7*   EGFRNONAA >60.0 >60.0 >60.0 >60.0  >60.0       Significant Imaging:  I have reviewed all pertinent imaging results/findings within the past 24 hours.    Assessment/Plan:     * S/P aortic valve repair  59 y.o. male w/ a significant PMHx of HTN, AI, and ascending aortic aneurysm (max diameter of ascending aorta is 4.3 cm) who underwent Bentall Procedure on 04/05.      Neuro:   - Sedated on propofol while intubated.   - PRN pain medications     Pulmonary:   - Intubated . Weaning per protocol. Currently on AC/VC+  - Daily CXR and ABG  - Scheduled duonebs     Recent Labs     04/06/19 0332   PH 7.397   PCO2 46.2*   PO2 41   HCO3 28.4*   POCSATURATED 75*   BE 4      Vent Mode: A/C  Oxygen Concentration (%):  [] 40  Resp Rate Total:  [14 br/min-28 br/min] 19 br/min  Vt Set:  [400 mL] 400 mL  PEEP/CPAP:  [5 cmH20] 5 cmH20  Mean Airway Pressure:  [6.7 cmH20-8.6 cmH20] 6.7 cmH20    Cardiac:   - S/p Bentall Procedure on 04/05  - Epi and Norepi gtts weaned off.   - Cardene gtt @ 2.5mg/h to maintain map <80     Renal:    - Vazquez in place  - Monitor UOP  - BUN/Cr wnl       Intake/Output Summary (Last 24 hours) at 4/6/2019 0552  Last data filed at 4/6/2019 0515  Gross per 24 hour   Intake 9759.5 ml   Output 5290 ml    Net 4469.5 ml       ID:   - Febrile overnight Tmax 101.8, came down to 100.5 with removal of blankets.  - No leukocytosis, will continue to monitor   - Ancef post op per protocol  - Daily CXR while intubated  - Cont to monitor     Hem/Onc:   - Chest tube put out approx 1500 cc upon arrival to SICU  - Chest tube output significantly dropped off.  Currently 10-30 cc per hour. Will continue to monitor.   - Transfused 3u PRBC, 4 FFP, and 2 PLT  - H/H stable at this time, Hgb 8.9, Hct 26.3  - Will continue to monitor     Endocrine:    - Insulin gtt  - Monitor BG      Fluids/Electrolytes/Nutrition/GI:   - Replace electrolytes PRN     PPx: per Primary        DISPO:  Cont SICU care     Critical care was time spent personally by me on the following activities: development of treatment plan with patient or surrogate and bedside caregivers, discussions with consultants, evaluation of patient's response to treatment, examination of patient, ordering and performing treatments and interventions, ordering and review of laboratory studies, ordering and review of radiographic studies, pulse oximetry, re-evaluation of patient's condition. This critical care time did not overlap with that of any other provider or involve time for any procedures.     Erika Restrepo [facility]        Critical Care - Surgery  [facility]

## 2019-04-06 NOTE — PROGRESS NOTES
SBT passed and ABG results within normal limits. MD notified. Pt extubated per MD order to 3 L nasal cannula. SpO2 97%, respiratory effort unlabored, and pt tolerating well. Will continue to monitor closely.

## 2019-04-06 NOTE — H&P
[facility]  Critical Care - Surgery  History & Physical      Code Status: Full Code     SURGERY/PROCEDURE: Procedure(s) (LRB):  BENTALL PROCEDURE (N/A)    CC: S/P aortic valve repair    SUBJECTIVE:     History of Present Illness:  Patient is a 59 y.o. male w/ a significant PMHx of of HTN, AI, and ascending aortic aneurysm (max diameter of ascending aorta is 4.3 cm) who underwent Bentall Procedure on 04/05. Intraoperatively,  ml.  He arrived to the SICU intubated. Upon arrival, his chest tube put out 1250 cc if blood.  He was transfused a total of 3u PRBC, 4 FFP, and 2 PLT.     PTA Medications   Medication Sig    carvedilol (COREG) 6.25 MG tablet TAKE 1 TABLET(6.25 MG) BY MOUTH TWICE DAILY    losartan (COZAAR) 50 MG tablet TAKE 1 TABLET(50 MG) BY MOUTH EVERY DAY    furosemide (LASIX) 40 MG tablet TAKE 1 TABLET(40 MG) BY MOUTH EVERY DAY       Continuous Infusions:    sodium chloride 0.9% Stopped (04/05/19 0745)    dextrose 5 % and 0.45 % NaCl with KCl 20 mEq      epinephrine infusion      insulin (HUMAN R) infusion (adults) Stopped (04/05/19 1900)    nicardipine Stopped (04/05/19 2000)    propofol 25 mcg/kg/min (04/05/19 2000)       Scheduled Meds:    albuterol-ipratropium  3 mL Nebulization Q4H    aspirin  325 mg Per NG tube Once    aspirin  325 mg Oral Daily    ceFAZolin (ANCEF) IVPB  2 g Intravenous Q8H    docusate sodium  100 mg Oral BID    mupirocin  1 g Nasal BID    pantoprazole  40 mg Intravenous Daily    polyethylene glycol  17 g Oral Daily       PRN Meds: sodium chloride, sodium chloride, acetaminophen, albuterol-ipratropium, aspirin, bisacodyl, dextrose 50%, dextrose 50%, fentaNYL, fentaNYL **FOLLOWED BY** [START ON 4/7/2019] fentaNYL, magnesium sulfate IVPB, magnesium sulfate IVPB, metoclopramide HCl, ondansetron, oxyCODONE, oxyCODONE, potassium chloride in water **AND** potassium chloride in water **AND** potassium chloride in water, sodium chloride 0.9%,  sodium phosphate IVPB, sodium phosphate IVPB, sodium phosphate IVPB    Review of patient's allergies indicates:  No Known Allergies    Past Medical History:   Diagnosis Date    Hypertension     Murmur      Past Surgical History:   Procedure Laterality Date    CATHETERIZATION, HEART, BOTH LEFT AND RIGHT Bilateral 2/8/2019    Per[facility] C[facility]    CERVICAL SPINE SURGERY      HEART CATH-RIGHT & LEFT Right 2/27/2018    Performed by [facility] at [facility] CATH LAB    SPINE SURGERY       Family History   Problem Relation Age of Onset    Heart disease Mother     Cancer Father     Kidney disease Father     Hypertension Father      Social History     Tobacco Use    Smoking status: Former Smoker     Packs/day: 0.50     Years: 40.00     Pack years: 20.00     Types: Cigarettes     Start date: 1/15/2018    Smokeless tobacco: Never Used   Substance Use Topics    Alcohol use: Yes     Alcohol/week: 8.4 oz     Types: 14 Shots of liquor per week    Drug use: No            OBJECTIVE:     Vital Signs (Most Recent)  Temp: (!) 101 °F (38.3 °C) (04/05/19 2000)  Pulse: 93 (04/05/19 2000)  Resp: 14 (04/05/19 2000)  BP: 112/68 (04/05/19 2000)  SpO2: (!) 82 % (04/05/19 2000)    Ventilator Data (Last 24H):     Vent Mode: A/C  Oxygen Concentration (%):  [] 100  Resp Rate Total:  [14 br/min-28 br/min] 26 br/min  Vt Set:  [400 mL] 400 mL  PEEP/CPAP:  [5 cmH20] 5 cmH20  Mean Airway Pressure:  [7.3 cmH20-8.6 cmH20] 8.6 cmH20    Hemodynamic Parameters (Last 24H):  PAP: (25-42)/(10-23) 29/13  PAP (Mean):  [17 mmHg-31 mmHg] 20 mmHg  CO:  [7.1 L/min-7.3 L/min] 7.3 L/min    Physical Exam:  Physical Exam   Constitutional: He is well-developed, well-nourished, and in no distress.   HENT:   Head: Normocephalic and atraumatic.   Neck: Neck supple.   Cardiovascular: Normal rate and regular rhythm.   Pulmonary/Chest: No respiratory distress. He has no wheezes.   Intubated     Abdominal: Soft. He  exhibits no distension.   Neurological:   sedated   Skin: Skin is warm and dry.           Lines/Drains:       Pulmonary Artery Catheter Assessment  04/05/19 0745 (Active)   Number of days: 0            Peripheral IV - Single Lumen 04/05/19 0550 Right Forearm (Active)   Site Assessment Clean;Dry;Intact;No redness;No swelling 4/5/2019  5:54 AM   Line Status Infusing 4/5/2019  5:54 AM   Dressing Status Clean;Intact;Dry 4/5/2019  5:54 AM   Reason Not Rotated Not due 4/5/2019  5:54 AM   Number of days: 0            Peripheral IV - Single Lumen 04/05/19 0745 Left Upper Arm (Active)   Number of days: 0            Arterial Line 04/05/19 0710 Left Radial (Active)   Site Assessment Clean;Intact 4/5/2019  2:00 PM   Number of days: 0            Pacer Wires 04/05/19 1318 (Active)   Number of days: 0            Urethral Catheter 04/05/19 0735 Non-latex;Temperature probe 14 Fr. (Active)   Output (mL) 160 mL 4/5/2019  8:00 PM   Number of days: 0            Y Chest Tube 1 and 2 04/05/19 1111 1 Anterior Mediastinal 19 Fr. Posterior Mediastinal 19 Fr. (Active)   Output (mL) 10 mL 4/5/2019  8:00 PM   Number of days: 0       Laboratory  ABG:   Recent Labs   Lab 04/05/19  1516   PH 7.387   PO2 514*   PCO2 46.3*   HCO3 27.9   POCSATURATED 100   BE 3     BMP:  Recent Labs   Lab 04/05/19  1518      K 4.4      CO2 26   BUN 13   CREATININE 0.9   *   MG 2.2   PHOS 3.4     LFT:   Lab Results   Component Value Date    AST 25 04/04/2019    ALT 23 04/04/2019    ALKPHOS 72 04/04/2019    BILITOT 1.7 (H) 04/04/2019    ALBUMIN 4.2 04/04/2019    PROT 7.7 04/04/2019     CBC:   Lab Results   Component Value Date    WBC 7.03 04/05/2019    HGB 7.6 (L) 04/05/2019    HCT 22.5 (L) 04/05/2019    MCV 98 04/05/2019    PLT 50 (L) 04/05/2019     Microbiology last 7d:   Microbiology Results (last 7 days)     ** No results found for the last 168 hours. **          Imaging/Diagnostic Results:    ASSESSMENT/PLAN:   59 y.o. male w/ a significant PMHx  of HTN, AI, and ascending aortic aneurysm (max diameter of ascending aorta is 4.3 cm) who underwent Bentall Procedure on 04/05.     Neuro:   - Sedated on propofol  - PRN pain medications    Pulmonary:   - Intubated  - Daily CXR and ABG  - Scheduled duonebs    Recent Labs     04/05/19  1516   PH 7.387   PCO2 46.3*   PO2 514*   HCO3 27.9   POCSATURATED 100   BE 3       Cardiac:   - Epi and Norepi gtts weaned off.   - Cardene gtt @ 2.5mg/h to maintain map<80      Renal:    - Vazquez in place  - Monitor UOP  - BUN/Cr wnl      Intake/Output Summary (Last 24 hours) at 4/5/2019 2015  Last data filed at 4/5/2019 2000  Gross per 24 hour   Intake 8770 ml   Output 3360 ml   Net 5410 ml       ID:   - No leukocytosis  - Ancef post op per protocol  - Cont to monitor    Hem/Onc:   - Chest tube put out approx 1500 cc upon arrival to SICU  - Transfused 3u PRBC, 4 FFP, and 2 PLT  - Will continue to monitor    Endocrine:    - Insulin gtt  - Monitor BG     Fluids/Electrolytes/Nutrition/GI:   - Replace electrolytes PRN    PPx: per Primary       DISPO:  Cont SICU care    Critical care was time spent personally by me on the following activities: development of treatment plan with patient or surrogate and bedside caregivers, discussions with consultants, evaluation of patient's response to treatment, examination of patient, ordering and performing treatments and interventions, ordering and review of laboratory studies, ordering and review of radiographic studies, pulse oximetry, re-evaluation of patient's condition. This critical care time did not overlap with that of any other provider or involve time for any procedures.    Erika Restrepo CA1

## 2019-04-06 NOTE — ASSESSMENT & PLAN NOTE
SIGNOFF  BG goal 140 - 180     BG is below goal without the need for insulin therapy. No dx of DM. Tolerating PO foods.   Discontinue BG monitoring. Will sign off. Please re-consult Endo as needed.     Thank you for the consult.     ** Please call Endocrine for any BG related issues **

## 2019-04-06 NOTE — HPI
Patient is a 59 y.o. male w/ a significant PMHx of of HTN, AI, and ascending aortic aneurysm (max diameter of ascending aorta is 4.3 cm) who underwent Bentall Procedure on 04/05. Intraoperatively,  ml.  He arrived to the SICU intubated. Upon arrival, his chest tube put out 1250 cc if blood.  He was transfused a total of 3u PRBC, 4 FFP, and 2 PLT.

## 2019-04-06 NOTE — SUBJECTIVE & OBJECTIVE
"Interval HPI:   Overnight events:   NAEON. BG remains within goal without the need for SQ insulin therapy.     Eatin%  Nausea: No  Hypoglycemia and intervention: No  Fever: No  TPN and/or TF: No    If yes, type of TF/TPN and rate: None    /71   Pulse 96   Temp (!) 100.5 °F (38.1 °C) (Core ([place]))   Resp (!) 21   Ht 5' 11" (1.803 m)   Wt 77.3 kg (170 lb 6.7 oz)   SpO2 98%   BMI 23.77 kg/m²     Labs Reviewed and Include    Recent Labs   Lab 19  2201 19  0332 19  0845    113*  113*  --    CALCIUM 8.4* 8.1*  8.1*  --    ALBUMIN 3.3*  --   --    PROT 5.4*  --   --     142  142  --    K 3.9 4.4  4.4 4.4   CO2 28 28  28  --     107  107  --    BUN 13 13  13  --    CREATININE 0.9 1.1  1.1  --    ALKPHOS 46*  --   --    ALT 16  --   --    AST 37  --   --    BILITOT 3.4*  --   --      Lab Results   Component Value Date    WBC 5.91 2019    WBC 5.91 2019    HGB 8.9 (L) 2019    HGB 8.9 (L) 2019    HCT 26.3 (L) 2019    HCT 26.3 (L) 2019    MCV 95 2019    MCV 95 2019    PLT 73 (L) 2019    PLT 73 (L) 2019     No results for input(s): TSH, FREET4 in the last 168 hours.  Lab Results   Component Value Date    HGBA1C 5.5 2019       Nutritional status:   Body mass index is 23.77 kg/m².  Lab Results   Component Value Date    ALBUMIN 3.3 (L) 2019    ALBUMIN 4.2 2019    ALBUMIN 4.0 2018     No results found for: PREALBUMIN    Estimated Creatinine Clearance: 76.1 mL/min (based on SCr of 1.1 mg/dL).    Accu-Checks  Recent Labs     19  2158 19  2316 19  0011 19  0103 19  0151 19  0312 19  0413 19  0508 19  0545 19  0846   POCTGLUCOSE 108 110 111* 111* 108 120* 112* 103 108 127*       Current Medications and/or Treatments Impacting Glycemic Control  Immunotherapy:    Immunosuppressants     None        Steroids:   Hormones (From " admission, onward)    None        Pressors:    Autonomic Drugs (From admission, onward)    None        Hyperglycemia/Diabetes Medications:   Antihyperglycemics (From admission, onward)    Start     Stop Route Frequency Ordered    04/05/19 1530  insulin regular (Humulin R) 100 Units in sodium chloride 0.9% 100 mL infusion     Question:  Insulin rate changes (DO NOT MODIFY ANSWER)  Answer:  [email or web address]\Images\Pharmacy\InsulinInfusions\CTS INSULIN [id number]    -- IV Continuous 04/05/19 1515

## 2019-04-06 NOTE — CONSULTS
[facility]  Endocrinology  Diabetes Consult Note    Consult Requested by: Yuri Burks MD   Reason for admit: S/P aortic valve repair    HISTORY OF PRESENT ILLNESS:  Reason for Consult: Management of Hyperglycemia     Surgical Procedure and Date: 04/05/2019 - Bentall Procedure.    HPI:   Patient is a 59 y.o. male with a diagnosis of HTN and AI. Is now S/P Bentall Procedure. No history of DM noted on file. Endocrinology consulted to manage hyperglycemia s/p cardiac procedure.     Lab Results   Component Value Date    HGBA1C 5.5 04/04/2019       Interval HPI:   Overnight events:    NAEON. Sedated and intubated in SICU. BG is within goal on intensive IV insulin infusion.     Eating:   NPO  Nausea: No  Hypoglycemia and intervention: No  Fever: No  TPN and/or TF: No  If yes, type of TF/TPN and rate: None    PMH, PSH, FH, SH reviewed     ROS:  Unable to obtain due to: Sedation,Intubation,Altered mental status,Critical illness,Reviewed ROS from note dated 04/05/2019 per Freddy Magallon MD    Current Medications and/or Treatments Impacting Glycemic Control  Immunotherapy:    Immunosuppressants     None        Steroids:   Hormones (From admission, onward)    None        Pressors:    Autonomic Drugs (From admission, onward)    Start     Stop Route Frequency Ordered    04/05/19 1530  EPINEPHrine (ADRENALIN) 5 mg in sodium chloride 0.9% 250 mL infusion     Question Answer Comment   Titrate by: (in mcg/kg/min) 0.02    Titrate interval: (in minutes) 5    Titrate to maintain: (SBP or MAP or Cardiac Index) MAP    Greater than: (in mmHg) 65    Cardiac index greater than: (in L/min) 2.2    Maximum dose: (in mcg/kg/min) 2        -- IV Continuous 04/05/19 1517        Hyperglycemia/Diabetes Medications:   Antihyperglycemics (From admission, onward)    Start     Stop Route Frequency Ordered    04/05/19 1530  insulin regular (Humulin R) 100 Units in sodium chloride 0.9% 100 mL infusion     Question:  Insulin rate  changes (DO NOT MODIFY ANSWER)  Answer:  \\[email or web address]\Images\Pharmacy\InsulinInfusions\CTS INSULIN [id number].pdf    -- IV Continuous 04/05/19 1517             PHYSICAL EXAMINATION:  Vitals:    04/05/19 1545   BP:    Pulse: 84   Resp: 18   Temp:      Body mass index is 20.45 kg/m².    Physical Exam   Constitutional: Well developed, well nourished, NAD. Sedated.  ENT: External ears no masses with nose patent  Neck: Supple; trachea midline; no thyromegaly.  Cardiovascular: Heart Sounds present. No LE edema.   Lungs: lungs anterior bilaterally clear to auscultation. Intubated on ventilator.  Abdomen: Soft, no masses, no hernias.  MS: No clubbing or cyanosis of nails noted unable to assess gait.  Skin: No rashes, lesions, or ulcers; no nodules. Mid-sternal incision with dressing; chest tubes x2.   Foot: nails in good condition, no amputations noted.        Labs Reviewed and Include   Recent Labs   Lab 04/05/19  1518   *   CALCIUM 7.8*      K 4.4   CO2 26      BUN 13   CREATININE 0.9     Lab Results   Component Value Date    WBC 7.03 04/05/2019    HGB 7.6 (L) 04/05/2019    HCT 22.5 (L) 04/05/2019    MCV 98 04/05/2019    PLT 50 (L) 04/05/2019     No results for input(s): TSH, FREET4 in the last 168 hours.  Lab Results   Component Value Date    HGBA1C 5.5 04/04/2019       Nutritional status:   Body mass index is 20.45 kg/m².  Lab Results   Component Value Date    ALBUMIN 4.2 04/04/2019    ALBUMIN 4.0 02/25/2018    ALBUMIN 4.3 06/25/2012     No results found for: PREALBUMIN    Estimated Creatinine Clearance: 82.1 mL/min (based on SCr of 0.9 mg/dL).    Accu-Checks  Recent Labs     04/05/19  1520 04/05/19  1700 04/05/19  1808 04/05/19  1930   POCTGLUCOSE 187* 142* 108 83        ASSESSMENT and PLAN    * S/P aortic valve repair  Managed per primary team  Avoid hypoglycemia        Stress hyperglycemia  BG goal 140 - 180     Continue IV insulin infusion protocol  Requires intensive BG monitoring while on  protocol     Discharge planning: TBD            Plan discussed with Resident and RN at bedside.     Garry Murphy NP  Endocrinology  [facility]

## 2019-04-07 LAB
ANION GAP SERPL CALC-SCNC: 6 MMOL/L (ref 8–16)
ANION GAP SERPL CALC-SCNC: 6 MMOL/L (ref 8–16)
APTT BLDCRRT: 26.2 SEC (ref 21–32)
BASOPHILS # BLD AUTO: 0.01 K/UL (ref 0–0.2)
BASOPHILS # BLD AUTO: 0.01 K/UL (ref 0–0.2)
BASOPHILS NFR BLD: 0.1 % (ref 0–1.9)
BASOPHILS NFR BLD: 0.1 % (ref 0–1.9)
BUN SERPL-MCNC: 18 MG/DL (ref 6–20)
BUN SERPL-MCNC: 18 MG/DL (ref 6–20)
CALCIUM SERPL-MCNC: 8.9 MG/DL (ref 8.7–10.5)
CALCIUM SERPL-MCNC: 8.9 MG/DL (ref 8.7–10.5)
CHLORIDE SERPL-SCNC: 104 MMOL/L (ref 95–110)
CHLORIDE SERPL-SCNC: 104 MMOL/L (ref 95–110)
CO2 SERPL-SCNC: 27 MMOL/L (ref 23–29)
CO2 SERPL-SCNC: 27 MMOL/L (ref 23–29)
CREAT SERPL-MCNC: 0.9 MG/DL (ref 0.5–1.4)
CREAT SERPL-MCNC: 0.9 MG/DL (ref 0.5–1.4)
DIFFERENTIAL METHOD: ABNORMAL
DIFFERENTIAL METHOD: ABNORMAL
EOSINOPHIL # BLD AUTO: 0 K/UL (ref 0–0.5)
EOSINOPHIL # BLD AUTO: 0 K/UL (ref 0–0.5)
EOSINOPHIL NFR BLD: 0 % (ref 0–8)
EOSINOPHIL NFR BLD: 0 % (ref 0–8)
ERYTHROCYTE [DISTWIDTH] IN BLOOD BY AUTOMATED COUNT: 13.8 % (ref 11.5–14.5)
ERYTHROCYTE [DISTWIDTH] IN BLOOD BY AUTOMATED COUNT: 13.8 % (ref 11.5–14.5)
EST. GFR  (AFRICAN AMERICAN): >60 ML/MIN/1.73 M^2
EST. GFR  (AFRICAN AMERICAN): >60 ML/MIN/1.73 M^2
EST. GFR  (NON AFRICAN AMERICAN): >60 ML/MIN/1.73 M^2
EST. GFR  (NON AFRICAN AMERICAN): >60 ML/MIN/1.73 M^2
GLUCOSE SERPL-MCNC: 145 MG/DL (ref 70–110)
GLUCOSE SERPL-MCNC: 145 MG/DL (ref 70–110)
HCT VFR BLD AUTO: 26.1 % (ref 40–54)
HCT VFR BLD AUTO: 26.1 % (ref 40–54)
HGB BLD-MCNC: 8.8 G/DL (ref 14–18)
HGB BLD-MCNC: 8.8 G/DL (ref 14–18)
IMM GRANULOCYTES # BLD AUTO: 0.04 K/UL (ref 0–0.04)
IMM GRANULOCYTES # BLD AUTO: 0.04 K/UL (ref 0–0.04)
IMM GRANULOCYTES NFR BLD AUTO: 0.4 % (ref 0–0.5)
IMM GRANULOCYTES NFR BLD AUTO: 0.4 % (ref 0–0.5)
INR PPP: 1 (ref 0.8–1.2)
LYMPHOCYTES # BLD AUTO: 0.5 K/UL (ref 1–4.8)
LYMPHOCYTES # BLD AUTO: 0.5 K/UL (ref 1–4.8)
LYMPHOCYTES NFR BLD: 5.7 % (ref 18–48)
LYMPHOCYTES NFR BLD: 5.7 % (ref 18–48)
MAGNESIUM SERPL-MCNC: 2.2 MG/DL (ref 1.6–2.6)
MAGNESIUM SERPL-MCNC: 2.2 MG/DL (ref 1.6–2.6)
MCH RBC QN AUTO: 31.9 PG (ref 27–31)
MCH RBC QN AUTO: 31.9 PG (ref 27–31)
MCHC RBC AUTO-ENTMCNC: 33.7 G/DL (ref 32–36)
MCHC RBC AUTO-ENTMCNC: 33.7 G/DL (ref 32–36)
MCV RBC AUTO: 95 FL (ref 82–98)
MCV RBC AUTO: 95 FL (ref 82–98)
MONOCYTES # BLD AUTO: 0.9 K/UL (ref 0.3–1)
MONOCYTES # BLD AUTO: 0.9 K/UL (ref 0.3–1)
MONOCYTES NFR BLD: 9.9 % (ref 4–15)
MONOCYTES NFR BLD: 9.9 % (ref 4–15)
NEUTROPHILS # BLD AUTO: 7.9 K/UL (ref 1.8–7.7)
NEUTROPHILS # BLD AUTO: 7.9 K/UL (ref 1.8–7.7)
NEUTROPHILS NFR BLD: 83.9 % (ref 38–73)
NEUTROPHILS NFR BLD: 83.9 % (ref 38–73)
NRBC BLD-RTO: 0 /100 WBC
NRBC BLD-RTO: 0 /100 WBC
PHOSPHATE SERPL-MCNC: 3 MG/DL (ref 2.7–4.5)
PHOSPHATE SERPL-MCNC: 3 MG/DL (ref 2.7–4.5)
PLATELET # BLD AUTO: 80 K/UL (ref 150–350)
PLATELET # BLD AUTO: 80 K/UL (ref 150–350)
PMV BLD AUTO: 10.6 FL (ref 9.2–12.9)
PMV BLD AUTO: 10.6 FL (ref 9.2–12.9)
POCT GLUCOSE: 129 MG/DL (ref 70–110)
POCT GLUCOSE: 131 MG/DL (ref 70–110)
POCT GLUCOSE: 137 MG/DL (ref 70–110)
POCT GLUCOSE: 152 MG/DL (ref 70–110)
POCT GLUCOSE: 170 MG/DL (ref 70–110)
POTASSIUM SERPL-SCNC: 3.9 MMOL/L (ref 3.5–5.1)
POTASSIUM SERPL-SCNC: 3.9 MMOL/L (ref 3.5–5.1)
POTASSIUM SERPL-SCNC: 4 MMOL/L (ref 3.5–5.1)
POTASSIUM SERPL-SCNC: 4.2 MMOL/L (ref 3.5–5.1)
PROTHROMBIN TIME: 10.6 SEC (ref 9–12.5)
RBC # BLD AUTO: 2.76 M/UL (ref 4.6–6.2)
RBC # BLD AUTO: 2.76 M/UL (ref 4.6–6.2)
SODIUM SERPL-SCNC: 137 MMOL/L (ref 136–145)
SODIUM SERPL-SCNC: 137 MMOL/L (ref 136–145)
WBC # BLD AUTO: 9.4 K/UL (ref 3.9–12.7)
WBC # BLD AUTO: 9.4 K/UL (ref 3.9–12.7)

## 2019-04-07 PROCEDURE — 25000003 PHARM REV CODE 250: Performed by: STUDENT IN AN ORGANIZED HEALTH CARE EDUCATION/TRAINING PROGRAM

## 2019-04-07 PROCEDURE — 85610 PROTHROMBIN TIME: CPT

## 2019-04-07 PROCEDURE — 84100 ASSAY OF PHOSPHORUS: CPT

## 2019-04-07 PROCEDURE — 85025 COMPLETE CBC W/AUTO DIFF WBC: CPT

## 2019-04-07 PROCEDURE — C9113 INJ PANTOPRAZOLE SODIUM, VIA: HCPCS | Performed by: STUDENT IN AN ORGANIZED HEALTH CARE EDUCATION/TRAINING PROGRAM

## 2019-04-07 PROCEDURE — 63600175 PHARM REV CODE 636 W HCPCS: Performed by: STUDENT IN AN ORGANIZED HEALTH CARE EDUCATION/TRAINING PROGRAM

## 2019-04-07 PROCEDURE — 83735 ASSAY OF MAGNESIUM: CPT

## 2019-04-07 PROCEDURE — 20000000 HC ICU ROOM

## 2019-04-07 PROCEDURE — 97165 OT EVAL LOW COMPLEX 30 MIN: CPT

## 2019-04-07 PROCEDURE — 27000221 HC OXYGEN, UP TO 24 HOURS

## 2019-04-07 PROCEDURE — 84132 ASSAY OF SERUM POTASSIUM: CPT | Mod: 91

## 2019-04-07 PROCEDURE — 85730 THROMBOPLASTIN TIME PARTIAL: CPT

## 2019-04-07 PROCEDURE — 94761 N-INVAS EAR/PLS OXIMETRY MLT: CPT

## 2019-04-07 PROCEDURE — 80048 BASIC METABOLIC PNL TOTAL CA: CPT

## 2019-04-07 PROCEDURE — 97535 SELF CARE MNGMENT TRAINING: CPT

## 2019-04-07 RX ORDER — CARVEDILOL 6.25 MG/1
6.25 TABLET ORAL 2 TIMES DAILY
Status: DISCONTINUED | OUTPATIENT
Start: 2019-04-07 | End: 2019-04-10

## 2019-04-07 RX ORDER — CARVEDILOL 3.12 MG/1
3.12 TABLET ORAL ONCE
Status: COMPLETED | OUTPATIENT
Start: 2019-04-07 | End: 2019-04-07

## 2019-04-07 RX ORDER — ATORVASTATIN CALCIUM 20 MG/1
40 TABLET, FILM COATED ORAL DAILY
Status: DISCONTINUED | OUTPATIENT
Start: 2019-04-08 | End: 2019-04-10 | Stop reason: HOSPADM

## 2019-04-07 RX ORDER — POTASSIUM CHLORIDE 20 MEQ/1
20 TABLET, EXTENDED RELEASE ORAL ONCE
Status: COMPLETED | OUTPATIENT
Start: 2019-04-07 | End: 2019-04-07

## 2019-04-07 RX ORDER — PANTOPRAZOLE SODIUM 40 MG/1
40 TABLET, DELAYED RELEASE ORAL DAILY
Status: DISCONTINUED | OUTPATIENT
Start: 2019-04-08 | End: 2019-04-10 | Stop reason: HOSPADM

## 2019-04-07 RX ADMIN — DOCUSATE SODIUM 100 MG: 100 CAPSULE, LIQUID FILLED ORAL at 08:04

## 2019-04-07 RX ADMIN — FUROSEMIDE 40 MG: 10 INJECTION, SOLUTION INTRAMUSCULAR; INTRAVENOUS at 08:04

## 2019-04-07 RX ADMIN — PANTOPRAZOLE SODIUM 40 MG: 40 INJECTION, POWDER, LYOPHILIZED, FOR SOLUTION INTRAVENOUS at 08:04

## 2019-04-07 RX ADMIN — CARVEDILOL 3.12 MG: 3.12 TABLET, FILM COATED ORAL at 08:04

## 2019-04-07 RX ADMIN — POLYETHYLENE GLYCOL 3350 17 G: 17 POWDER, FOR SOLUTION ORAL at 08:04

## 2019-04-07 RX ADMIN — MUPIROCIN 1 G: 20 OINTMENT TOPICAL at 08:04

## 2019-04-07 RX ADMIN — NICARDIPINE HYDROCHLORIDE 2.5 MG/HR: 0.2 INJECTION, SOLUTION INTRAVENOUS at 11:04

## 2019-04-07 RX ADMIN — ASPIRIN 325 MG ORAL TABLET 325 MG: 325 PILL ORAL at 08:04

## 2019-04-07 RX ADMIN — OXYCODONE HYDROCHLORIDE 10 MG: 10 TABLET ORAL at 08:04

## 2019-04-07 RX ADMIN — HYDRALAZINE HYDROCHLORIDE 10 MG: 20 INJECTION INTRAMUSCULAR; INTRAVENOUS at 11:04

## 2019-04-07 RX ADMIN — ATORVASTATIN CALCIUM 10 MG: 10 TABLET, FILM COATED ORAL at 08:04

## 2019-04-07 RX ADMIN — CEFAZOLIN 2 G: 330 INJECTION, POWDER, FOR SOLUTION INTRAMUSCULAR; INTRAVENOUS at 04:04

## 2019-04-07 RX ADMIN — CARVEDILOL 3.12 MG: 3.12 TABLET, FILM COATED ORAL at 01:04

## 2019-04-07 RX ADMIN — OXYCODONE HYDROCHLORIDE 5 MG: 5 TABLET ORAL at 07:04

## 2019-04-07 RX ADMIN — POTASSIUM CHLORIDE 20 MEQ: 1500 TABLET, EXTENDED RELEASE ORAL at 05:04

## 2019-04-07 RX ADMIN — FUROSEMIDE 40 MG: 10 INJECTION, SOLUTION INTRAMUSCULAR; INTRAVENOUS at 02:04

## 2019-04-07 RX ADMIN — CARVEDILOL 6.25 MG: 6.25 TABLET, FILM COATED ORAL at 08:04

## 2019-04-07 NOTE — PT/OT/SLP EVAL
Occupational Therapy   Evaluation    Name: Devin Lopez  MRN: [id number]  Admitting Diagnosis:  S/P aortic valve repair 2 Days Post-Op    Recommendations:     Discharge Recommendations: home  Discharge Equipment Recommendations:  none  Barriers to discharge:  None    Assessment:     Devin Lopez is a 59 y.o. male with a medical diagnosis of S/P aortic valve repair.  Performance deficits affecting function: weakness, impaired self care skills, impaired balance, impaired functional mobilty, impaired endurance, decreased upper extremity function, impaired cardiopulmonary response to activity.      Rehab Prognosis: Good; patient would benefit from acute skilled OT services to address these deficits and reach maximum level of function.       Plan:     Patient to be seen 4 x/week to address the above listed problems via self-care/home management, therapeutic activities, therapeutic exercises  · Plan of Care Expires: 05/07/19  · Plan of Care Reviewed with: patient    Subjective     Chief Complaint: denies  Patient/Family Comments/goals: to get better and go home    Occupational Profile:  Living Environment: lives with spouse in [facility] with no CYNTHIA  Previous level of function: (I) with ADL and ambulation  Roles and Routines:  at [facility] [place]  Equipment Used at Home:  none  Assistance upon Discharge: wife can assist    Pain/Comfort:  · Pain Rating 1: 0/10  · Pain Rating Post-Intervention 1: 0/10    Patients cultural, spiritual, [employer] conflicts given the current situation: no    Objective:     Communicated with: RN prior to session.  Patient found up in chair with pulse ox (continuous), telemetry, blood pressure cuff, oxygen, chest tube, jackson catheter upon OT entry to room.    General Precautions: Standard, fall, sternal   Orthopedic Precautions:    Braces:       Occupational Performance:    Bed Mobility:    · NT    Functional Mobility/Transfers:  · Patient completed Sit <> Stand Transfer with contact guard  assistance  with  no assistive device   · Functional Mobility: CGA to/from bathroom    Activities of Daily Living:  · Feeding:  modified independence    · Grooming: contact guard assistance standing at sink  · Upper Body Dressing: minimum assistance    · Lower Body Dressing: minimum assistance      Cognitive/Visual Perceptual:  Oriented to: Person, Place, Time and Situation  Follows Commands/attention: Follows multistep  commands  Communication: clear/fluent  Memory:  No Deficits noted  Safety awareness/insight to disability: intact  Coping skills/emotional control: Appropriate to situation    Physical Exam:  Postural examination/scapula alignment:    -       No postural abnormalities identified  Sensation:    -       Intact  Upper Extremity Range of Motion:     -       Right Upper Extremity: WFL  -       Left Upper Extremity: WFL  Upper Extremity Strength:    -       Right Upper Extremity: WFL  -       Left Upper Extremity: WFL   Strength:    -       Right Upper Extremity: WFL  -       Left Upper Extremity: WFL  Fine Motor Coordination:    -       Intact  Gross motor coordination:   WFL    AMPAC 6 Click ADL:  AMPAC Total Score: 17    Treatment & Education:  Pt ed on OT POC  Pt ed on sternal precautions during ADL; handout provided  Education:    Patient left up in chair with all lines intact, call button in reach and RN notified    GOALS:   Multidisciplinary Problems     Occupational Therapy Goals        Problem: Occupational Therapy Goal    Goal Priority Disciplines Outcome Interventions   Occupational Therapy Goal     OT, PT/OT Ongoing (interventions implemented as appropriate)    Description:  Goals to be met by: 4/14/19     Patient will increase functional independence with ADLs by performing:    UE Dressing with Supervision.  LE Dressing with Supervision.  Grooming while standing at sink with Supervision.  Toileting from toilet with Supervision for hygiene and clothing management.   Toilet transfers with  Supervision.                      History:     Past Medical History:   Diagnosis Date    Hypertension     Murmur        Past Surgical History:   Procedure Laterality Date    CATHETERIZATION, HEART, BOTH LEFT AND RIGHT Bilateral 2/8/2019    Performed by Jarred [facility]con [facility] at [facility] CATH LAB    CERVICAL SPINE SURGERY      HEART CATH-RIGHT & LEFT Right 2/27/2018    Performed by Jarred Al[facility]con MD at [facility] CATH LAB    SPINE SURGERY         Time Tracking:     OT Date of Treatment: 04/07/19  OT Start Time: 1112  OT Stop Time: 1131  OT Total Time (min): 19 min    Billable Minutes:Evaluation 10  Self Care/Home Management 9    SERGIO Esqueda  4/7/2019

## 2019-04-07 NOTE — ASSESSMENT & PLAN NOTE
59 y.o. male w/ a significant PMHx of HTN, AI, and ascending aortic aneurysm (max diameter of ascending aorta is 4.3 cm) who underwent Bentall Procedure on 04/05. Extubated 4/6/19.     Neuro:   - fully oriented  - PRN pain medications, not requesting     Pulmonary:   - extubated 4/6  - 1L NC  - CXR unchanged     Cardiac:   - S/p Bentall Procedure on 04/05  - Epi and Norepi gtts weaned off.   - Cardene gtt @ 2.5mg/h to maintain map <80, turned off this morning     Renal:    - Vazquez in place  - Monitor UOP - 80-200m/hr with furosemide 40 TID  - BUN/Cr wnl       Intake/Output Summary (Last 24 hours) at 4/7/2019 0651  Last data filed at 4/7/2019 0600  Gross per 24 hour   Intake 1380 ml   Output 4110 ml   Net -2730 ml       ID:   - Febrile overnight Tmax 100.7  - No leukocytosis, will continue to monitor   - Ancef post op per protocol     Hem/Onc:   - Chest tube put out approx 1500 cc upon arrival to SICU, but now significantly decreased 5-10ml/hr  - H/H stable at this time, Hgb 8.8  - Will continue to monitor     Endocrine:    - low dose SSI  - Monitor BG      Fluids/Electrolytes/Nutrition/GI:   - Replace electrolytes PRN     PPx:  pantoprazole    DISPO:  SICU  Primary: CTS

## 2019-04-07 NOTE — ASSESSMENT & PLAN NOTE
58 yo male s/p Bentall procedure on 4/5     Plan:  Neuro:   -Awake and alert.   -Pain control: prn oxycodone, prn dilaudid      Pulmonary:   -Extubated on 4/6  -On 1L NC wean as tolerated  -CXR daily   -IS, duonebs  -CT in place     Cardiac:  -MAP: 60-80 goal  -Cardene currently off. PRN bp medications started.   -Not requiring pressor support   -CT with ss output, will monitor output. Remove tomorrow if output still low  -ASA, statin   -Coreg 6.25 BID  -prn hydralazine     FEN/GI:  -Cardiac diet advance   -Bowel regimen: miralax, docusate  -Protonix 40mg daily      /Renal:   - Vazquez: in place  - BUN/Cr: stable, will trend  - UOP: 3575cc since admission to sicu   -lasix 40 bid     Infectious Disease:   -Afebrile  -WBC: 9.4, will trend   -Antibiotics:ancef, post operative dose   -Cultures: none      Hematology/Oncology:  -H/H: 8.8/26.1, will trend   -required no blood products overnight   - ASA      Endocrine:  -BG: insulin gtt      Dispo:CTS primary. Transfer to CTSU today

## 2019-04-07 NOTE — PROGRESS NOTES
[facility]  Critical Care - Surgery  Progress Note    Patient Name: Devin Lopez  MRN: [id number]  Admission Date: 4/5/2019  Hospital Length of Stay: 2 days  Code Status: Full Code  Attending Provider: Yuri Burks MD  Primary Care Provider: Gage Jaimes Jr, MD (Inactive)   Principal Problem: S/P aortic valve repair    Subjective:     Hospital/ICU Course:  No notes on file    Interval History/Significant Events: no events overnight    Follow-up For: Procedure(s) (LRB):  BENTALL PROCEDURE (N/A)    Post-Operative Day: 2 Days Post-Op    Objective:     Vital Signs (Most Recent):  Temp: 99.2 °F (37.3 °C) (04/07/19 0300)  Pulse: 78 (04/07/19 0630)  Resp: (!) 30 (04/07/19 0630)  BP: (!) 108/59 (04/07/19 0630)  SpO2: (!) 93 % (04/07/19 0630) Vital Signs (24h Range):  Temp:  [99.2 °F (37.3 °C)-100.7 °F (38.2 °C)] 99.2 °F (37.3 °C)  Pulse:  [] 78  Resp:  [14-34] 30  SpO2:  [92 %-100 %] 93 %  BP: ()/(59-81) 108/59  Arterial Line BP: ()/(41-68) 105/54     Weight: 76.9 kg (169 lb 8.5 oz)  Body mass index is 23.65 kg/m².      Intake/Output Summary (Last 24 hours) at 4/7/2019 0650  Last data filed at 4/7/2019 0600  Gross per 24 hour   Intake 1380 ml   Output 4110 ml   Net -2730 ml       Physical Exam   Constitutional: He is oriented to person, place, and time. He appears well-developed and well-nourished. No distress.   Cardiovascular: Normal rate and regular rhythm.   Midline incision covered with island dressing. CT in place with ss output    Pulmonary/Chest: Effort normal. No respiratory distress.   1 L NC   Abdominal: Soft. He exhibits no distension. There is no tenderness.   Genitourinary:   Genitourinary Comments: Vazquez in place   Musculoskeletal: Normal range of motion.   Neurological: He is alert and oriented to person, place, and time.   Skin: Skin is warm and dry. He is not diaphoretic.         Lines/Drains/Airways     Central Venous Catheter Line                  Percutaneous Central Line Insertion/Assessment - triple lumen  04/06/19 0700 right internal jugular less than 1 day          Drain                 Urethral Catheter 04/05/19 0735 Non-latex;Temperature probe 14 Fr. 1 day         Y Chest Tube 1 and 2 04/05/19 1111 1 Anterior Mediastinal 19 Fr. Posterior Mediastinal 19 Fr. 1 day          Arterial Line                 Arterial Line 04/05/19 0710 Left Radial 1 day          Line                 Pacer Wires 04/05/19 1318 1 day          Peripheral Intravenous Line                 Peripheral IV - Single Lumen 04/05/19 0550 Right Forearm 2 days         Peripheral IV - Single Lumen 04/05/19 0745 Left Upper Arm 1 day                Significant Labs:    CBC/Anemia Profile:  Recent Labs   Lab 04/06/19  0332 04/06/19  1452 04/07/19  0317   WBC 5.91  5.91 8.52 9.40  9.40   HGB 8.9*  8.9* 9.3* 8.8*  8.8*   HCT 26.3*  26.3* 27.4* 26.1*  26.1*   PLT 73*  73* 77* 80*  80*   MCV 95  95 95 95  95   RDW 13.8  13.8 14.0 13.8  13.8        Chemistries:  Recent Labs   Lab 04/05/19  2201  04/06/19  1452 04/06/19 2005 04/07/19  0317      < > 141 139 137  137   K 3.9   < > 4.4 4.1  4.1 3.9  3.9      < > 109 107 104  104   CO2 28   < > 25 24 27  27   BUN 13   < > 15 16 18  18   CREATININE 0.9   < > 1.0 0.9 0.9  0.9   CALCIUM 8.4*   < > 8.7 8.7 8.9  8.9   ALBUMIN 3.3*  --   --   --   --    PROT 5.4*  --   --   --   --    BILITOT 3.4*  --   --   --   --    ALKPHOS 46*  --   --   --   --    ALT 16  --   --   --   --    AST 37  --   --   --   --    MG 1.9   < > 2.2 2.2 2.2  2.2   PHOS 2.6*   < > 2.4* 2.1* 3.0  3.0    < > = values in this interval not displayed.       All pertinent labs within the past 24 hours have been reviewed.    Significant Imaging:  I have reviewed all pertinent imaging results/findings within the past 24 hours.    Assessment/Plan:     * S/P aortic valve repair  59 y.o. male w/ a significant PMHx of HTN, AI, and ascending aortic aneurysm (max  diameter of ascending aorta is 4.3 cm) who underwent Bentall Procedure on 04/05.  Extubated 4/6/19.     Neuro:   -  fully oriented  - PRN pain medications, not requesting     Pulmonary:   -  extubated 4/6  - 1L NC  - CXR unchanged     Cardiac:   - S/p Bentall Procedure on 04/05  - Epi and Norepi gtts weaned off.   - Cardene gtt @ 2.5mg/h to maintain map <80, turned off this morning     Renal:    - Vazquez in place  - Monitor UOP - 80-200m/hr with furosemide 40 TID  - BUN/Cr wnl       Intake/Output Summary (Last 24 hours) at 4/7/2019 0651  Last data filed at 4/7/2019 0600  Gross per 24 hour   Intake 1380 ml   Output 4110 ml   Net -2730 ml       ID:   - Febrile overnight Tmax 100.7  - No leukocytosis, will continue to monitor   - Ancef post op per protocol     Hem/Onc:   - Chest tube put out approx 1500 cc upon arrival to SICU, but now significantly decreased 5-10ml/hr  - H/H stable at this time, Hgb 8.8  - Will continue to monitor     Endocrine:    -  low dose SSI  - Monitor BG      Fluids/Electrolytes/Nutrition/GI:   - Replace electrolytes PRN     PPx:  pantoprazole    DISPO:  SICU, consider step down  Primary: CTS       Francisco Vallecillo MD  Critical Care - Surgery  [facility]

## 2019-04-07 NOTE — PROGRESS NOTES
[facility]  Cardiothoracic Surgery  Progress Note    Patient Name: Devin Lopez  MRN: [id number]  Admission Date: 4/5/2019  Hospital Length of Stay: 2 days  Code Status: Full Code   Attending Physician: Yuri Burks MD   Referring Provider: Yuri Burks MD  Principal Problem:S/P aortic valve repair            Subjective:     Post-Op Info:  Procedure(s) (LRB):  BENTALL PROCEDURE (N/A)   2 Days Post-Op     Interval History: s/p Bentall procedure on 4/5.   No acute events overnight. HDS. Afebrile.   Extubated yesterday morning, now on 1L NC.   Required cardene overnight, now off.   Not taking pain medications  ROS    Medications:  Continuous Infusions:   sodium chloride 0.9% Stopped (04/05/19 0745)    dextrose 5 % and 0.45 % NaCl with KCl 20 mEq 5 mL/hr at 04/07/19 0800    nicardipine 2.5 mg/hr (04/07/19 0800)     Scheduled Meds:   aspirin  325 mg Oral Daily    atorvastatin  10 mg Oral Daily    carvedilol  3.125 mg Oral BID    docusate sodium  100 mg Oral BID    furosemide  40 mg Intravenous TID    mupirocin  1 g Nasal BID    pantoprazole  40 mg Intravenous Daily    polyethylene glycol  17 g Oral Daily     PRN Meds:acetaminophen, aspirin, bisacodyl, dextrose 50%, glucagon (human recombinant), hydrALAZINE, insulin aspart U-100, magnesium sulfate IVPB, magnesium sulfate IVPB, metoclopramide HCl, ondansetron, oxyCODONE, oxyCODONE, potassium chloride in water **AND** potassium chloride in water **AND** potassium chloride in water, sodium chloride 0.9%     Objective:     Vital Signs (Most Recent):  Temp: 99.5 °F (37.5 °C) (04/07/19 0700)  Pulse: 88 (04/07/19 0800)  Resp: (!) 32 (04/07/19 0800)  BP: 129/68 (04/07/19 0800)  SpO2: 95 % (04/07/19 0800) Vital Signs (24h Range):  Temp:  [99.2 °F (37.3 °C)-100.6 °F (38.1 °C)] 99.5 °F (37.5 °C)  Pulse:  [] 88  Resp:  [14-34] 32  SpO2:  [92 %-100 %] 95 %  BP: ()/(59-81) 129/68  Arterial Line BP: ()/(34-68) 127/67      Weight: 76.9 kg (169 lb 8.5 oz)  Body mass index is 23.65 kg/m².    SpO2: 95 %  O2 Device (Oxygen Therapy): nasal cannula    Intake/Output - Last 3 Shifts       04/05 0700 - 04/06 0659 04/06 0700 - 04/07 0659 04/07 0700 - 04/08 0659    P.O.  545     I.V. (mL/kg) 5939.5 (76.8) 685 (8.9)     Blood 3760      NG/GT 60 150     Total Intake(mL/kg) 9759.5 (126.3) 1380 (17.9)     Urine (mL/kg/hr) 3500 (1.9) 3925 (2.1) 120 (0.6)    Drains 150      Chest Tube 1770 185 60    Total Output 5420 4110 180    Net +4339.5 -2730 -180                 Lines/Drains/Airways     Central Venous Catheter Line                 Percutaneous Central Line Insertion/Assessment - triple lumen  04/06/19 0700 right internal jugular 1 day          Drain                 Urethral Catheter 04/05/19 0735 Non-latex;Temperature probe 14 Fr. 2 days         Y Chest Tube 1 and 2 04/05/19 1111 1 Anterior Mediastinal 19 Fr. Posterior Mediastinal 19 Fr. 1 day          Arterial Line                 Arterial Line 04/05/19 0710 Left Radial 2 days          Line                 Pacer Wires 04/05/19 1318 1 day          Peripheral Intravenous Line                 Peripheral IV - Single Lumen 04/05/19 0550 Right Forearm 2 days         Peripheral IV - Single Lumen 04/05/19 0745 Left Upper Arm 2 days                Physical Exam   Constitutional: He is oriented to person, place, and time. He appears well-developed and well-nourished. No distress.   Cardiovascular: Normal rate and regular rhythm.   Midline incision covered with island dressing. CT in place with ss output    Pulmonary/Chest: Effort normal. No respiratory distress.   Abdominal: Soft. He exhibits no distension. There is no tenderness.   Genitourinary:   Genitourinary Comments: Vazquez in place   Musculoskeletal: Normal range of motion.   Neurological: He is alert and oriented to person, place, and time.   Skin: Skin is warm and dry. He is not diaphoretic.       Significant Labs:  ABGs:   Recent Labs   Lab  04/06/19  1002   PH 7.443   PCO2 33.5*   PO2 130*   HCO3 22.9*   POCSATURATED 99   BE -1     Cardiac markers: No results for input(s): CKMB, CPKMB, TROPONINT, TROPONINI, MYOGLOBIN in the last 48 hours.  CBC:   Recent Labs   Lab 04/07/19 0317   WBC 9.40  9.40   RBC 2.76*  2.76*   HGB 8.8*  8.8*   HCT 26.1*  26.1*   PLT 80*  80*   MCV 95  95   MCH 31.9*  31.9*   MCHC 33.7  33.7     CMP:   Recent Labs   Lab 04/05/19 2201 04/07/19 0317 04/07/19  0800      < > 145*  145*  --    CALCIUM 8.4*   < > 8.9  8.9  --    ALBUMIN 3.3*  --   --   --    PROT 5.4*  --   --   --       < > 137  137  --    K 3.9   < > 3.9  3.9 4.2   CO2 28   < > 27  27  --       < > 104  104  --    BUN 13   < > 18  18  --    CREATININE 0.9   < > 0.9  0.9  --    ALKPHOS 46*  --   --   --    ALT 16  --   --   --    AST 37  --   --   --    BILITOT 3.4*  --   --   --     < > = values in this interval not displayed.     Coagulation:   Recent Labs   Lab 04/07/19 0317   INR 1.0   APTT 26.2     Lactic Acid: No results for input(s): LACTATE in the last 48 hours.  LFTs:   Recent Labs   Lab 04/05/19 2201   ALT 16   AST 37   ALKPHOS 46*   BILITOT 3.4*   PROT 5.4*   ALBUMIN 3.3*       Significant Diagnostics:  CXR: I have reviewed all pertinent results/findings within the past 24 hours    Assessment/Plan:     * S/P aortic valve repair  58 yo male s/p Bentall procedure on 4/5     Plan:  Neuro:   -Awake and alert.   -Pain control: prn oxycodone, prn dilaudid      Pulmonary:   -Extubated on 4/6  -On 1L NC wean as tolerated  -CXR daily   -IS, duonebs  -CT in place     Cardiac:  -MAP: 60-80 goal  -Cardene currently off. PRN bp medications started.   -Not requiring pressor support   -CT with ss output, will monitor output. Remove tomorrow if output still low  -ASA, statin   -Coreg 6.25 BID  -prn hydralazine     FEN/GI:  -Cardiac diet advance   -Bowel regimen: miralax, docusate  -Protonix 40mg daily      /Renal:   - Vazquez: in  place  - BUN/Cr:  stable, will trend  - UOP:  3575cc since admission to sicu   -lasix 40 bid     Infectious Disease:   -Afebrile  -WBC:  9.4, will trend   -Antibiotics:ancef, post operative dose   -Cultures: none      Hematology/Oncology:  -H/H:  8.8/26.1, will trend   -required no blood products overnight   - ASA      Endocrine:  -BG: insulin gtt      Dispo:CTS primary. Transfer to CTSU today          Blanca Gómez MD  Cardiothoracic Surgery  [facility]

## 2019-04-07 NOTE — PLAN OF CARE
Problem: Occupational Therapy Goal  Goal: Occupational Therapy Goal  Goals to be met by: 4/14/19     Patient will increase functional independence with ADLs by performing:    UE Dressing with Supervision.  LE Dressing with Supervision.  Grooming while standing at sink with Supervision.  Toileting from toilet with Supervision for hygiene and clothing management.   Toilet transfers with Supervision.    Outcome: Ongoing (interventions implemented as appropriate)  OT eval completed, and above goals established. SERGIO Esqueda  4/7/2019

## 2019-04-07 NOTE — SUBJECTIVE & OBJECTIVE
Interval History: s/p Bentall procedure on 4/5.   No acute events overnight. HDS. Afebrile.   Extubated yesterday morning, now on 1L NC.   Required cardene overnight, now off.   Not taking pain medications  ROS    Medications:  Continuous Infusions:   sodium chloride 0.9% Stopped (04/05/19 0745)    dextrose 5 % and 0.45 % NaCl with KCl 20 mEq 5 mL/hr at 04/07/19 0800    nicardipine 2.5 mg/hr (04/07/19 0800)     Scheduled Meds:   aspirin  325 mg Oral Daily    atorvastatin  10 mg Oral Daily    carvedilol  3.125 mg Oral BID    docusate sodium  100 mg Oral BID    furosemide  40 mg Intravenous TID    mupirocin  1 g Nasal BID    pantoprazole  40 mg Intravenous Daily    polyethylene glycol  17 g Oral Daily     PRN Meds:acetaminophen, aspirin, bisacodyl, dextrose 50%, glucagon (human recombinant), hydrALAZINE, insulin aspart U-100, magnesium sulfate IVPB, magnesium sulfate IVPB, metoclopramide HCl, ondansetron, oxyCODONE, oxyCODONE, potassium chloride in water **AND** potassium chloride in water **AND** potassium chloride in water, sodium chloride 0.9%     Objective:     Vital Signs (Most Recent):  Temp: 99.5 °F (37.5 °C) (04/07/19 0700)  Pulse: 88 (04/07/19 0800)  Resp: (!) 32 (04/07/19 0800)  BP: 129/68 (04/07/19 0800)  SpO2: 95 % (04/07/19 0800) Vital Signs (24h Range):  Temp:  [99.2 °F (37.3 °C)-100.6 °F (38.1 °C)] 99.5 °F (37.5 °C)  Pulse:  [] 88  Resp:  [14-34] 32  SpO2:  [92 %-100 %] 95 %  BP: ()/(59-81) 129/68  Arterial Line BP: ()/(34-68) 127/67     Weight: 76.9 kg (169 lb 8.5 oz)  Body mass index is 23.65 kg/m².    SpO2: 95 %  O2 Device (Oxygen Therapy): nasal cannula    Intake/Output - Last 3 Shifts       04/05 0700 - 04/06 0659 04/06 0700 - 04/07 0659 04/07 0700 - 04/08 0659    P.O.  545     I.V. (mL/kg) 5939.5 (76.8) 685 (8.9)     Blood 3760      NG/GT 60 150     Total Intake(mL/kg) 9759.5 (126.3) 1380 (17.9)     Urine (mL/kg/hr) 3500 (1.9) 3925 (2.1) 120 (0.6)    Drains 150       Chest Tube 1770 185 60    Total Output 5420 4110 180    Net +4339.5 -2730 -180                 Lines/Drains/Airways     Central Venous Catheter Line                 Percutaneous Central Line Insertion/Assessment - triple lumen  04/06/19 0700 right internal jugular 1 day          Drain                 Urethral Catheter 04/05/19 0735 Non-latex;Temperature probe 14 Fr. 2 days         Y Chest Tube 1 and 2 04/05/19 1111 1 Anterior Mediastinal 19 Fr. Posterior Mediastinal 19 Fr. 1 day          Arterial Line                 Arterial Line 04/05/19 0710 Left Radial 2 days          Line                 Pacer Wires 04/05/19 1318 1 day          Peripheral Intravenous Line                 Peripheral IV - Single Lumen 04/05/19 0550 Right Forearm 2 days         Peripheral IV - Single Lumen 04/05/19 0745 Left Upper Arm 2 days                Physical Exam   Constitutional: He is oriented to person, place, and time. He appears well-developed and well-nourished. No distress.   Cardiovascular: Normal rate and regular rhythm.   Midline incision covered with island dressing. CT in place with ss output    Pulmonary/Chest: Effort normal. No respiratory distress.   Abdominal: Soft. He exhibits no distension. There is no tenderness.   Genitourinary:   Genitourinary Comments: Vazquez in place   Musculoskeletal: Normal range of motion.   Neurological: He is alert and oriented to person, place, and time.   Skin: Skin is warm and dry. He is not diaphoretic.       Significant Labs:  ABGs:   Recent Labs   Lab 04/06/19  1002   PH 7.443   PCO2 33.5*   PO2 130*   HCO3 22.9*   POCSATURATED 99   BE -1     Cardiac markers: No results for input(s): CKMB, CPKMB, TROPONINT, TROPONINI, MYOGLOBIN in the last 48 hours.  CBC:   Recent Labs   Lab 04/07/19 0317   WBC 9.40  9.40   RBC 2.76*  2.76*   HGB 8.8*  8.8*   HCT 26.1*  26.1*   PLT 80*  80*   MCV 95  95   MCH 31.9*  31.9*   MCHC 33.7  33.7     CMP:   Recent Labs   Lab 04/05/19  2201  04/07/19 0317  04/07/19  0800      < > 145*  145*  --    CALCIUM 8.4*   < > 8.9  8.9  --    ALBUMIN 3.3*  --   --   --    PROT 5.4*  --   --   --       < > 137  137  --    K 3.9   < > 3.9  3.9 4.2   CO2 28   < > 27  27  --       < > 104  104  --    BUN 13   < > 18  18  --    CREATININE 0.9   < > 0.9  0.9  --    ALKPHOS 46*  --   --   --    ALT 16  --   --   --    AST 37  --   --   --    BILITOT 3.4*  --   --   --     < > = values in this interval not displayed.     Coagulation:   Recent Labs   Lab 04/07/19  0317   INR 1.0   APTT 26.2     Lactic Acid: No results for input(s): LACTATE in the last 48 hours.  LFTs:   Recent Labs   Lab 04/05/19  2201   ALT 16   AST 37   ALKPHOS 46*   BILITOT 3.4*   PROT 5.4*   ALBUMIN 3.3*       Significant Diagnostics:  CXR: I have reviewed all pertinent results/findings within the past 24 hours

## 2019-04-07 NOTE — PLAN OF CARE
Problem: Adult Inpatient Plan of Care  Goal: Plan of Care Review  Outcome: Ongoing (interventions implemented as appropriate)  VSS. Pt remains on NC at 1L. No continuous gtts. Arterial line and left IJ triple lumen removed, pt tolerated well. UOP variable with lasix and chest tube output minimal, see I&O sheet for specifics. Pt is AAO x 4. Incision and chest tube site are clean, dry and intact. Pt ambulated to the chair today with minimal assistance.

## 2019-04-07 NOTE — SUBJECTIVE & OBJECTIVE
Interval History/Significant Events: no events overnight    Follow-up For: Procedure(s) (LRB):  BENTALL PROCEDURE (N/A)    Post-Operative Day: 2 Days Post-Op    Objective:     Vital Signs (Most Recent):  Temp: 99.2 °F (37.3 °C) (04/07/19 0300)  Pulse: 78 (04/07/19 0630)  Resp: (!) 30 (04/07/19 0630)  BP: (!) 108/59 (04/07/19 0630)  SpO2: (!) 93 % (04/07/19 0630) Vital Signs (24h Range):  Temp:  [99.2 °F (37.3 °C)-100.7 °F (38.2 °C)] 99.2 °F (37.3 °C)  Pulse:  [] 78  Resp:  [14-34] 30  SpO2:  [92 %-100 %] 93 %  BP: ()/(59-81) 108/59  Arterial Line BP: ()/(41-68) 105/54     Weight: 76.9 kg (169 lb 8.5 oz)  Body mass index is 23.65 kg/m².      Intake/Output Summary (Last 24 hours) at 4/7/2019 0650  Last data filed at 4/7/2019 0600  Gross per 24 hour   Intake 1380 ml   Output 4110 ml   Net -2730 ml       Physical Exam   Constitutional: He is oriented to person, place, and time. He appears well-developed and well-nourished. No distress.   Cardiovascular: Normal rate and regular rhythm.   Midline incision covered with island dressing. CT in place with ss output    Pulmonary/Chest: Effort normal. No respiratory distress.   1 L NC   Abdominal: Soft. He exhibits no distension. There is no tenderness.   Genitourinary:   Genitourinary Comments: Vazquez in place   Musculoskeletal: Normal range of motion.   Neurological: He is alert and oriented to person, place, and time.   Skin: Skin is warm and dry. He is not diaphoretic.         Lines/Drains/Airways     Central Venous Catheter Line                 Percutaneous Central Line Insertion/Assessment - triple lumen  04/06/19 0700 right internal jugular less than 1 day          Drain                 Urethral Catheter 04/05/19 0735 Non-latex;Temperature probe 14 Fr. 1 day         Y Chest Tube 1 and 2 04/05/19 1111 1 Anterior Mediastinal 19 Fr. Posterior Mediastinal 19 Fr. 1 day          Arterial Line                 Arterial Line 04/05/19 0710 Left Radial 1 day           Line                 Pacer Wires 04/05/19 1318 1 day          Peripheral Intravenous Line                 Peripheral IV - Single Lumen 04/05/19 0550 Right Forearm 2 days         Peripheral IV - Single Lumen 04/05/19 0745 Left Upper Arm 1 day                Significant Labs:    CBC/Anemia Profile:  Recent Labs   Lab 04/06/19 0332 04/06/19 1452 04/07/19 0317   WBC 5.91  5.91 8.52 9.40  9.40   HGB 8.9*  8.9* 9.3* 8.8*  8.8*   HCT 26.3*  26.3* 27.4* 26.1*  26.1*   PLT 73*  73* 77* 80*  80*   MCV 95  95 95 95  95   RDW 13.8  13.8 14.0 13.8  13.8        Chemistries:  Recent Labs   Lab 04/05/19 2201 04/06/19 1452 04/06/19 2005 04/07/19 0317      < > 141 139 137  137   K 3.9   < > 4.4 4.1  4.1 3.9  3.9      < > 109 107 104  104   CO2 28   < > 25 24 27  27   BUN 13   < > 15 16 18  18   CREATININE 0.9   < > 1.0 0.9 0.9  0.9   CALCIUM 8.4*   < > 8.7 8.7 8.9  8.9   ALBUMIN 3.3*  --   --   --   --    PROT 5.4*  --   --   --   --    BILITOT 3.4*  --   --   --   --    ALKPHOS 46*  --   --   --   --    ALT 16  --   --   --   --    AST 37  --   --   --   --    MG 1.9   < > 2.2 2.2 2.2  2.2   PHOS 2.6*   < > 2.4* 2.1* 3.0  3.0    < > = values in this interval not displayed.       All pertinent labs within the past 24 hours have been reviewed.    Significant Imaging:  I have reviewed all pertinent imaging results/findings within the past 24 hours.

## 2019-04-07 NOTE — ANESTHESIA POSTPROCEDURE EVALUATION
Anesthesia Post Evaluation    Patient: Devin Lopez    Procedure(s) Performed: Procedure(s) (LRB):  BENTALL PROCEDURE (N/A)    Final Anesthesia Type: general  Patient location during evaluation: ICU  Patient participation: Yes- Able to Participate  Level of consciousness: awake and alert  Post-procedure vital signs: reviewed and stable  Pain management: adequate  Airway patency: patent  PONV status at discharge: No PONV  Anesthetic complications: no      Cardiovascular status: blood pressure returned to baseline  Respiratory status: unassisted and nasal cannula  Hydration status: euvolemic  Follow-up not needed.          Vitals Value Taken Time   /58 4/7/2019  1:17 PM   Temp 37.2 °C (99 °F) 4/7/2019 12:00 PM   Pulse 91 4/7/2019  1:18 PM   Resp 25 4/7/2019  1:18 PM   SpO2 98 % 4/7/2019  1:18 PM   Vitals shown include unvalidated device data.      No case tracking events are documented in the log.      Pain/Patel Score: Pain Rating Prior to Med Admin: 5 (4/7/2019  7:55 AM)  Pain Rating Post Med Admin: 0 (4/7/2019  8:55 AM)

## 2019-04-07 NOTE — CARE UPDATE
BG goal 140 - 180      BG continues to be within goal without need for insulin therapy. Will continue tofollow, and monitor for prandial excursions. Patient is post-op day 2 Bentall Procedure.    Continue Low Dose SQ Insulin Correction Scale PRN hyperglycemia.  BG monitoring AC/HS.      Discharge planning: [facility]

## 2019-04-07 NOTE — PROGRESS NOTES
Pharmacist Intervention IV to PO Note    Devin Lopez is a 59 y.o. male being treated with IV medication pantoprazole    Patient Data:    Vital Signs (Most Recent):  Temp: 99 °F (37.2 °C) (04/07/19 1200)  Pulse: 87 (04/07/19 1215)  Resp: (!) 25 (04/07/19 1215)  BP: 105/68 (04/07/19 1215)  SpO2: 98 % (04/07/19 1215)   Vital Signs (72h Range):  Temp:  [96.8 °F (36 °C)-101.8 °F (38.8 °C)]   Pulse:  []   Resp:  [13-44]   BP: ()/(57-88)   SpO2:  [82 %-100 %]   Arterial Line BP: ()/()      CBC:  Recent Labs   Lab 04/06/19  0332 04/06/19 1452 04/07/19 0317   WBC 5.91  5.91 8.52 9.40  9.40   RBC 2.77*  2.77* 2.90* 2.76*  2.76*   HGB 8.9*  8.9* 9.3* 8.8*  8.8*   HCT 26.3*  26.3* 27.4* 26.1*  26.1*   PLT 73*  73* 77* 80*  80*   MCV 95  95 95 95  95   MCH 32.1*  32.1* 32.1* 31.9*  31.9*   MCHC 33.8  33.8 33.9 33.7  33.7     CMP:     Recent Labs   Lab 04/04/19  1152  04/05/19  2201 04/06/19  1452 04/06/19 2005 04/07/19  0317 04/07/19  0800   GLU 70   < > 103   < > 121* 132* 145*  145*  --    CALCIUM 9.8   < > 8.4*   < > 8.7 8.7 8.9  8.9  --    ALBUMIN 4.2  --  3.3*  --   --   --   --   --    PROT 7.7  --  5.4*  --   --   --   --   --       < > 143   < > 141 139 137  137  --    K 4.4   < > 3.9   < > 4.4 4.1  4.1 3.9  3.9 4.2   CO2 31*   < > 28   < > 25 24 27  27  --       < > 108   < > 109 107 104  104  --    BUN 13   < > 13   < > 15 16 18  18  --    CREATININE 1.2   < > 0.9   < > 1.0 0.9 0.9  0.9  --    ALKPHOS 72  --  46*  --   --   --   --   --    ALT 23  --  16  --   --   --   --   --    AST 25  --  37  --   --   --   --   --    BILITOT 1.7*  --  3.4*  --   --   --   --   --     < > = values in this interval not displayed.       Dietary Orders:  Diet Orders            Diet Cardiac Fluid - 1500mL: Cardiac starting at 04/07 1140            Based on the following criteria, this patient qualifies for intravenous to oral conversion:  [x] The patient?s  gastrointestinal tract is functioning (tolerating medications via oral or enteral route for 24 hours and tolerating food or enteral feeds for 24 hours).  [x] The patient is hemodynamically stable for 24 hours (heart rate <100 beats per minute, systolic blood pressure >99 mm Hg, and respiratory rate <20 breaths per minute).  [x] The patient shows clinical improvement (afebrile for at least 24 hours and white blood cell count downtrending or normalized). Additionally, the patient must be non-neutropenic (absolute neutrophil count >500 cells/mm3).  [x] For antimicrobials, the patient has received IV therapy for at least 24 hours.    IV medication pantoprazole will be changed to oral medication pantoprazole    Pharmacist's Name: Jayson Melton  Pharmacist's Extension: [phone]

## 2019-04-08 LAB
ANION GAP SERPL CALC-SCNC: 8 MMOL/L (ref 8–16)
ANION GAP SERPL CALC-SCNC: 8 MMOL/L (ref 8–16)
APTT BLDCRRT: 24.3 SEC (ref 21–32)
BASOPHILS # BLD AUTO: 0.01 K/UL (ref 0–0.2)
BASOPHILS # BLD AUTO: 0.01 K/UL (ref 0–0.2)
BASOPHILS NFR BLD: 0.1 % (ref 0–1.9)
BASOPHILS NFR BLD: 0.1 % (ref 0–1.9)
BLD PROD TYP BPU: NORMAL
BLOOD UNIT EXPIRATION DATE: NORMAL
BLOOD UNIT TYPE CODE: 600
BLOOD UNIT TYPE CODE: 600
BLOOD UNIT TYPE CODE: 6200
BLOOD UNIT TYPE: NORMAL
BUN SERPL-MCNC: 29 MG/DL (ref 6–20)
BUN SERPL-MCNC: 29 MG/DL (ref 6–20)
CALCIUM SERPL-MCNC: 8.8 MG/DL (ref 8.7–10.5)
CALCIUM SERPL-MCNC: 8.8 MG/DL (ref 8.7–10.5)
CHLORIDE SERPL-SCNC: 103 MMOL/L (ref 95–110)
CHLORIDE SERPL-SCNC: 103 MMOL/L (ref 95–110)
CO2 SERPL-SCNC: 28 MMOL/L (ref 23–29)
CO2 SERPL-SCNC: 28 MMOL/L (ref 23–29)
CODING SYSTEM: NORMAL
CREAT SERPL-MCNC: 1.1 MG/DL (ref 0.5–1.4)
CREAT SERPL-MCNC: 1.1 MG/DL (ref 0.5–1.4)
DIFFERENTIAL METHOD: ABNORMAL
DIFFERENTIAL METHOD: ABNORMAL
DISPENSE STATUS: NORMAL
EOSINOPHIL # BLD AUTO: 0 K/UL (ref 0–0.5)
EOSINOPHIL # BLD AUTO: 0 K/UL (ref 0–0.5)
EOSINOPHIL NFR BLD: 0.3 % (ref 0–8)
EOSINOPHIL NFR BLD: 0.3 % (ref 0–8)
ERYTHROCYTE [DISTWIDTH] IN BLOOD BY AUTOMATED COUNT: 13.5 % (ref 11.5–14.5)
ERYTHROCYTE [DISTWIDTH] IN BLOOD BY AUTOMATED COUNT: 13.5 % (ref 11.5–14.5)
EST. GFR  (AFRICAN AMERICAN): >60 ML/MIN/1.73 M^2
EST. GFR  (AFRICAN AMERICAN): >60 ML/MIN/1.73 M^2
EST. GFR  (NON AFRICAN AMERICAN): >60 ML/MIN/1.73 M^2
EST. GFR  (NON AFRICAN AMERICAN): >60 ML/MIN/1.73 M^2
GLUCOSE SERPL-MCNC: 95 MG/DL (ref 70–110)
GLUCOSE SERPL-MCNC: 95 MG/DL (ref 70–110)
HCT VFR BLD AUTO: 27.8 % (ref 40–54)
HCT VFR BLD AUTO: 27.8 % (ref 40–54)
HGB BLD-MCNC: 8.9 G/DL (ref 14–18)
HGB BLD-MCNC: 8.9 G/DL (ref 14–18)
IMM GRANULOCYTES # BLD AUTO: 0.04 K/UL (ref 0–0.04)
IMM GRANULOCYTES # BLD AUTO: 0.04 K/UL (ref 0–0.04)
IMM GRANULOCYTES NFR BLD AUTO: 0.4 % (ref 0–0.5)
IMM GRANULOCYTES NFR BLD AUTO: 0.4 % (ref 0–0.5)
INR PPP: 0.9 (ref 0.8–1.2)
LYMPHOCYTES # BLD AUTO: 0.9 K/UL (ref 1–4.8)
LYMPHOCYTES # BLD AUTO: 0.9 K/UL (ref 1–4.8)
LYMPHOCYTES NFR BLD: 9.1 % (ref 18–48)
LYMPHOCYTES NFR BLD: 9.1 % (ref 18–48)
MAGNESIUM SERPL-MCNC: 2 MG/DL (ref 1.6–2.6)
MAGNESIUM SERPL-MCNC: 2 MG/DL (ref 1.6–2.6)
MCH RBC QN AUTO: 31.7 PG (ref 27–31)
MCH RBC QN AUTO: 31.7 PG (ref 27–31)
MCHC RBC AUTO-ENTMCNC: 32 G/DL (ref 32–36)
MCHC RBC AUTO-ENTMCNC: 32 G/DL (ref 32–36)
MCV RBC AUTO: 99 FL (ref 82–98)
MCV RBC AUTO: 99 FL (ref 82–98)
MONOCYTES # BLD AUTO: 0.7 K/UL (ref 0.3–1)
MONOCYTES # BLD AUTO: 0.7 K/UL (ref 0.3–1)
MONOCYTES NFR BLD: 7.1 % (ref 4–15)
MONOCYTES NFR BLD: 7.1 % (ref 4–15)
NEUTROPHILS # BLD AUTO: 8.1 K/UL (ref 1.8–7.7)
NEUTROPHILS # BLD AUTO: 8.1 K/UL (ref 1.8–7.7)
NEUTROPHILS NFR BLD: 83 % (ref 38–73)
NEUTROPHILS NFR BLD: 83 % (ref 38–73)
NRBC BLD-RTO: 0 /100 WBC
NRBC BLD-RTO: 0 /100 WBC
NUM UNITS TRANS FFP: NORMAL
PHOSPHATE SERPL-MCNC: 2.7 MG/DL (ref 2.7–4.5)
PHOSPHATE SERPL-MCNC: 2.7 MG/DL (ref 2.7–4.5)
PLATELET # BLD AUTO: 97 K/UL (ref 150–350)
PLATELET # BLD AUTO: 97 K/UL (ref 150–350)
PMV BLD AUTO: 10.9 FL (ref 9.2–12.9)
PMV BLD AUTO: 10.9 FL (ref 9.2–12.9)
POCT GLUCOSE: 102 MG/DL (ref 70–110)
POCT GLUCOSE: 121 MG/DL (ref 70–110)
POCT GLUCOSE: 128 MG/DL (ref 70–110)
POCT GLUCOSE: 141 MG/DL (ref 70–110)
POTASSIUM SERPL-SCNC: 3.6 MMOL/L (ref 3.5–5.1)
POTASSIUM SERPL-SCNC: 3.6 MMOL/L (ref 3.5–5.1)
POTASSIUM SERPL-SCNC: 3.8 MMOL/L (ref 3.5–5.1)
PROTHROMBIN TIME: 9.8 SEC (ref 9–12.5)
RBC # BLD AUTO: 2.81 M/UL (ref 4.6–6.2)
RBC # BLD AUTO: 2.81 M/UL (ref 4.6–6.2)
SODIUM SERPL-SCNC: 139 MMOL/L (ref 136–145)
SODIUM SERPL-SCNC: 139 MMOL/L (ref 136–145)
WBC # BLD AUTO: 9.7 K/UL (ref 3.9–12.7)
WBC # BLD AUTO: 9.7 K/UL (ref 3.9–12.7)

## 2019-04-08 PROCEDURE — 84132 ASSAY OF SERUM POTASSIUM: CPT

## 2019-04-08 PROCEDURE — 63600175 PHARM REV CODE 636 W HCPCS: Performed by: STUDENT IN AN ORGANIZED HEALTH CARE EDUCATION/TRAINING PROGRAM

## 2019-04-08 PROCEDURE — 20000000 HC ICU ROOM

## 2019-04-08 PROCEDURE — 84100 ASSAY OF PHOSPHORUS: CPT

## 2019-04-08 PROCEDURE — 25000003 PHARM REV CODE 250: Performed by: STUDENT IN AN ORGANIZED HEALTH CARE EDUCATION/TRAINING PROGRAM

## 2019-04-08 PROCEDURE — 25000003 PHARM REV CODE 250: Performed by: THORACIC SURGERY (CARDIOTHORACIC VASCULAR SURGERY)

## 2019-04-08 PROCEDURE — 85610 PROTHROMBIN TIME: CPT

## 2019-04-08 PROCEDURE — 94761 N-INVAS EAR/PLS OXIMETRY MLT: CPT

## 2019-04-08 PROCEDURE — 97161 PT EVAL LOW COMPLEX 20 MIN: CPT

## 2019-04-08 PROCEDURE — 80048 BASIC METABOLIC PNL TOTAL CA: CPT

## 2019-04-08 PROCEDURE — 85025 COMPLETE CBC W/AUTO DIFF WBC: CPT

## 2019-04-08 PROCEDURE — 99231 SBSQ HOSP IP/OBS SF/LOW 25: CPT | Mod: ,,, | Performed by: NURSE PRACTITIONER

## 2019-04-08 PROCEDURE — 83735 ASSAY OF MAGNESIUM: CPT

## 2019-04-08 PROCEDURE — 99231 PR SUBSEQUENT HOSPITAL CARE,LEVL I: ICD-10-PCS | Mod: ,,, | Performed by: NURSE PRACTITIONER

## 2019-04-08 PROCEDURE — 85730 THROMBOPLASTIN TIME PARTIAL: CPT

## 2019-04-08 RX ORDER — POTASSIUM CHLORIDE 20 MEQ/1
40 TABLET, EXTENDED RELEASE ORAL ONCE
Status: COMPLETED | OUTPATIENT
Start: 2019-04-08 | End: 2019-04-08

## 2019-04-08 RX ADMIN — FUROSEMIDE 40 MG: 10 INJECTION, SOLUTION INTRAMUSCULAR; INTRAVENOUS at 04:04

## 2019-04-08 RX ADMIN — MUPIROCIN 1 G: 20 OINTMENT TOPICAL at 09:04

## 2019-04-08 RX ADMIN — ASPIRIN 325 MG ORAL TABLET 325 MG: 325 PILL ORAL at 09:04

## 2019-04-08 RX ADMIN — ATORVASTATIN CALCIUM 40 MG: 20 TABLET, FILM COATED ORAL at 09:04

## 2019-04-08 RX ADMIN — POLYETHYLENE GLYCOL 3350 17 G: 17 POWDER, FOR SOLUTION ORAL at 09:04

## 2019-04-08 RX ADMIN — OXYCODONE HYDROCHLORIDE 5 MG: 5 TABLET ORAL at 10:04

## 2019-04-08 RX ADMIN — FUROSEMIDE 40 MG: 10 INJECTION, SOLUTION INTRAMUSCULAR; INTRAVENOUS at 09:04

## 2019-04-08 RX ADMIN — POTASSIUM CHLORIDE 40 MEQ: 1500 TABLET, EXTENDED RELEASE ORAL at 06:04

## 2019-04-08 RX ADMIN — ACETAMINOPHEN 650 MG: 325 TABLET ORAL at 11:04

## 2019-04-08 RX ADMIN — PANTOPRAZOLE SODIUM 40 MG: 40 TABLET, DELAYED RELEASE ORAL at 09:04

## 2019-04-08 RX ADMIN — DOCUSATE SODIUM 100 MG: 100 CAPSULE, LIQUID FILLED ORAL at 08:04

## 2019-04-08 RX ADMIN — FUROSEMIDE 40 MG: 10 INJECTION, SOLUTION INTRAMUSCULAR; INTRAVENOUS at 08:04

## 2019-04-08 RX ADMIN — DOCUSATE SODIUM 100 MG: 100 CAPSULE, LIQUID FILLED ORAL at 09:04

## 2019-04-08 RX ADMIN — MUPIROCIN 1 G: 20 OINTMENT TOPICAL at 08:04

## 2019-04-08 RX ADMIN — CARVEDILOL 6.25 MG: 6.25 TABLET, FILM COATED ORAL at 09:04

## 2019-04-08 NOTE — SUBJECTIVE & OBJECTIVE
Interval History: No acute events overnight. Good UO with lasix 40 TID. Net -1.0L for admit. Tolerating diet. Low output from mediastinal drain.       Medications:  Continuous Infusions:  Scheduled Meds:   aspirin  325 mg Oral Daily    atorvastatin  40 mg Oral Daily    carvedilol  6.25 mg Oral BID    docusate sodium  100 mg Oral BID    furosemide  40 mg Intravenous TID    mupirocin  1 g Nasal BID    pantoprazole  40 mg Oral Daily    polyethylene glycol  17 g Oral Daily     PRN Meds:acetaminophen, aspirin, bisacodyl, dextrose 50%, glucagon (human recombinant), hydrALAZINE, insulin aspart U-100, magnesium sulfate IVPB, magnesium sulfate IVPB, metoclopramide HCl, ondansetron, oxyCODONE, oxyCODONE, potassium chloride in water **AND** potassium chloride in water **AND** potassium chloride in water, sodium chloride 0.9%     Objective:     Vital Signs (Most Recent):  Temp: 99.6 °F (37.6 °C) (04/08/19 0300)  Pulse: 98 (04/08/19 0645)  Resp: (!) 21 (04/08/19 0645)  BP: 107/82 (04/08/19 0630)  SpO2: (!) 90 % (04/08/19 0645) Vital Signs (24h Range):  Temp:  [99 °F (37.2 °C)-100.3 °F (37.9 °C)] 99.6 °F (37.6 °C)  Pulse:  [] 98  Resp:  [14-44] 21  SpO2:  [90 %-100 %] 90 %  BP: ()/(58-89) 107/82  Arterial Line BP: (116-123)/(59-63) 120/61     Weight: 77 kg (169 lb 12.1 oz)  Body mass index is 23.68 kg/m².    SpO2: (!) 90 %  O2 Device (Oxygen Therapy): room air    Intake/Output - Last 3 Shifts       04/06 0700 - 04/07 0659 04/07 0700 - 04/08 0659 04/08 0700 - 04/09 0659    P.O. 545      I.V. (mL/kg) 685 (8.9) 182 (2.4)     Blood       NG/      Total Intake(mL/kg) 1380 (17.9) 182 (2.4)     Urine (mL/kg/hr) 3925 (2.1) 2715 (1.5)     Drains       Chest Tube 185 157     Total Output 4110 2872     Net -2730 -2690                  Lines/Drains/Airways     Drain                 Urethral Catheter 04/05/19 0735 Non-latex;Temperature probe 14 Fr. 3 days         Y Chest Tube 1 and 2 04/05/19 1111 1 Anterior  Mediastinal 19 Fr. Posterior Mediastinal 19 Fr. 2 days          Line                 Pacer Wires 04/05/19 1318 2 days          Peripheral Intravenous Line                 Peripheral IV - Single Lumen 04/05/19 0550 Right Forearm 3 days         Peripheral IV - Single Lumen 04/05/19 0745 Left Upper Arm 3 days                Physical Exam   Constitutional: He is oriented to person, place, and time. He appears well-developed and well-nourished. No distress.   Cardiovascular: Normal rate and regular rhythm.   Midline incision covered with island dressing. CT in place with ss output    Pulmonary/Chest: Effort normal. No respiratory distress.   Abdominal: Soft. He exhibits no distension. There is no tenderness.   Genitourinary:   Genitourinary Comments: Vazquez in place   Musculoskeletal: Normal range of motion.   Neurological: He is alert and oriented to person, place, and time.   Skin: Skin is warm and dry. He is not diaphoretic.       Significant Labs:  ABGs:   Recent Labs   Lab 04/06/19  1002   PH 7.443   PCO2 33.5*   PO2 130*   HCO3 22.9*   POCSATURATED 99   BE -1     CBC:   Recent Labs   Lab 04/08/19  0343   WBC 9.70  9.70   RBC 2.81*  2.81*   HGB 8.9*  8.9*   HCT 27.8*  27.8*   PLT 97*  97*   MCV 99*  99*   MCH 31.7*  31.7*   MCHC 32.0  32.0     CMP:   Recent Labs   Lab 04/08/19  0343   GLU 95  95   CALCIUM 8.8  8.8     139   K 3.6  3.6   CO2 28  28     103   BUN 29*  29*   CREATININE 1.1  1.1     All pertinent labs from the last 24 hours have been reviewed.    Significant Diagnostics:  CXR: I have reviewed all pertinent results/findings within the past 24 hours  I have reviewed all pertinent imaging results/findings within the past 24 hours.

## 2019-04-08 NOTE — PROGRESS NOTES
[facility]  Cardiothoracic Surgery  Progress Note    Patient Name: Devin Lopez  MRN: [id number]  Admission Date: 4/5/2019  Hospital Length of Stay: 3 days  Code Status: Full Code   Attending Physician: Yuri Burks MD   Referring Provider: Yuri Burks MD  Principal Problem:S/P aortic valve repair            Subjective:     Post-Op Info:  Procedure(s) (LRB):  BENTALL PROCEDURE (N/A)   3 Days Post-Op     Interval History: No acute events overnight. Good UO with lasix 40 TID. Net -1.0L for admit. Tolerating diet. Low output from mediastinal drain.       Medications:  Continuous Infusions:  Scheduled Meds:   aspirin  325 mg Oral Daily    atorvastatin  40 mg Oral Daily    carvedilol  6.25 mg Oral BID    docusate sodium  100 mg Oral BID    furosemide  40 mg Intravenous TID    mupirocin  1 g Nasal BID    pantoprazole  40 mg Oral Daily    polyethylene glycol  17 g Oral Daily     PRN Meds:acetaminophen, aspirin, bisacodyl, dextrose 50%, glucagon (human recombinant), hydrALAZINE, insulin aspart U-100, magnesium sulfate IVPB, magnesium sulfate IVPB, metoclopramide HCl, ondansetron, oxyCODONE, oxyCODONE, potassium chloride in water **AND** potassium chloride in water **AND** potassium chloride in water, sodium chloride 0.9%     Objective:     Vital Signs (Most Recent):  Temp: 99.6 °F (37.6 °C) (04/08/19 0300)  Pulse: 98 (04/08/19 0645)  Resp: (!) 21 (04/08/19 0645)  BP: 107/82 (04/08/19 0630)  SpO2: (!) 90 % (04/08/19 0645) Vital Signs (24h Range):  Temp:  [99 °F (37.2 °C)-100.3 °F (37.9 °C)] 99.6 °F (37.6 °C)  Pulse:  [] 98  Resp:  [14-44] 21  SpO2:  [90 %-100 %] 90 %  BP: ()/(58-89) 107/82  Arterial Line BP: (116-123)/(59-63) 120/61     Weight: 77 kg (169 lb 12.1 oz)  Body mass index is 23.68 kg/m².    SpO2: (!) 90 %  O2 Device (Oxygen Therapy): room air    Intake/Output - Last 3 Shifts       04/06 0700 - 04/07 0659 04/07 0700 - 04/08 0659 04/08 0700 - 04/09 0659     P.O. 545      I.V. (mL/kg) 685 (8.9) 182 (2.4)     Blood       NG/      Total Intake(mL/kg) 1380 (17.9) 182 (2.4)     Urine (mL/kg/hr) 3925 (2.1) 2715 (1.5)     Drains       Chest Tube 185 157     Total Output 4110 2872     Net -2730 -2690                  Lines/Drains/Airways     Drain                 Urethral Catheter 04/05/19 0735 Non-latex;Temperature probe 14 Fr. 3 days         Y Chest Tube 1 and 2 04/05/19 1111 1 Anterior Mediastinal 19 Fr. Posterior Mediastinal 19 Fr. 2 days          Line                 Pacer Wires 04/05/19 1318 2 days          Peripheral Intravenous Line                 Peripheral IV - Single Lumen 04/05/19 0550 Right Forearm 3 days         Peripheral IV - Single Lumen 04/05/19 0745 Left Upper Arm 3 days                Physical Exam   Constitutional: He is oriented to person, place, and time. He appears well-developed and well-nourished. No distress.   Cardiovascular: Normal rate and regular rhythm.   Midline incision covered with island dressing. CT in place with ss output    Pulmonary/Chest: Effort normal. No respiratory distress.   Abdominal: Soft. He exhibits no distension. There is no tenderness.   Genitourinary:   Genitourinary Comments: Vazquez in place   Musculoskeletal: Normal range of motion.   Neurological: He is alert and oriented to person, place, and time.   Skin: Skin is warm and dry. He is not diaphoretic.       Significant Labs:  ABGs:   Recent Labs   Lab 04/06/19  1002   PH 7.443   PCO2 33.5*   PO2 130*   HCO3 22.9*   POCSATURATED 99   BE -1     CBC:   Recent Labs   Lab 04/08/19  0343   WBC 9.70  9.70   RBC 2.81*  2.81*   HGB 8.9*  8.9*   HCT 27.8*  27.8*   PLT 97*  97*   MCV 99*  99*   MCH 31.7*  31.7*   MCHC 32.0  32.0     CMP:   Recent Labs   Lab 04/08/19  0343   GLU 95  95   CALCIUM 8.8  8.8     139   K 3.6  3.6   CO2 28  28     103   BUN 29*  29*   CREATININE 1.1  1.1     All pertinent labs from the last 24 hours have been  reviewed.    Significant Diagnostics:  CXR: I have reviewed all pertinent results/findings within the past 24 hours  I have reviewed all pertinent imaging results/findings within the past 24 hours.    Assessment/Plan:     * S/P aortic valve repair  60 yo male s/p Bentall procedure on 4/5     Plan:  Neuro:   -Awake and alert.   -Pain control: prn oxycodone, prn dilaudid      Pulmonary:   -Extubated on 4/6  -On 1L NC wean as tolerated  -CXR daily   -IS, duonebs    Cardiac:  -MAP: 60-80 goal  -Cardene off. PRN bp medications started.   -Not requiring pressor support   -CT with ss output, will monitor output. Remove pending attending discussion  -ASA, statin    -Coreg 6.25 BID  -prn hydralazine     FEN/GI:  -Cardiac diet advance   -Bowel regimen: miralax, docusate  -Protonix 40mg daily      /Renal:   - Vazquez:  removed this AM  - BUN/Cr: stable, will trend  - UOP:  2.6L; net -1.0 L  - lasix 40 bid     Infectious Disease:   -Afebrile  -WBC: 9. 7, will trend   -Antibiotics: ancef ziggy-op complete  -Cultures: none      Hematology/Oncology:  -H/H:  stable, will trend   -required no blood products overnight   -ASA, statin     Endocrine:  -BG: insulin gtt  off, SSI     Dispo:CTS primary. Transfer to [facility] today          ALIZA Gunter MD  Cardiothoracic Surgery  [facility]

## 2019-04-08 NOTE — MEDICAL/APP STUDENT
History of Presenting Illness:  Mr. Lopez, 58 y/o M, is POD #3 s/p Bentall procedure for aortic root replacement on 4/6/19. Presently, denies pain but complains of incisional soreness. Has been sitting in chair and ambulating to bathroom. Denies bowel movement yesterday. Denies fever but complains of chills overnight, patient remains afebrile. Has progressed diet to cardiac diet. No longer requiring NC or pain medications.    Past Medical History:  Past Medical History:   Diagnosis Date    Hypertension     Murmur        Past Surgical History:  Past Surgical History:   Procedure Laterality Date    CATHETERIZATION, HEART, BOTH LEFT AND RIGHT Bilateral 2/8/2019    Performed by Jarred Alarcon [facility] at [facility] CATH LAB    CERVICAL SPINE SURGERY      HEART CATH-RIGHT & LEFT Right 2/27/2018    Performed [facility] at [facility] CATH LAB    SPINE SURGERY         Social History:  Social History     Socioeconomic History    Marital status:      Spouse name: Not on file    Number of children: Not on file    Years of education: Not on file    Highest education level: Not on file   Occupational History    Not on file   Social Needs    Financial resource strain: Not on file    Food insecurity:     Worry: Not on file     Inability: Not on file    Transportation needs:     Medical: Not on file     Non-medical: Not on file   Tobacco Use    Smoking status: Former Smoker     Packs/day: 0.50     Years: 40.00     Pack years: 20.00     Types: Cigarettes     Start date: 1/15/2018    Smokeless tobacco: Never Used   Substance and Sexual Activity    Alcohol use: Yes     Alcohol/week: 8.4 oz     Types: 14 Shots of liquor per week    Drug use: No    Sexual activity: Yes     Partners: Female     Comment: wife   Lifestyle    Physical activity:     Days per week: Not on file     Minutes per session: Not on file    Stress: Not on file   Relationships    Social connections:     Talks on phone: Not on file      Gets together: Not on file     Attends [employer] service: Not on file     Active member of club or organization: Not on file     Attends meetings of clubs or organizations: Not on file     Relationship status: Not on file   Other Topics Concern    Not on file   Social History Narrative    Not on file         Allergies:  Review of patient's allergies indicates:  No Known Allergies    Medications:    Current Facility-Administered Medications:     acetaminophen tablet 650 mg, 650 mg, Per OG tube, Q4H PRN, Freddy Magallon MD    aspirin suppository 300 mg, 300 mg, Rectal, Once PRN, Freddy Magallon MD    aspirin tablet 325 mg, 325 mg, Oral, Daily, Freddy Magallon MD, 325 mg at 04/07/19 0802    atorvastatin tablet 40 mg, 40 mg, Oral, Daily, ARYAN Gunter MD    bisacodyl suppository 10 mg, 10 mg, Rectal, Q12H PRN, Freddy Magallon MD    carvedilol tablet 6.25 mg, 6.25 mg, Oral, BID, Blanca Gómez MD, 6.25 mg at 04/07/19 2057    dextrose 50% injection 12.5 g, 12.5 g, Intravenous, PRN, Garry Murphy, NP    docusate sodium capsule 100 mg, 100 mg, Oral, BID, Freddy Magallon MD, 100 mg at 04/07/19 2056    furosemide injection 40 mg, 40 mg, Intravenous, TID, Blanca Gómez MD, 40 mg at 04/07/19 2056    glucagon (human recombinant) injection 1 mg, 1 mg, Intramuscular, PRN, Garry Murphy, NP    hydrALAZINE injection 10 mg, 10 mg, Intravenous, Q4H PRN, Blanca Gómez MD, 10 mg at 04/07/19 1140    insulin aspart U-100 pen 0-5 Units, 0-5 Units, Subcutaneous, Q6H PRN, Garry Murphy, TIA    magnesium sulfate 2g in water 50mL IVPB (premix), 2 g, Intravenous, PRN, Freddy Magallon MD, 2 g at 04/05/19 2336    magnesium sulfate 2g in water 50mL IVPB (premix), 4 g, Intravenous, PRN, Freddy Magallon MD    metoclopramide HCl injection 5 mg, 5 mg, Intravenous, Q6H PRN, Freddy Magallon MD    mupirocin 2 % ointment 1 g, 1 g, Nasal, BID, Freddy Magallon MD, 1 g at 04/07/19 2057    niCARdipine 40 mg/200 mL infusion, 3 mg/hr, Intravenous, Continuous, Freddy  MD Naun, Stopped at 04/07/19 1400    ondansetron injection 4 mg, 4 mg, Intravenous, Q12H PRN, Freddy Magallon MD    oxyCODONE immediate release tablet 10 mg, 10 mg, Oral, Q4H PRN, Freddy Magallon MD, 10 mg at 04/07/19 2057    oxyCODONE immediate release tablet 5 mg, 5 mg, Oral, Q4H PRN, Freddy Magallon MD, 5 mg at 04/07/19 0755    pantoprazole EC tablet 40 mg, 40 mg, Oral, Daily, Yuri Burks MD    polyethylene glycol packet 17 g, 17 g, Oral, Daily, Freddy Magallon MD, 17 g at 04/07/19 0800    potassium chloride 20 mEq in 100 mL IVPB (FOR CENTRAL LINE ADMINISTRATION ONLY), 20 mEq, Intravenous, PRN, Last Rate: 50 mL/hr at 04/06/19 0152, 20 mEq at 04/06/19 0152 **AND** potassium chloride 40 mEq in 100 mL IVPB (FOR CENTRAL LINE ADMINISTRATION ONLY), 40 mEq, Intravenous, PRN **AND** potassium chloride 20 mEq in 100 mL IVPB (FOR CENTRAL LINE ADMINISTRATION ONLY), 60 mEq, Intravenous, PRN, Freddy Magallon MD    potassium chloride SA CR tablet 40 mEq, 40 mEq, Oral, Once, Blanca Gómez MD    sodium chloride 0.9% flush 10 mL, 10 mL, Intravenous, PRN, Freddy Magallon MD        Physical Exam:  Physical Exam   Constitutional: He is oriented to person, place, and time. He appears well-developed and well-nourished.   HENT:   Head: Normocephalic.   Eyes: Pupils are equal, round, and reactive to light.   Cardiovascular: Normal rate, regular rhythm, normal heart sounds and intact distal pulses.   Pulmonary/Chest: Effort normal and breath sounds normal. No respiratory distress.   Abdominal: Soft. Bowel sounds are normal. He exhibits no distension and no mass. There is no tenderness. There is no guarding.   Neurological: He is alert and oriented to person, place, and time.   Skin: Skin is warm.   Psychiatric: He has a normal mood and affect. His behavior is normal.   Patient's midline incision appears to be healing well, no erythema/drainage.     Vitals:  Vitals:    04/08/19 0515   BP:    Pulse: 98   Resp: 19   Temp:         Labs:  WBC: 9.7  Hgb: 8.9  Hct: 27.8  Platelets: 97    Na+: 139  K+: 3.6  Cl-: 103  HCO3-: 28  BUN: 29  Creatinine: 1.1  Glucose: 95  Calcium: 8.8  Phosphorus: 2.7  Magnesium: 2.0    PT: 9.8  INR: 0.9       Imaging:  CXR 4/7/19: s/p removal of enteric and PA catheters, no detrimental changes.    Assessment/Plan:  Pain control:   -prn oxycodone, patient not currently requiring pain meds.    Cardio:   -MAP goal between 60-80  -Vitals stable  -Continue ASA  -Continue statin  -Continue Carvedilol    Pulmonary:   -chest tube in place; draining 97mL serosanguinous fluid; consider removal today   -patient no longer requiring NC, breathing comfortably on room air    Infectious Disease:   -Afebrile, WBC 9.7    Hematology/Oncology:  -H/H:  8.9/27.8, continue to trend    FEN/GI:  -Patient is tolerating cardiac diet. Continue miralax/docusate, as patient has not yet had BM.     /Renal:   -Vazquez: in place  -BUN 29, Creatinine 1.1, continue to trend.      -UOP:  -2.6 L on furosemide TID.      04/08/2019 This chart was completed by the Medical Student, Luisana Contreras.

## 2019-04-08 NOTE — PT/OT/SLP EVAL
Physical Therapy Evaluation    Patient Name:  Devin Lopez   MRN:  [id number]  Admitting Diagnosis:  S/P aortic valve repair   Recent Surgery: Procedure(s) (LRB):  BENTALL PROCEDURE (N/A) 3 Days Post-Op    Recommendations:     Discharge Recommendations:  home health PT   Discharge Equipment Recommendations: none   Barriers to discharge: Decreased caregiver support    Plan:     During this hospitalization, patient to be seen 4 x/week to address the above listed problems via gait training, therapeutic activities, therapeutic exercises, neuromuscular re-education  · Plan of Care Expires:  05/07/19   Plan of Care Reviewed with: patient    Assessment:     Devin Lopez is a 59 y.o. male admitted with a medical diagnosis of S/P aortic valve repair.  He presents with the following impairments/functional limitations: decreased functional mobility and endurance. Pt's mobility limited by pain. Pt with good strength and balance. Devin Knutson deficits effect their ability to safely and independently participate in self-care tasks and functional mobility which increases their caregiver burden. Due to his physical therapy diagnosis of debility and deconditioning, they continue to benefit from acute PT services to address the following limitations: high fall risk, weakness, instability, and the need for supervised instruction of exercise. Devin Knutson deficits effect their roles and responsibilities in which they were able to complete prior to admit. Education was provided to patient regarding importance of continued participation in therapy, patient's progress and discharge disposition. Pt would benefit from an intensive and collaborative PT/OT/SLP therapy program to improve quality of life and focus on recovery of impairments.     Problem List: weakness, impaired self care skills, impaired endurance, impaired functional mobilty, impaired cardiopulmonary response to activity  Rehab Prognosis: Good     GOALS:  "  Multidisciplinary Problems     Physical Therapy Goals        Problem: Physical Therapy Goal    Goal Priority Disciplines Outcome Goal Variances Interventions   Physical Therapy Goal     PT, PT/OT Ongoing (interventions implemented as appropriate)     Description:  Goals to be met by: 4/20/2019    Patient will increase functional independence with mobility by performing:    Supine <> sit with Supervision.  Sit <> stand transfer with Supervision using No Assistive Device.  Bed <> chair transfer via Stand Pivot with Supervision using No Assistive Device.  Gait  x 150 feet with Supervision using No Assistive Device to prepare for community ambulation and endurance activities.  Able to tolerate exercise for 15-20 reps with independence.                        History:   Living Environment:  Patient lives with his wife in a single family home with no CYNTHIA. Patient reports being independent with mobility & with ADLs. Patient uses DME as follows: none. DME owned (not currently used): none.  Hand Dominance: right    Roles/Repsonsibilities: Work: yes (primarily desk job). Drive: yes. Managing Medicines/Managing Home: yes. Hobbies: none stated.    Upon discharge, patient will have assistance from wife.    Past Medical History:   Diagnosis Date    Hypertension     Murmur        Past Surgical History:   Procedure Laterality Date    CATHETERIZATION, HEART, BOTH LEFT AND RIGHT Bilateral 2/8/2019    Performed by Jarred Alarcon MD at [facility] CATH LAB    CERVICAL SPINE SURGERY      HEART CATH-RIGHT & LEFT Right 2/27/2018    Performed by Jarred Alarcon MD at [facility] CATH LAB    SPINE SURGERY         Subjective     Communicated with RN prior to session.  Patient found seated in bedside chair upon PT entry to room, agreeable to evaluation.    "My left shoulder hurts. I don't really know why."    Pain/Comfort:  · Pain Rating 1: 5/10  · Location - Side 1: Bilateral  · Location - Orientation 1: midline  · Location 1: chest(and " left subclavicular space)  · Pain Addressed 1: Nurse notified, Distraction, Cessation of Activity  · Pain Rating Post-Intervention 1: 5/10    Objective:     Patient found with: blood pressure cuff, pulse ox (continuous), telemetry, chest tube, jackson catheter     General Precautions: Standard, Cardiac fall, sternal   Orthopedic Precautions:N/A   Braces: N/A   Body mass index is 23.68 kg/m².  Respiratory Status: nasal cannula 2L  Vital Signs (Most Recent):    Temp: 99 °F (37.2 °C) (04/08/19 0700)  Pulse: 100 (04/08/19 0900)  Resp: (!) 25 (04/08/19 0900)  BP: 111/81 (04/08/19 0800)  SpO2: 98 % (04/08/19 0900)    Exam:  · Mental Status: Patient is AxOx4 and follows all multi-step verbal commands. Pt is Alert and Cooperative during session.  · Skin Integrity: Visible skin intact  · Edema: none noted  · Sensation: Intact  · Hearing: Intact  · Vision:  Intact  · Postural Assessment: rounded shoulders  · Range of Motion:  · RUE: WFL  · LUE: WFL  · RLE: WFL  · LLE: WFL  · Strength Exam:  · Bilateral Upper Extremity Strength: grossly WFL  · Bilateral Lower Extremity Strength: grossly WFL    Functional Mobility:  Rehab tech present: no  Bed Mobility:   · Pt found/returned to bedside chair    Transfers:   · Sit <> Stand Transfer: minimum assistance with hand-held assist   · Stand <> Sit Transfer: minimum assistance with hand-held assist   · x1 from bedside chair      Gait:  · Patient ambulated: 10ft fwd/bwkd x 2 trials (limited by lines)   · Patient required: contact guard  · Patient used:  HHA  · Gait Pattern observed: reciprocal gait  · Gait Deviation(s): flexed posture and decreased monica  · Impairments due to: pain and impaired endurance    Therapeutic Activities & Exercises:     *Sternal Precautions: patient able to voice 2/3 precautions without assistance. Patient able to maintain precautions throughout session with min verbal cues.    Education:  Patient provided with daily orientation and goals of this PT session.  Patient agreed to participate in session. Patient aware of patient's deficits and therapy progression. They were educated to transfer to bedside chair/bedside commode/bathroom with RN/PCT present. Encouraged patient to perform daily exercises & mobility to increase endurance and decrease effects of bedrest. Time provided for therapeutic counseling and discussion of health disposition. All questions answered to patient's satisfaction, within scope of PT practice; voiced no other concerns. White board updated in patient's room, RN notified of session.    Patient left up in chair, with head in midline, neutral pelvis & heels floated for skin protection with all lines intact, call button in reach and RN notified.    AM-PAC 6 CLICK MOBILITY  Total Score:18     Time Tracking:     PT Received On: 04/08/19  PT Start Time: 0853     PT Stop Time: 0906  PT Total Time (min): 13 min     Billable Minutes: Evaluation 13    Maritza Garza PT, DPT  04/08/2019  Pager:[phone]

## 2019-04-08 NOTE — SUBJECTIVE & OBJECTIVE
Interval History/Significant Events: no events overnight, off oxygen, tolerating diet    Follow-up For: Procedure(s) (LRB):  BENTALL PROCEDURE (N/A)    Post-Operative Day: 3 Days Post-Op    Objective:     Vital Signs (Most Recent):  Temp: 99.6 °F (37.6 °C) (04/08/19 0300)  Pulse: 100 (04/08/19 0615)  Resp: (!) 26 (04/08/19 0615)  BP: 100/73 (04/08/19 0600)  SpO2: (!) 91 % (04/08/19 0615) Vital Signs (24h Range):  Temp:  [99 °F (37.2 °C)-100.3 °F (37.9 °C)] 99.6 °F (37.6 °C)  Pulse:  [] 100  Resp:  [14-44] 26  SpO2:  [90 %-100 %] 91 %  BP: ()/(58-89) 100/73  Arterial Line BP: ()/(34-67) 120/61     Weight: 77 kg (169 lb 12.1 oz)  Body mass index is 23.68 kg/m².      Intake/Output Summary (Last 24 hours) at 4/8/2019 0627  Last data filed at 4/8/2019 0600  Gross per 24 hour   Intake 182 ml   Output 2872 ml   Net -2690 ml       Physical Exam   Constitutional: He is oriented to person, place, and time. He appears well-developed and well-nourished. No distress.   Cardiovascular: Normal rate and regular rhythm.   Midline incision covered with steri strips. CT in place with ss output    Pulmonary/Chest: Effort normal. No respiratory distress.   Room air   Abdominal: Soft. He exhibits no distension. There is no tenderness.   Genitourinary:   Genitourinary Comments: Vazquez in place   Musculoskeletal: Normal range of motion.   Neurological: He is alert and oriented to person, place, and time.   Skin: Skin is warm and dry. He is not diaphoretic.           Lines/Drains/Airways     Drain                 Urethral Catheter 04/05/19 0735 Non-latex;Temperature probe 14 Fr. 2 days         Y Chest Tube 1 and 2 04/05/19 1111 1 Anterior Mediastinal 19 Fr. Posterior Mediastinal 19 Fr. 2 days          Line                 Pacer Wires 04/05/19 1318 2 days          Peripheral Intravenous Line                 Peripheral IV - Single Lumen 04/05/19 0550 Right Forearm 3 days         Peripheral IV - Single Lumen 04/05/19 0745 Left  Upper Arm 2 days                Significant Labs:    CBC/Anemia Profile:  Recent Labs   Lab 04/06/19 1452 04/07/19 0317 04/08/19  0343   WBC 8.52 9.40  9.40 9.70  9.70   HGB 9.3* 8.8*  8.8* 8.9*  8.9*   HCT 27.4* 26.1*  26.1* 27.8*  27.8*   PLT 77* 80*  80* 97*  97*   MCV 95 95  95 99*  99*   RDW 14.0 13.8  13.8 13.5  13.5        Chemistries:  Recent Labs   Lab 04/06/19 2005 04/07/19 0317 04/07/19  0800 04/07/19 2004 04/08/19  0343    137  137  --   --  139  139   K 4.1  4.1 3.9  3.9 4.2 4.0 3.6  3.6    104  104  --   --  103  103   CO2 24 27  27  --   --  28  28   BUN 16 18  18  --   --  29*  29*   CREATININE 0.9 0.9  0.9  --   --  1.1  1.1   CALCIUM 8.7 8.9  8.9  --   --  8.8  8.8   MG 2.2 2.2  2.2  --   --  2.0  2.0   PHOS 2.1* 3.0  3.0  --   --  2.7  2.7       All pertinent labs within the past 24 hours have been reviewed.    Significant Imaging:  I have reviewed all pertinent imaging results/findings within the past 24 hours.

## 2019-04-08 NOTE — ASSESSMENT & PLAN NOTE
60 yo male s/p Bentall procedure on 4/5     Plan:  Neuro:   -Awake and alert.   -Pain control: prn oxycodone, prn dilaudid      Pulmonary:   -Extubated on 4/6  -On 1L NC wean as tolerated  -CXR daily   -IS, duonebs    Cardiac:  -MAP: 60-80 goal  -Cardene off. PRN bp medications started.   -Not requiring pressor support   -CT with ss output, will monitor output. Remove pending attending discussion  -ASA, statin    -Coreg 6.25 BID  -prn hydralazine     FEN/GI:  -Cardiac diet advance   -Bowel regimen: miralax, docusate  -Protonix 40mg daily      /Renal:   - Vazquez: removed this AM  - BUN/Cr: stable, will trend  - UOP: 2.6L; net -1.0 L  - lasix 40 bid     Infectious Disease:   -Afebrile  -WBC: 9.7, will trend   -Antibiotics: ancef ziggy-op complete  -Cultures: none      Hematology/Oncology:  -H/H: stable, will trend   -required no blood products overnight   -ASA, statin     Endocrine:  -BG: insulin gtt off, SSI     Dispo:CTS primary. Transfer to [facility] today

## 2019-04-08 NOTE — PLAN OF CARE
Problem: Physical Therapy Goal  Goal: Physical Therapy Goal  Goals to be met by: 4/20/2019    Patient will increase functional independence with mobility by performing:    Supine <> sit with Supervision.  Sit <> stand transfer with Supervision using No Assistive Device.  Bed <> chair transfer via Stand Pivot with Supervision using No Assistive Device.  Gait  x 150 feet with Supervision using No Assistive Device to prepare for community ambulation and endurance activities.  Able to tolerate exercise for 15-20 reps with independence.      Outcome: Ongoing (interventions implemented as appropriate)    Pt would benefit from HHPT upon discharge.  Appropriate transfer level with nursing staff: Bed <> Chair:  Stand Pivot with minimum assistance.    Maritza Garza PT, DPT  4/8/2019  Pager: [phone]

## 2019-04-08 NOTE — PROGRESS NOTES
[facility]  Critical Care - Surgery  Progress Note    Patient Name: Devin Lopez  MRN: [id number]  Admission Date: 4/5/2019  Hospital Length of Stay: 3 days  Code Status: Full Code  Attending Provider: Yuri Burks MD  Primary Care Provider: Gage Jaimes Jr, MD (Inactive)   Principal Problem: S/P aortic valve repair    Subjective:     Hospital/ICU Course:  No notes on file      Interval History/Significant Events: no events overnight, off oxygen, tolerating diet    Follow-up For: Procedure(s) (LRB):  BENTALL PROCEDURE (N/A)    Post-Operative Day: 3 Days Post-Op    Objective:     Vital Signs (Most Recent):  Temp: 99.6 °F (37.6 °C) (04/08/19 0300)  Pulse: 100 (04/08/19 0615)  Resp: (!) 26 (04/08/19 0615)  BP: 100/73 (04/08/19 0600)  SpO2: (!) 91 % (04/08/19 0615) Vital Signs (24h Range):  Temp:  [99 °F (37.2 °C)-100.3 °F (37.9 °C)] 99.6 °F (37.6 °C)  Pulse:  [] 100  Resp:  [14-44] 26  SpO2:  [90 %-100 %] 91 %  BP: ()/(58-89) 100/73  Arterial Line BP: ()/(34-67) 120/61     Weight: 77 kg (169 lb 12.1 oz)  Body mass index is 23.68 kg/m².      Intake/Output Summary (Last 24 hours) at 4/8/2019 0627  Last data filed at 4/8/2019 0600  Gross per 24 hour   Intake 182 ml   Output 2872 ml   Net -2690 ml       Physical Exam   Constitutional: He is oriented to person, place, and time. He appears well-developed and well-nourished. No distress.   Cardiovascular: Normal rate and regular rhythm.   Midline incision covered with steri strips. CT in place with ss output    Pulmonary/Chest: Effort normal. No respiratory distress.   Room air   Abdominal: Soft. He exhibits no distension. There is no tenderness.   Genitourinary:   Genitourinary Comments: Vazquez in place   Musculoskeletal: Normal range of motion.   Neurological: He is alert and oriented to person, place, and time.   Skin: Skin is warm and dry. He is not diaphoretic.           Lines/Drains/Airways     Drain                 Urethral  Catheter 04/05/19 0735 Non-latex;Temperature probe 14 Fr. 2 days         Y Chest Tube 1 and 2 04/05/19 1111 1 Anterior Mediastinal 19 Fr. Posterior Mediastinal 19 Fr. 2 days          Line                 Pacer Wires 04/05/19 1318 2 days          Peripheral Intravenous Line                 Peripheral IV - Single Lumen 04/05/19 0550 Right Forearm 3 days         Peripheral IV - Single Lumen 04/05/19 0745 Left Upper Arm 2 days                Significant Labs:    CBC/Anemia Profile:  Recent Labs   Lab 04/06/19 1452 04/07/19 0317 04/08/19  0343   WBC 8.52 9.40  9.40 9.70  9.70   HGB 9.3* 8.8*  8.8* 8.9*  8.9*   HCT 27.4* 26.1*  26.1* 27.8*  27.8*   PLT 77* 80*  80* 97*  97*   MCV 95 95  95 99*  99*   RDW 14.0 13.8  13.8 13.5  13.5        Chemistries:  Recent Labs   Lab 04/06/19 2005 04/07/19 0317 04/07/19  0800 04/07/19 2004 04/08/19  0343    137  137  --   --  139  139   K 4.1  4.1 3.9  3.9 4.2 4.0 3.6  3.6    104  104  --   --  103  103   CO2 24 27  27  --   --  28  28   BUN 16 18  18  --   --  29*  29*   CREATININE 0.9 0.9  0.9  --   --  1.1  1.1   CALCIUM 8.7 8.9  8.9  --   --  8.8  8.8   MG 2.2 2.2  2.2  --   --  2.0  2.0   PHOS 2.1* 3.0  3.0  --   --  2.7  2.7       All pertinent labs within the past 24 hours have been reviewed.    Significant Imaging:  I have reviewed all pertinent imaging results/findings within the past 24 hours.    Assessment/Plan:     * S/P aortic valve repair  59 y.o. male w/ a significant PMHx of HTN, AI, and ascending aortic aneurysm (max diameter of ascending aorta is 4.3 cm) who underwent Bentall Procedure on 04/05. Extubated 4/6/19.     Neuro:   - fully oriented  - PRN pain medications, not requesting      Pulmonary:   - extubated 4/6  - room air     Cardiac:   - S/p Bentall Procedure on 04/05  - Epi and Norepi gtts weaned off.   - Cardene gtt @ 2.5mg/h to maintain map <80, turned off yesterday     Renal:    - Vazquez in place  - Monitor UOP -  80-200m/hr with furosemide 40 TID  - BUN/Cr wnl       Intake/Output Summary (Last 24 hours) at 4/8/2019 0629  Last data filed at 4/8/2019 0600  Gross per 24 hour   Intake 182 ml   Output 2872 ml   Net -2690 ml       ID:   - Febrile overnight Tmax 100.3  - No leukocytosis, will continue to monitor   - Ancef post op per protocol     Hem/Onc:   - Chest tube put out approx 1500 cc upon arrival to SICU, but now significantly decreased to 5ml/hr  - H/H stable at this time, Hgb 8.9  - Will continue to monitor     Endocrine:    - low dose SSI  - Monitor BG, at goal     Fluids/Electrolytes/Nutrition/GI:   - Replace electrolytes PRN  - tolerating cardiac diet     PPx:  pantoprazole    DISPO:  SICU with step down orders in place  Primary: HEIDY Vallecillo MD  Critical Care - Surgery  [facility]

## 2019-04-08 NOTE — PROGRESS NOTES
"[facility]  Endocrinology  Progress Note    Admit Date: 2019     Reason for Consult: Management of Hyperglycemia     Surgical Procedure and Date: 2019 - Bentall Procedure.    HPI:   Patient is a 59 y.o. male with a diagnosis of HTN and AI. Is now S/P Bentall Procedure. No history of DM noted on file. Endocrinology consulted to manage hyperglycemia s/p cardiac procedure.     Lab Results   Component Value Date    HGBA1C 5.5 2019       Interval HPI:   Overnight events:   NAEON. BG remains within goal without the need for SQ insulin therapy.     Eatin%  Nausea: No  Hypoglycemia and intervention: No  Fever: No  TPN and/or TF: No    If yes, type of TF/TPN and rate: None    /71   Pulse 96   Temp (!) 100.5 °F (38.1 °C) (Core ([place]))   Resp (!) 21   Ht 5' 11" (1.803 m)   Wt 77.3 kg (170 lb 6.7 oz)   SpO2 98%   BMI 23.77 kg/m²      Labs Reviewed and Include    Recent Labs   Lab 19  2201 19  0332 19  0845    113*  113*  --    CALCIUM 8.4* 8.1*  8.1*  --    ALBUMIN 3.3*  --   --    PROT 5.4*  --   --     142  142  --    K 3.9 4.4  4.4 4.4   CO2 28 28  28  --     107  107  --    BUN 13 13  13  --    CREATININE 0.9 1.1  1.1  --    ALKPHOS 46*  --   --    ALT 16  --   --    AST 37  --   --    BILITOT 3.4*  --   --      Lab Results   Component Value Date    WBC 5.91 2019    WBC 5.91 2019    HGB 8.9 (L) 2019    HGB 8.9 (L) 2019    HCT 26.3 (L) 2019    HCT 26.3 (L) 2019    MCV 95 2019    MCV 95 2019    PLT 73 (L) 2019    PLT 73 (L) 2019     No results for input(s): TSH, FREET4 in the last 168 hours.  Lab Results   Component Value Date    HGBA1C 5.5 2019       Nutritional status:   Body mass index is 23.77 kg/m².  Lab Results   Component Value Date    ALBUMIN 3.3 (L) 2019    ALBUMIN 4.2 2019    ALBUMIN 4.0 2018     No results found for: " PREALBUMIN    Estimated Creatinine Clearance: 76.1 mL/min (based on SCr of 1.1 mg/dL).    Accu-Checks  Recent Labs     04/05/19  2158 04/05/19  2316 04/06/19  0011 04/06/19  0103 04/06/19  0151 04/06/19  0312 04/06/19  0413 04/06/19  0508 04/06/19  0545 04/06/19  0846   POCTGLUCOSE 108 110 111* 111* 108 120* 112* 103 108 127*       Current Medications and/or Treatments Impacting Glycemic Control  Immunotherapy:    Immunosuppressants     None        Steroids:   Hormones (From admission, onward)    None        Pressors:    Autonomic Drugs (From admission, onward)    None        Hyperglycemia/Diabetes Medications:   Antihyperglycemics (From admission, onward)    Start     Stop Route Frequency Ordered    04/05/19 1530  insulin regular (Humulin R) 100 Units in sodium chloride 0.9% 100 mL infusion     Question:  Insulin rate changes (DO NOT MODIFY ANSWER)  Answer:  [email or web address]    -- IV Continuous 04/05/19 1517          ASSESSMENT and PLAN    * S/P aortic valve repair  Managed per primary team  Avoid hypoglycemia        Stress hyperglycemia  SIGNOFF  BG goal 140 - 180     BG is below goal without the need for insulin therapy. No dx of DM. Tolerating PO foods.   Discontinue BG monitoring. Will sign off. Please re-consult Endo as needed.     Thank you for the consult.     ** Please call Endocrine for any BG related issues **                Garry Murphy, NP  Endocrinology  [facility]

## 2019-04-08 NOTE — ASSESSMENT & PLAN NOTE
59 y.o. male w/ a significant PMHx of HTN, AI, and ascending aortic aneurysm (max diameter of ascending aorta is 4.3 cm) who underwent Bentall Procedure on 04/05. Extubated 4/6/19.     Neuro:   - fully oriented  - PRN pain medications, not requesting      Pulmonary:   - extubated 4/6  - room air     Cardiac:   - S/p Bentall Procedure on 04/05  - Epi and Norepi gtts weaned off.   - Cardene gtt @ 2.5mg/h to maintain map <80, turned off yesterday     Renal:    - Vazquez in place  - Monitor UOP - 80-200m/hr with furosemide 40 TID  - BUN/Cr wnl       Intake/Output Summary (Last 24 hours) at 4/8/2019 0629  Last data filed at 4/8/2019 0600  Gross per 24 hour   Intake 182 ml   Output 2872 ml   Net -2690 ml       ID:   - Febrile overnight Tmax 100.3  - No leukocytosis, will continue to monitor   - Ancef post op per protocol     Hem/Onc:   - Chest tube put out approx 1500 cc upon arrival to SICU, but now significantly decreased to 5ml/hr  - H/H stable at this time, Hgb 8.9  - Will continue to monitor     Endocrine:    - low dose SSI  - Monitor BG, at goal     Fluids/Electrolytes/Nutrition/GI:   - Replace electrolytes PRN  - tolerating cardiac diet     PPx:  pantoprazole    DISPO:  SICU with step down orders in place  Primary: CTS

## 2019-04-08 NOTE — PLAN OF CARE
Problem: Adult Inpatient Plan of Care  Goal: Plan of Care Review  Outcome: Ongoing (interventions implemented as appropriate)  AAOx4. VSS throughout shift. Currently on RA sats >90%. No gtts infusing. Vazquez intact with adequate urine output. Tolerating regular diet with 1500 ml fluid restriction. Pain managed with PRN regimen. Plan of care reviewed with pt. DERIAN.

## 2019-04-08 NOTE — SUBJECTIVE & OBJECTIVE
Interval History: No acute events overnight. Urine output 2.6L with lasix 40 TID. No pressors. On ASA, statin and carvedilol.    Medications:  Continuous Infusions:  Scheduled Meds:   aspirin  325 mg Oral Daily    atorvastatin  40 mg Oral Daily    carvedilol  6.25 mg Oral BID    docusate sodium  100 mg Oral BID    furosemide  40 mg Intravenous TID    mupirocin  1 g Nasal BID    pantoprazole  40 mg Oral Daily    polyethylene glycol  17 g Oral Daily     PRN Meds:acetaminophen, aspirin, bisacodyl, dextrose 50%, glucagon (human recombinant), hydrALAZINE, insulin aspart U-100, magnesium sulfate IVPB, magnesium sulfate IVPB, metoclopramide HCl, ondansetron, oxyCODONE, oxyCODONE, potassium chloride in water **AND** potassium chloride in water **AND** potassium chloride in water, sodium chloride 0.9%     Review of patient's allergies indicates:  No Known Allergies  Objective:     Vital Signs (Most Recent):  Temp: 99.6 °F (37.6 °C) (04/08/19 0300)  Pulse: 98 (04/08/19 0645)  Resp: (!) 21 (04/08/19 0645)  BP: 107/82 (04/08/19 0630)  SpO2: (!) 90 % (04/08/19 0645) Vital Signs (24h Range):  Temp:  [99 °F (37.2 °C)-100.3 °F (37.9 °C)] 99.6 °F (37.6 °C)  Pulse:  [] 98  Resp:  [14-44] 21  SpO2:  [90 %-100 %] 90 %  BP: ()/(58-89) 107/82  Arterial Line BP: (116-123)/(59-63) 120/61     Weight: 77 kg (169 lb 12.1 oz)  Body mass index is 23.68 kg/m².    Intake/Output - Last 3 Shifts       04/06 0700 - 04/07 0659 04/07 0700 - 04/08 0659 04/08 0700 - 04/09 0659    P.O. 545      I.V. (mL/kg) 685 (8.9) 182 (2.4)     Blood       NG/      Total Intake(mL/kg) 1380 (17.9) 182 (2.4)     Urine (mL/kg/hr) 3925 (2.1) 2715 (1.5)     Drains       Chest Tube 185 157     Total Output 4110 5150     Net -0653 -6115                  Physical Exam   Constitutional: He is oriented to person, place, and time. He appears well-developed and well-nourished. No distress.   Cardiovascular: Normal rate and regular rhythm.   Midline  incision covered with island dressing. CT in place with ss output    Pulmonary/Chest: Effort normal. No respiratory distress.   Abdominal: Soft. He exhibits no distension. There is no tenderness.   Genitourinary:   Genitourinary Comments: Vazquez in place   Musculoskeletal: Normal range of motion.   Neurological: He is alert and oriented to person, place, and time.   Skin: Skin is warm and dry. He is not diaphoretic.       Significant Labs:  CBC:   Recent Labs   Lab 04/08/19  0343   WBC 9.70  9.70   RBC 2.81*  2.81*   HGB 8.9*  8.9*   HCT 27.8*  27.8*   PLT 97*  97*   MCV 99*  99*   MCH 31.7*  31.7*   MCHC 32.0  32.0     CMP:   Recent Labs   Lab 04/05/19  2201 04/08/19  0343      < > 95  95   CALCIUM 8.4*   < > 8.8  8.8   ALBUMIN 3.3*  --   --    PROT 5.4*  --   --       < > 139  139   K 3.9   < > 3.6  3.6   CO2 28   < > 28  28      < > 103  103   BUN 13   < > 29*  29*   CREATININE 0.9   < > 1.1  1.1   ALKPHOS 46*  --   --    ALT 16  --   --    AST 37  --   --    BILITOT 3.4*  --   --     < > = values in this interval not displayed.     Coagulation:   Recent Labs   Lab 04/08/19  0343   LABPROT 9.8   INR 0.9   APTT 24.3       Significant Diagnostics:  I have reviewed all pertinent imaging results/findings within the past 24 hours.

## 2019-04-09 LAB
ANION GAP SERPL CALC-SCNC: 7 MMOL/L (ref 8–16)
APTT BLDCRRT: 24.4 SEC (ref 21–32)
BASOPHILS # BLD AUTO: 0.02 K/UL (ref 0–0.2)
BASOPHILS NFR BLD: 0.2 % (ref 0–1.9)
BLD PROD TYP BPU: NORMAL
BLOOD UNIT EXPIRATION DATE: NORMAL
BLOOD UNIT TYPE CODE: 6200
BLOOD UNIT TYPE: NORMAL
BUN SERPL-MCNC: 34 MG/DL (ref 6–20)
CALCIUM SERPL-MCNC: 9.2 MG/DL (ref 8.7–10.5)
CHLORIDE SERPL-SCNC: 99 MMOL/L (ref 95–110)
CO2 SERPL-SCNC: 30 MMOL/L (ref 23–29)
CODING SYSTEM: NORMAL
CREAT SERPL-MCNC: 1.1 MG/DL (ref 0.5–1.4)
DIFFERENTIAL METHOD: ABNORMAL
DISPENSE STATUS: NORMAL
EOSINOPHIL # BLD AUTO: 0.1 K/UL (ref 0–0.5)
EOSINOPHIL NFR BLD: 1.2 % (ref 0–8)
ERYTHROCYTE [DISTWIDTH] IN BLOOD BY AUTOMATED COUNT: 13.2 % (ref 11.5–14.5)
EST. GFR  (AFRICAN AMERICAN): >60 ML/MIN/1.73 M^2
EST. GFR  (NON AFRICAN AMERICAN): >60 ML/MIN/1.73 M^2
GLUCOSE SERPL-MCNC: 98 MG/DL (ref 70–110)
HCT VFR BLD AUTO: 27 % (ref 40–54)
HGB BLD-MCNC: 9.1 G/DL (ref 14–18)
IMM GRANULOCYTES # BLD AUTO: 0.03 K/UL (ref 0–0.04)
IMM GRANULOCYTES NFR BLD AUTO: 0.3 % (ref 0–0.5)
INR PPP: 1 (ref 0.8–1.2)
LYMPHOCYTES # BLD AUTO: 1.1 K/UL (ref 1–4.8)
LYMPHOCYTES NFR BLD: 12.5 % (ref 18–48)
MAGNESIUM SERPL-MCNC: 1.7 MG/DL (ref 1.6–2.6)
MCH RBC QN AUTO: 32.6 PG (ref 27–31)
MCHC RBC AUTO-ENTMCNC: 33.7 G/DL (ref 32–36)
MCV RBC AUTO: 97 FL (ref 82–98)
MONOCYTES # BLD AUTO: 0.9 K/UL (ref 0.3–1)
MONOCYTES NFR BLD: 9.8 % (ref 4–15)
NEUTROPHILS # BLD AUTO: 6.9 K/UL (ref 1.8–7.7)
NEUTROPHILS NFR BLD: 76 % (ref 38–73)
NRBC BLD-RTO: 0 /100 WBC
PHOSPHATE SERPL-MCNC: 3.8 MG/DL (ref 2.7–4.5)
PLATELET # BLD AUTO: 128 K/UL (ref 150–350)
PMV BLD AUTO: 10.8 FL (ref 9.2–12.9)
POTASSIUM SERPL-SCNC: 3.9 MMOL/L (ref 3.5–5.1)
PROTHROMBIN TIME: 10 SEC (ref 9–12.5)
RBC # BLD AUTO: 2.79 M/UL (ref 4.6–6.2)
SODIUM SERPL-SCNC: 136 MMOL/L (ref 136–145)
TRANS ERYTHROCYTES VOL PATIENT: NORMAL ML
WBC # BLD AUTO: 9.1 K/UL (ref 3.9–12.7)

## 2019-04-09 PROCEDURE — 25000003 PHARM REV CODE 250: Performed by: THORACIC SURGERY (CARDIOTHORACIC VASCULAR SURGERY)

## 2019-04-09 PROCEDURE — 85025 COMPLETE CBC W/AUTO DIFF WBC: CPT

## 2019-04-09 PROCEDURE — 80048 BASIC METABOLIC PNL TOTAL CA: CPT

## 2019-04-09 PROCEDURE — 97535 SELF CARE MNGMENT TRAINING: CPT

## 2019-04-09 PROCEDURE — 63600175 PHARM REV CODE 636 W HCPCS: Performed by: STUDENT IN AN ORGANIZED HEALTH CARE EDUCATION/TRAINING PROGRAM

## 2019-04-09 PROCEDURE — 25000003 PHARM REV CODE 250: Performed by: STUDENT IN AN ORGANIZED HEALTH CARE EDUCATION/TRAINING PROGRAM

## 2019-04-09 PROCEDURE — 83735 ASSAY OF MAGNESIUM: CPT

## 2019-04-09 PROCEDURE — 84100 ASSAY OF PHOSPHORUS: CPT

## 2019-04-09 PROCEDURE — 25000003 PHARM REV CODE 250: Performed by: PHYSICIAN ASSISTANT

## 2019-04-09 PROCEDURE — 20600001 HC STEP DOWN PRIVATE ROOM

## 2019-04-09 PROCEDURE — 85730 THROMBOPLASTIN TIME PARTIAL: CPT

## 2019-04-09 PROCEDURE — 85610 PROTHROMBIN TIME: CPT

## 2019-04-09 PROCEDURE — 36415 COLL VENOUS BLD VENIPUNCTURE: CPT

## 2019-04-09 PROCEDURE — 97116 GAIT TRAINING THERAPY: CPT

## 2019-04-09 PROCEDURE — 97530 THERAPEUTIC ACTIVITIES: CPT

## 2019-04-09 RX ORDER — LANOLIN ALCOHOL/MO/W.PET/CERES
800 CREAM (GRAM) TOPICAL ONCE
Status: COMPLETED | OUTPATIENT
Start: 2019-04-09 | End: 2019-04-09

## 2019-04-09 RX ORDER — FUROSEMIDE 10 MG/ML
20 INJECTION INTRAMUSCULAR; INTRAVENOUS ONCE
Status: COMPLETED | OUTPATIENT
Start: 2019-04-09 | End: 2019-04-09

## 2019-04-09 RX ADMIN — OXYCODONE HYDROCHLORIDE 10 MG: 10 TABLET ORAL at 09:04

## 2019-04-09 RX ADMIN — POLYETHYLENE GLYCOL 3350 17 G: 17 POWDER, FOR SOLUTION ORAL at 09:04

## 2019-04-09 RX ADMIN — OXYCODONE HYDROCHLORIDE 5 MG: 5 TABLET ORAL at 03:04

## 2019-04-09 RX ADMIN — FUROSEMIDE 20 MG: 10 INJECTION, SOLUTION INTRAMUSCULAR; INTRAVENOUS at 09:04

## 2019-04-09 RX ADMIN — PANTOPRAZOLE SODIUM 40 MG: 40 TABLET, DELAYED RELEASE ORAL at 09:04

## 2019-04-09 RX ADMIN — FUROSEMIDE 40 MG: 10 INJECTION, SOLUTION INTRAMUSCULAR; INTRAVENOUS at 02:04

## 2019-04-09 RX ADMIN — DOCUSATE SODIUM 100 MG: 100 CAPSULE, LIQUID FILLED ORAL at 09:04

## 2019-04-09 RX ADMIN — ATORVASTATIN CALCIUM 40 MG: 20 TABLET, FILM COATED ORAL at 09:04

## 2019-04-09 RX ADMIN — ASPIRIN 325 MG ORAL TABLET 325 MG: 325 PILL ORAL at 09:04

## 2019-04-09 RX ADMIN — MAGNESIUM OXIDE TAB 400 MG (241.3 MG ELEMENTAL MG) 800 MG: 400 (241.3 MG) TAB at 09:04

## 2019-04-09 RX ADMIN — FUROSEMIDE 40 MG: 10 INJECTION, SOLUTION INTRAMUSCULAR; INTRAVENOUS at 09:04

## 2019-04-09 NOTE — PT/OT/SLP PROGRESS
Occupational Therapy   Treatment    Name: Devin Lopez  MRN: [id number]  Admitting Diagnosis:  S/P aortic valve repair  4 Days Post-Op    Recommendations:     Discharge Recommendations: home health OT, home health PT  Discharge Equipment Recommendations:  shower chair  Barriers to discharge:  None    Assessment:     Devin Lopez is a 59 y.o. male with a medical diagnosis of S/P aortic valve repair.  He presents with decreased overall strength and endurance affecting ADL (I). Performance deficits affecting function are weakness, gait instability, decreased upper extremity function, impaired balance, impaired endurance, impaired self care skills, impaired functional mobilty.     Rehab Prognosis:  Good; patient would benefit from acute skilled OT services to address these deficits and reach maximum level of function.       Plan:     Patient to be seen 4 x/week to address the above listed problems via self-care/home management, therapeutic activities, therapeutic exercises  · Plan of Care Expires: 05/07/19  · Plan of Care Reviewed with: patient    Subjective     Pain/Comfort:  · Pain Rating 1: 0/10  · Pain Rating Post-Intervention 1: 0/10    Objective:     Communicated with:  RN prior to session.  Patient found up in chair with telemetry upon OT entry to room.    General Precautions: Standard, fall, sternal   Orthopedic Precautions:    Braces:       Occupational Performance:     Bed Mobility:    · NT     Functional Mobility/Transfers:  · Patient completed Sit <> Stand Transfer with minimum assistance  with  no assistive device from b/s chair  · Patient completed Toilet Transfer Step Transfer and sit<>stand technique with minimum assistance with  no AD  · Functional Mobility: CGA to/from bathroom and in hallway to simulate HH distance for increased ADL (I)    Activities of Daily Living:  · Grooming: set-up A seated; CGA in standing at sink    · Upper Body Dressing: minimum assistance    · Lower Body Dressing: moderate  assistance    · Toileting: contact guard assistance toilet level for pericare; SBA using urinal seated    AMPAC 6 Click ADL: 19    Treatment & Education:  Pt ed on OT POC  Pt re-ed on sternal precautions during ADL  Pt ed on importance of continued ADL performance and mobility with staff    Patient left up in chair with all lines intact, call button in reach and RN notifiedEducation:      GOALS:   Multidisciplinary Problems     Occupational Therapy Goals        Problem: Occupational Therapy Goal    Goal Priority Disciplines Outcome Interventions   Occupational Therapy Goal     OT, PT/OT Ongoing (interventions implemented as appropriate)    Description:  Goals to be met by: 4/14/19     Patient will increase functional independence with ADLs by performing:    UE Dressing with Supervision.  LE Dressing with Supervision.  Grooming while standing at sink with Supervision.  Toileting from toilet with Supervision for hygiene and clothing management.   Toilet transfers with Supervision.                      Time Tracking:     OT Date of Treatment: 04/09/19  OT Start Time: 1300  OT Stop Time: 1324  OT Total Time (min): 24 min    Billable Minutes:Self Care/Home Management 14  Therapeutic Activity 10    SERGIO Esqueda  4/9/2019

## 2019-04-09 NOTE — ASSESSMENT & PLAN NOTE
58 yo male s/p Bentall procedure on 4/5     Plan:  Neuro:   -Awake and alert.   -Pain control: prn oxycodone, prn dilaudid      Pulmonary:   -Extubated on 4/6  -On 1L NC wean as tolerated  -CXR daily   -IS, duonebs    Cardiac:  -MAP: 60-80 goal  -Cardene off. PRN bp medications started.   -Not requiring pressor support   -CT removed   -ASA, statin    -Coreg 6.25 BID - hold one dose today MAP 69 and BP 92/55  -prn hydralazine     FEN/GI:  -Cardiac diet advance   -Bowel regimen: miralax, docusate  -Protonix 40mg daily      /Renal:   - Vazquez: removed 4/8  - BUN/Cr: stable, will trend  - UOP adequate - continue to monitor   - lasix 40 TID     Infectious Disease:   -Afebrile  -WBC: 9.10 from 9.7, will trend   -Antibiotics: ancef ziggy-op complete  -Cultures: none      Hematology/Oncology:  -H/H: stable, will trend   -required no blood products overnight   -ASA, statin     Endocrine:  -BG: insulin gtt off, SSI     Dispo:CTS primary. D/C 1-2 days

## 2019-04-09 NOTE — PLAN OF CARE
Problem: Adult Inpatient Plan of Care  Goal: Plan of Care Review  Outcome: Ongoing (interventions implemented as appropriate)  Patient transferred from SICU early this morning at 0340. S/P Aortic valve repair. Post op day 4. Midsternal and old CT site dressing CDI. VSS. PRN pain medication provided for incisional pain. Incentive spirometer use encouraged. Fall risk precautions reviewed and maintained. Plan of care reviewed with patient. Patient verbalized understanding. All questions encouraged and answered. Will continue to monitor.

## 2019-04-09 NOTE — PROGRESS NOTES
[facility]  Cardiothoracic Surgery  Progress Note    Patient Name: Devin Lopez  MRN: [id number]  Admission Date: 4/5/2019  Hospital Length of Stay: 4 days  Code Status: Full Code   Attending Physician: Yuri Burks MD   Referring Provider: Yuri Burks MD  Principal Problem:S/P aortic valve repair            Subjective:     Post-Op Info:  Procedure(s) (LRB):  BENTALL PROCEDURE (N/A)   4 Days Post-Op     Interval History: NAEON. Pt states he has not walked much, only to the bathroom and back to the chair. Will work on increasing ambulation.     Review of Systems   Constitution: Negative for malaise/fatigue.   Cardiovascular: Positive for dyspnea on exertion. Negative for chest pain, claudication, leg swelling and palpitations.   Respiratory: Negative for cough and shortness of breath.    Hematologic/Lymphatic: Negative for bleeding problem.   Gastrointestinal: Negative for abdominal pain.   Genitourinary: Negative for dysuria.   Neurological: Negative for headaches and weakness.     Medications:  Continuous Infusions:  Scheduled Meds:   aspirin  325 mg Oral Daily    atorvastatin  40 mg Oral Daily    carvedilol  6.25 mg Oral BID    docusate sodium  100 mg Oral BID    furosemide  40 mg Intravenous TID    mupirocin  1 g Nasal BID    pantoprazole  40 mg Oral Daily    polyethylene glycol  17 g Oral Daily     PRN Meds:acetaminophen, aspirin, bisacodyl, hydrALAZINE, metoclopramide HCl, ondansetron, oxyCODONE, oxyCODONE, sodium chloride 0.9%     Objective:     Vital Signs (Most Recent):  Temp: 98.2 °F (36.8 °C) (04/09/19 0917)  Pulse: 102 (04/09/19 1100)  Resp: 20 (04/09/19 0917)  BP: (!) 92/55 (04/09/19 0917)  SpO2: (!) 94 % (04/09/19 0917) Vital Signs (24h Range):  Temp:  [98.2 °F (36.8 °C)-100.2 °F (37.9 °C)] 98.2 °F (36.8 °C)  Pulse:  [] 102  Resp:  [17-29] 20  SpO2:  [91 %-100 %] 94 %  BP: ()/(55-86) 92/55     Weight: 64.3 kg (141 lb 10.3 oz)  Body mass index is  19.76 kg/m².    SpO2: (!) 94 %  O2 Device (Oxygen Therapy): room air    Intake/Output - Last 3 Shifts       04/07 0700 - 04/08 0659 04/08 0700 - 04/09 0659 04/09 0700 - 04/10 0659    P.O.  1270     I.V. (mL/kg) 182 (2.4)      NG/GT       Total Intake(mL/kg) 182 (2.4) 1270 (16.5)     Urine (mL/kg/hr) 2715 (1.5) 1550 (0.8)     Chest Tube 157 20     Total Output 2872 1570     Net -2690 -300            Urine Occurrence  1 x           Lines/Drains/Airways     Line                 Pacer Wires 04/05/19 1318 3 days          Peripheral Intravenous Line                 Peripheral IV - Single Lumen 04/05/19 0550 Right Forearm 4 days         Peripheral IV - Single Lumen 04/05/19 0745 Left Upper Arm 4 days         Peripheral IV - Single Lumen 04/08/19 2100 Posterior;Right Hand less than 1 day                Physical Exam   Constitutional: He is oriented to person, place, and time. He appears well-developed and well-nourished. No distress.   HENT:   Head: Normocephalic and atraumatic.   Eyes: Pupils are equal, round, and reactive to light. EOM are normal.   Neck: Normal range of motion. No JVD present.   Cardiovascular: Normal rate, regular rhythm, normal heart sounds and normal pulses.   Pulmonary/Chest: Effort normal and breath sounds normal. No respiratory distress.   Abdominal: Soft. He exhibits no distension.   Musculoskeletal: Normal range of motion. He exhibits no edema.   Neurological: He is alert and oriented to person, place, and time.   Skin: Skin is warm, dry and intact. Capillary refill takes less than 2 seconds. He is not diaphoretic. No erythema. No pallor.   Psychiatric: He has a normal mood and affect. His speech is normal and behavior is normal. Judgment and thought content normal.   Vitals reviewed.      Significant Labs:  BMP:   Recent Labs   Lab 04/09/19  0559   GLU 98      K 3.9   CL 99   CO2 30*   BUN 34*   CREATININE 1.1   CALCIUM 9.2   MG 1.7     CBC:   Recent Labs   Lab 04/09/19  0559   WBC 9.10    RBC 2.79*   HGB 9.1*   HCT 27.0*   *   MCV 97   MCH 32.6*   MCHC 33.7       Significant Diagnostics:  I have reviewed all pertinent imaging results/findings within the past 24 hours.    Assessment/Plan:     * S/P aortic valve repair  60 yo male s/p Bentall procedure on 4/5     Plan:  Neuro:   -Awake and alert.   -Pain control: prn oxycodone, prn dilaudid      Pulmonary:   -Extubated on 4/6  -On 1L NC wean as tolerated  -CXR daily   -IS, duonebs    Cardiac:  -MAP: 60-80 goal  -Cardene off. PRN bp medications started.   -Not requiring pressor support   -CT removed   -ASA, statin    -Coreg 6.25 BID - hold one dose today MAP 69 and BP 92/55  -prn hydralazine     FEN/GI:  -Cardiac diet advance   -Bowel regimen: miralax, docusate  -Protonix 40mg daily      /Renal:   - Vazquez: removed  4/8  - BUN/Cr: stable, will trend  - UOP adequate - continue to monitor   - lasix 40 TID     Infectious Disease:   -Afebrile  -WBC: 9. 10 from 9.7, will trend   -Antibiotics: ancef ziggy-op complete  -Cultures: none      Hematology/Oncology:  -H/H: stable, will trend   -required no blood products overnight   -ASA, statin     Endocrine:  -BG: insulin gtt off, SSI     Dispo:CTS primary. D/C 1-2 days           Urmila Longo PA-C  Cardiothoracic Surgery  [facility]

## 2019-04-09 NOTE — NURSING TRANSFER
Nursing Transfer Note      4/9/2019     Transfer To: CTSU Room: 3092, From: SICU Room: [id number]    Transfer via bed    Transfer with cardiac monitoring    Transported by: KHRIS Vazquez RN and PCT    Medicines sent: Mupirocin ointment    Chart send with patient: Yes    Notified: spouse    Patient reassessed at: 0300, 4/9/19    Upon arrival to floor: cardiac monitor applied, patient oriented to room, call bell in reach and bed in lowest position

## 2019-04-09 NOTE — PT/OT/SLP PROGRESS
"Physical Therapy Treatment    Patient Name:  Devin Lopez   MRN:  [id number]    Recommendations:     Discharge Recommendations:  home health PT   Discharge Equipment Recommendations: shower chair   Barriers to discharge: Decreased caregiver support at current functional level     Assessment:     Devin Lopez is a 59 y.o. male admitted with a medical diagnosis of S/P aortic valve repair.  He presents with the following impairments/functional limitations:  weakness, impaired endurance, impaired self care skills, impaired functional mobilty, gait instability, impaired cardiopulmonary response to activity. Pt tolerated activity with improved mobility reflected by increased distance ambulated. Pt continues to require assistance while ambulating 2/2 gait instability with narrow FCO with excessive lateral sway. Pt encouraged to ambulate 4x/day to improve balance and endurance. Pt would continue to benefit from acute skilled therapy intervention to address deficits and progress toward prior level of function.       Rehab Prognosis: Good; patient would benefit from acute skilled PT services to address these deficits and reach maximum level of function.    Recent Surgery: Procedure(s) (LRB):  BENTALL PROCEDURE (N/A) 4 Days Post-Op    Plan:     During this hospitalization, patient to be seen 4 x/week to address the identified rehab impairments via gait training, therapeutic exercises, therapeutic activities, neuromuscular re-education and progress toward the following goals:    · Plan of Care Expires:  05/07/19    Subjective     Chief Complaint: Pt states "It feels like my feet are round, it is hard to walk"   Patient/Family Comments/goals: to get better and return home   Pain/Comfort:  · Pain Rating 1: (pt reports "soreness" around incision, did not quantify )      Objective:     Communicated with RN prior to session.  Patient found up in chair with blood pressure cuff, pulse ox (continuous), telemetry upon PT entry to " room.     General Precautions: Standard, fall, sternal   Orthopedic Precautions:N/A   Braces: N/A     Functional Mobility:  · Transfers:     · Sit to Stand:  minimum assistance with no AD  · Gait: Pt ambulated 140 feet with no AD and CGA. Pt with very slow gait speed and monica, small step size, decreased foot clearance, narrow FCO with increased lateral sway. Pt with occasional lateral sway outside of FCO required min A to prevent LOB. Pt required occasional standing rest breaks. Pt demo'd increased WOB, denied SOB or dizziness.       AM-PAC 6 CLICK MOBILITY  Turning over in bed (including adjusting bedclothes, sheets and blankets)?: 4  Sitting down on and standing up from a chair with arms (e.g., wheelchair, bedside commode, etc.): 3  Moving from lying on back to sitting on the side of the bed?: 3  Moving to and from a bed to a chair (including a wheelchair)?: 3  Need to walk in hospital room?: 3  Climbing 3-5 steps with a railing?: 2  Basic Mobility Total Score: 18       Therapeutic Activities and Exercises:   Pt educated on role of PT/POC. Pt verbalized understanding.   Pt encouraged to only perform OOB mobility with assistance from nursing/therapy. Pt encouraged to ambulate 4x/day with assistance from nursing/therapy. Pt agreeable.       Patient left up in chair with all lines intact, call button in reach and RN  notified..    GOALS:   Multidisciplinary Problems     Physical Therapy Goals        Problem: Physical Therapy Goal    Goal Priority Disciplines Outcome Goal Variances Interventions   Physical Therapy Goal     PT, PT/OT Ongoing (interventions implemented as appropriate)     Description:  Goals to be met by: 4/20/2019    Patient will increase functional independence with mobility by performing:    Supine <> sit with Supervision.  Sit <> stand transfer with Supervision using No Assistive Device.  Bed <> chair transfer via Stand Pivot with Supervision using No Assistive Device.  Gait  x 150 feet with  Supervision using No Assistive Device to prepare for community ambulation and endurance activities.  Able to tolerate exercise for 15-20 reps with independence.                        Time Tracking:     PT Received On: 04/09/19  PT Start Time: 1031     PT Stop Time: 1049  PT Total Time (min): 18 min     Billable Minutes: Gait Training 18 mins     Treatment Type: Treatment  PT/PTA: PT           Collette Andrade, PT  04/09/2019

## 2019-04-09 NOTE — NURSING
Pt's BP 92/55 with MAP of 69, ordered by Allyson Stuart NP to hold morning carvedilol. Will continue to monitor.

## 2019-04-09 NOTE — PLAN OF CARE
Problem: Adult Inpatient Plan of Care  Goal: Plan of Care Review  Outcome: Ongoing (interventions implemented as appropriate)  Pt up in chair all day and ambulated hallway several times, worked well with PT/OT. Temperature under control today. No complaints of pain or SOB, no falls. Lasix continues IVP TID, pt compliant with fluid restriction. Pleasant pt with no verbalized complaints, VSS. Mid-sternal covered with steri-strips, pacer wires attached and secured.  Will continue to monitor.

## 2019-04-09 NOTE — MEDICAL/APP STUDENT
"History of Presenting Illness:  Mr. Lopez, 60 y/o M, is POD #4 s/p Bentall procedure for aortic root replacement on 4/6/19.     Interval History:  Presently, he is comfortable and denies any pain. The patient has continued to sit in the chair during the day and has been able to walk small distances in his room. Denies bowel movement but has been urinating well, jackson catheter was removed 4/8/19. Patient is afebrile but notes "feeling warm" last night, but this subsided with 2 Tylenol. Otherwise has no new complaints.      Past Medical History:  Past Medical History:   Diagnosis Date    Hypertension     Murmur        Past Surgical History:  Past Surgical History:   Procedure Laterality Date    BENTALL PROCEDURE N/A 4/5/2019    Performed by Yuri Burks MD at [facility] OR [facility] FLR    CATHETERIZATION, HEART, BOTH LEFT AND RIGHT Bilateral 2/8/2019    Performed by Jarred Alarcon MD at [facility] CATH LAB    CERVICAL SPINE SURGERY      HEART CATH-RIGHT & LEFT Right 2/27/2018    Performed by Jarred Alarcon MD at [facility] CATH LAB    SPINE SURGERY         Social History:  Social History     Socioeconomic History    Marital status:      Spouse name: Not on file    Number of children: Not on file    Years of education: Not on file    Highest education level: Not on file   Occupational History    Not on file   Social Needs    Financial resource strain: Not on file    Food insecurity:     Worry: Not on file     Inability: Not on file    Transportation needs:     Medical: Not on file     Non-medical: Not on file   Tobacco Use    Smoking status: Former Smoker     Packs/day: 0.50     Years: 40.00     Pack years: 20.00     Types: Cigarettes     Start date: 1/15/2018    Smokeless tobacco: Never Used   Substance and Sexual Activity    Alcohol use: Yes     Alcohol/week: 8.4 oz     Types: 14 Shots of liquor per week    Drug use: No    Sexual activity: Yes     Partners: Female     Comment: wife   Lifestyle "    Physical activity:     Days per week: Not on file     Minutes per session: Not on file    Stress: Not on file   Relationships    Social connections:     Talks on phone: Not on file     Gets together: Not on file     Attends Worship service: Not on file     Active member of club or organization: Not on file     Attends meetings of clubs or organizations: Not on file     Relationship status: Not on file   Other Topics Concern    Not on file   Social History Narrative    Not on file       Family History:  The patient has a family history of ***    Allergies:  Review of patient's allergies indicates:  No Known Allergies    Medications:    Current Facility-Administered Medications:     acetaminophen tablet 650 mg, 650 mg, Per OG tube, Q4H PRN, Freddy Magallon MD, 650 mg at 04/08/19 2314    aspirin suppository 300 mg, 300 mg, Rectal, Once PRN, Freddy Magallon MD    aspirin tablet 325 mg, 325 mg, Oral, Daily, Freddy Magallon MD, 325 mg at 04/08/19 0944    atorvastatin tablet 40 mg, 40 mg, Oral, Daily, ARYAN Gunter MD, 40 mg at 04/08/19 0944    bisacodyl suppository 10 mg, 10 mg, Rectal, Q12H PRN, Freddy Magallon MD    carvedilol tablet 6.25 mg, 6.25 mg, Oral, BID, Blanca Gómez MD, Stopped at 04/08/19 2100    docusate sodium capsule 100 mg, 100 mg, Oral, BID, Freddy Magallon MD, 100 mg at 04/08/19 2049    furosemide injection 40 mg, 40 mg, Intravenous, TID, Blanca Gómez MD, 40 mg at 04/08/19 2054    hydrALAZINE injection 10 mg, 10 mg, Intravenous, Q4H PRN, Blanca Gómez MD, 10 mg at 04/07/19 1140    magnesium sulfate 2g in water 50mL IVPB (premix), 2 g, Intravenous, PRN, Freddy Magallon MD, 2 g at 04/05/19 2336    magnesium sulfate 2g in water 50mL IVPB (premix), 4 g, Intravenous, PRN, Freddy Magallon MD    metoclopramide HCl injection 5 mg, 5 mg, Intravenous, Q6H PRN, Freddy Magallon MD    mupirocin 2 % ointment 1 g, 1 g, Nasal, BID, Freddy Magallon MD, 1 g at 04/08/19 2049    ondansetron injection 4 mg, 4  mg, Intravenous, Q12H PRN, Freddy Magallon MD    oxyCODONE immediate release tablet 10 mg, 10 mg, Oral, Q4H PRN, Freddy Magallon MD, 10 mg at 04/07/19 2057    oxyCODONE immediate release tablet 5 mg, 5 mg, Oral, Q4H PRN, Freddy Magallon MD, 5 mg at 04/09/19 0352    pantoprazole EC tablet 40 mg, 40 mg, Oral, Daily, Yuri Burks MD, 40 mg at 04/08/19 0945    polyethylene glycol packet 17 g, 17 g, Oral, Daily, Freddy Magallon MD, 17 g at 04/08/19 0945    potassium chloride 20 mEq in 100 mL IVPB (FOR CENTRAL LINE ADMINISTRATION ONLY), 20 mEq, Intravenous, PRN, Last Rate: 50 mL/hr at 04/06/19 0152, 20 mEq at 04/06/19 0152 **AND** potassium chloride 40 mEq in 100 mL IVPB (FOR CENTRAL LINE ADMINISTRATION ONLY), 40 mEq, Intravenous, PRN **AND** potassium chloride 20 mEq in 100 mL IVPB (FOR CENTRAL LINE ADMINISTRATION ONLY), 60 mEq, Intravenous, PRN, Freddy Magallon MD    sodium chloride 0.9% flush 10 mL, 10 mL, Intravenous, PRN, Freddy Magallon MD    Physical Exam   Constitutional: He is oriented to person, place, and time. He appears well-developed and well-nourished.   HENT:   Head: Normocephalic.   Eyes: Pupils are equal, round, and reactive to light.   Cardiovascular: Normal rate, regular rhythm, normal heart sounds and intact distal pulses.   Pulmonary/Chest: Effort normal and breath sounds normal. No respiratory distress.   Abdominal: Soft. Bowel sounds are normal. He exhibits no distension and no mass. There is no tenderness. There is no guarding.   Neurological: He is alert and oriented to person, place, and time.   Skin: Skin is warm.   : jackson catheter removed 4/8/19.  Psychiatric: He has a normal mood and affect. His behavior is normal.   Patient's midline incision appears to be healing well, no erythema/drainage. Chest tubes are out.     CAM ICU:  Level of Consciousness: RASS=0  Patient different than baseline, pre-hospital mental status? No  Patient with fluctuating mental status in past 24 hours by  fluctuation of level of consciousness/sedation? No   Result: Negative CAM-ICU    Vitals:  Vitals:    04/09/19 0346   BP: 109/75   Pulse:    Resp: 18   Temp: 99.1 °F (37.3 °C)       Labs:  WBC: 9.7  Hgb: 8.9  Hct: 27.8  Platelets: 97     Na+: 139  K+: 3.8  Cl-: 103  HCO3-: 28  BUN: 29  Creatinine: 1.1  Glucose: 95  Calcium: 8.8  Phosphorus: 2.7  Magnesium: 2.0     PT: 9.8  INR: 0.9      Ins: + 1,270  Outs: - 1,510  Total: -240 mL     Imaging:  CXR 4/8/19: No interval changes.    Assessment/Plan:  Cardio:   -MAP goal between 60-80  -Vitals stable  -Continue   -Continue atorvastatin 40 mg  -Continue Carvedilol 6.25 BID  -Continue walking and sitting in chair as tolerated     Pulmonary:   -chest tube removed 4/8/19  -patient no longer requiring NC, breathing comfortably on room air     Infectious Disease:   -Afebrile, WBC 9.7; continue to trend     Hematology/Oncology:  -H/H:  8.9/27.8, continue to trend     FEN/GI:  -Patient is tolerating cardiac diet. Continue miralax/docusate, as patient has not yet had BM.      /Renal:   -Vazquez removed 4/8/19  -BUN 29, Creatinine 1.1, continue to trend.      -UOP:  -240mL (total -1.38L since admission) on 40mg furosemide TID.        04/09/2019 This chart was completed by the Medical Student, Luisana Contreras

## 2019-04-09 NOTE — NURSING TRANSFER
Nursing Transfer Note      4/9/2019     Transfer To: [id number]    Transfer via bed    Transfer with cardiac monitoring, patient's belongings    Transported by: RN, PCT    Medicines sent: Yes    Chart send with patient: Yes    Notified: spouse    Patient reassessed at:  (4/9/19, 9902)    Upon arrival to floor: cardiac monitor applied, patient oriented to room, call bell in reach and bed in lowest position

## 2019-04-09 NOTE — SUBJECTIVE & OBJECTIVE
Interval History: NAEON. Pt states he has not walked much, only to the bathroom and back to the chair. Will work on increasing ambulation.     Review of Systems   Constitution: Negative for malaise/fatigue.   Cardiovascular: Positive for dyspnea on exertion. Negative for chest pain, claudication, leg swelling and palpitations.   Respiratory: Negative for cough and shortness of breath.    Hematologic/Lymphatic: Negative for bleeding problem.   Gastrointestinal: Negative for abdominal pain.   Genitourinary: Negative for dysuria.   Neurological: Negative for headaches and weakness.     Medications:  Continuous Infusions:  Scheduled Meds:   aspirin  325 mg Oral Daily    atorvastatin  40 mg Oral Daily    carvedilol  6.25 mg Oral BID    docusate sodium  100 mg Oral BID    furosemide  40 mg Intravenous TID    mupirocin  1 g Nasal BID    pantoprazole  40 mg Oral Daily    polyethylene glycol  17 g Oral Daily     PRN Meds:acetaminophen, aspirin, bisacodyl, hydrALAZINE, metoclopramide HCl, ondansetron, oxyCODONE, oxyCODONE, sodium chloride 0.9%     Objective:     Vital Signs (Most Recent):  Temp: 98.2 °F (36.8 °C) (04/09/19 0917)  Pulse: 102 (04/09/19 1100)  Resp: 20 (04/09/19 0917)  BP: (!) 92/55 (04/09/19 0917)  SpO2: (!) 94 % (04/09/19 0917) Vital Signs (24h Range):  Temp:  [98.2 °F (36.8 °C)-100.2 °F (37.9 °C)] 98.2 °F (36.8 °C)  Pulse:  [] 102  Resp:  [17-29] 20  SpO2:  [91 %-100 %] 94 %  BP: ()/(55-86) 92/55     Weight: 64.3 kg (141 lb 10.3 oz)  Body mass index is 19.76 kg/m².    SpO2: (!) 94 %  O2 Device (Oxygen Therapy): room air    Intake/Output - Last 3 Shifts       04/07 0700 - 04/08 0659 04/08 0700 - 04/09 0659 04/09 0700 - 04/10 0659    P.O.  1270     I.V. (mL/kg) 182 (2.4)      NG/GT       Total Intake(mL/kg) 182 (2.4) 1270 (16.5)     Urine (mL/kg/hr) 2715 (1.5) 1550 (0.8)     Chest Tube 157 20     Total Output 2872 1570     Net -2690 -300            Urine Occurrence  1 x            Lines/Drains/Airways     Line                 Pacer Wires 04/05/19 1318 3 days          Peripheral Intravenous Line                 Peripheral IV - Single Lumen 04/05/19 0550 Right Forearm 4 days         Peripheral IV - Single Lumen 04/05/19 0745 Left Upper Arm 4 days         Peripheral IV - Single Lumen 04/08/19 2100 Posterior;Right Hand less than 1 day                Physical Exam   Constitutional: He is oriented to person, place, and time. He appears well-developed and well-nourished. No distress.   HENT:   Head: Normocephalic and atraumatic.   Eyes: Pupils are equal, round, and reactive to light. EOM are normal.   Neck: Normal range of motion. No JVD present.   Cardiovascular: Normal rate, regular rhythm, normal heart sounds and normal pulses.   Pulmonary/Chest: Effort normal and breath sounds normal. No respiratory distress.   Abdominal: Soft. He exhibits no distension.   Musculoskeletal: Normal range of motion. He exhibits no edema.   Neurological: He is alert and oriented to person, place, and time.   Skin: Skin is warm, dry and intact. Capillary refill takes less than 2 seconds. He is not diaphoretic. No erythema. No pallor.   Psychiatric: He has a normal mood and affect. His speech is normal and behavior is normal. Judgment and thought content normal.   Vitals reviewed.      Significant Labs:  BMP:   Recent Labs   Lab 04/09/19  0559   GLU 98      K 3.9   CL 99   CO2 30*   BUN 34*   CREATININE 1.1   CALCIUM 9.2   MG 1.7     CBC:   Recent Labs   Lab 04/09/19  0559   WBC 9.10   RBC 2.79*   HGB 9.1*   HCT 27.0*   *   MCV 97   MCH 32.6*   MCHC 33.7       Significant Diagnostics:  I have reviewed all pertinent imaging results/findings within the past 24 hours.

## 2019-04-09 NOTE — PLAN OF CARE
Problem: Physical Therapy Goal  Goal: Physical Therapy Goal  Goals to be met by: 4/20/2019    Patient will increase functional independence with mobility by performing:    Supine <> sit with Supervision.  Sit <> stand transfer with Supervision using No Assistive Device.  Bed <> chair transfer via Stand Pivot with Supervision using No Assistive Device.  Gait  x 150 feet with Supervision using No Assistive Device to prepare for community ambulation and endurance activities.  Able to tolerate exercise for 15-20 reps with independence.       Outcome: Ongoing (interventions implemented as appropriate)  Pt is progressing toward goals. All goals remain appropriate.

## 2019-04-10 VITALS
HEART RATE: 88 BPM | DIASTOLIC BLOOD PRESSURE: 58 MMHG | OXYGEN SATURATION: 92 % | WEIGHT: 136.88 LBS | TEMPERATURE: 99 F | HEIGHT: 71 IN | SYSTOLIC BLOOD PRESSURE: 93 MMHG | RESPIRATION RATE: 18 BRPM | BODY MASS INDEX: 19.16 KG/M2

## 2019-04-10 LAB
ANION GAP SERPL CALC-SCNC: 10 MMOL/L (ref 8–16)
BASOPHILS # BLD AUTO: 0.02 K/UL (ref 0–0.2)
BASOPHILS NFR BLD: 0.2 % (ref 0–1.9)
BUN SERPL-MCNC: 38 MG/DL (ref 6–20)
CALCIUM SERPL-MCNC: 9.1 MG/DL (ref 8.7–10.5)
CHLORIDE SERPL-SCNC: 98 MMOL/L (ref 95–110)
CO2 SERPL-SCNC: 28 MMOL/L (ref 23–29)
CREAT SERPL-MCNC: 1.4 MG/DL (ref 0.5–1.4)
DIFFERENTIAL METHOD: ABNORMAL
EOSINOPHIL # BLD AUTO: 0.1 K/UL (ref 0–0.5)
EOSINOPHIL NFR BLD: 0.6 % (ref 0–8)
ERYTHROCYTE [DISTWIDTH] IN BLOOD BY AUTOMATED COUNT: 13.3 % (ref 11.5–14.5)
EST. GFR  (AFRICAN AMERICAN): >60 ML/MIN/1.73 M^2
EST. GFR  (NON AFRICAN AMERICAN): 54.2 ML/MIN/1.73 M^2
GLUCOSE SERPL-MCNC: 97 MG/DL (ref 70–110)
HCT VFR BLD AUTO: 25.9 % (ref 40–54)
HGB BLD-MCNC: 8.5 G/DL (ref 14–18)
IMM GRANULOCYTES # BLD AUTO: 0.03 K/UL (ref 0–0.04)
IMM GRANULOCYTES NFR BLD AUTO: 0.3 % (ref 0–0.5)
LYMPHOCYTES # BLD AUTO: 1.4 K/UL (ref 1–4.8)
LYMPHOCYTES NFR BLD: 14.9 % (ref 18–48)
MAGNESIUM SERPL-MCNC: 2.2 MG/DL (ref 1.6–2.6)
MCH RBC QN AUTO: 32.1 PG (ref 27–31)
MCHC RBC AUTO-ENTMCNC: 32.8 G/DL (ref 32–36)
MCV RBC AUTO: 98 FL (ref 82–98)
MONOCYTES # BLD AUTO: 1.1 K/UL (ref 0.3–1)
MONOCYTES NFR BLD: 11.4 % (ref 4–15)
NEUTROPHILS # BLD AUTO: 6.9 K/UL (ref 1.8–7.7)
NEUTROPHILS NFR BLD: 72.6 % (ref 38–73)
NRBC BLD-RTO: 0 /100 WBC
PHOSPHATE SERPL-MCNC: 3.2 MG/DL (ref 2.7–4.5)
PLATELET # BLD AUTO: 152 K/UL (ref 150–350)
PMV BLD AUTO: 11.4 FL (ref 9.2–12.9)
POTASSIUM SERPL-SCNC: 3.6 MMOL/L (ref 3.5–5.1)
RBC # BLD AUTO: 2.65 M/UL (ref 4.6–6.2)
SODIUM SERPL-SCNC: 136 MMOL/L (ref 136–145)
WBC # BLD AUTO: 9.46 K/UL (ref 3.9–12.7)

## 2019-04-10 PROCEDURE — 83735 ASSAY OF MAGNESIUM: CPT

## 2019-04-10 PROCEDURE — 84100 ASSAY OF PHOSPHORUS: CPT

## 2019-04-10 PROCEDURE — 36415 COLL VENOUS BLD VENIPUNCTURE: CPT

## 2019-04-10 PROCEDURE — 25000003 PHARM REV CODE 250: Performed by: STUDENT IN AN ORGANIZED HEALTH CARE EDUCATION/TRAINING PROGRAM

## 2019-04-10 PROCEDURE — 85025 COMPLETE CBC W/AUTO DIFF WBC: CPT

## 2019-04-10 PROCEDURE — 63600175 PHARM REV CODE 636 W HCPCS: Performed by: PHYSICIAN ASSISTANT

## 2019-04-10 PROCEDURE — 25000003 PHARM REV CODE 250: Performed by: THORACIC SURGERY (CARDIOTHORACIC VASCULAR SURGERY)

## 2019-04-10 PROCEDURE — 80048 BASIC METABOLIC PNL TOTAL CA: CPT

## 2019-04-10 RX ORDER — PANTOPRAZOLE SODIUM 40 MG/1
40 TABLET, DELAYED RELEASE ORAL DAILY
Qty: 30 TABLET | Refills: 11 | Status: SHIPPED | OUTPATIENT
Start: 2019-04-11 | End: 2020-04-07 | Stop reason: SDUPTHER

## 2019-04-10 RX ORDER — CARVEDILOL 3.12 MG/1
3.12 TABLET ORAL 2 TIMES DAILY
Status: DISCONTINUED | OUTPATIENT
Start: 2019-04-10 | End: 2019-04-10 | Stop reason: HOSPADM

## 2019-04-10 RX ORDER — POTASSIUM CHLORIDE 20 MEQ/1
20 TABLET, EXTENDED RELEASE ORAL 2 TIMES DAILY
Qty: 60 TABLET | Refills: 11 | Status: SHIPPED | OUTPATIENT
Start: 2019-04-10 | End: 2019-04-10 | Stop reason: SDUPTHER

## 2019-04-10 RX ORDER — ATORVASTATIN CALCIUM 40 MG/1
40 TABLET, FILM COATED ORAL DAILY
Qty: 90 TABLET | Refills: 3 | Status: SHIPPED | OUTPATIENT
Start: 2019-04-11 | End: 2020-04-07 | Stop reason: SDUPTHER

## 2019-04-10 RX ORDER — CARVEDILOL 3.12 MG/1
3.12 TABLET ORAL 2 TIMES DAILY
Qty: 60 TABLET | Refills: 11 | Status: SHIPPED | OUTPATIENT
Start: 2019-04-10 | End: 2019-04-16 | Stop reason: ALTCHOICE

## 2019-04-10 RX ORDER — POTASSIUM CHLORIDE 20 MEQ/1
TABLET, EXTENDED RELEASE ORAL
Qty: 60 TABLET | Refills: 11 | Status: SHIPPED | OUTPATIENT
Start: 2019-04-10 | End: 2019-05-02 | Stop reason: ALTCHOICE

## 2019-04-10 RX ORDER — FUROSEMIDE 10 MG/ML
20 INJECTION INTRAMUSCULAR; INTRAVENOUS 3 TIMES DAILY
Status: DISCONTINUED | OUTPATIENT
Start: 2019-04-10 | End: 2019-04-10 | Stop reason: HOSPADM

## 2019-04-10 RX ORDER — FUROSEMIDE 20 MG/1
TABLET ORAL
Qty: 60 TABLET | Refills: 11 | Status: SHIPPED | OUTPATIENT
Start: 2019-04-10 | End: 2019-05-02 | Stop reason: ALTCHOICE

## 2019-04-10 RX ORDER — OXYCODONE HYDROCHLORIDE 5 MG/1
5 TABLET ORAL EVERY 4 HOURS PRN
Qty: 42 TABLET | Refills: 0 | Status: SHIPPED | OUTPATIENT
Start: 2019-04-10 | End: 2019-04-16 | Stop reason: ALTCHOICE

## 2019-04-10 RX ORDER — FUROSEMIDE 20 MG/1
20 TABLET ORAL 2 TIMES DAILY
Qty: 60 TABLET | Refills: 11 | Status: SHIPPED | OUTPATIENT
Start: 2019-04-10 | End: 2019-04-10 | Stop reason: SDUPTHER

## 2019-04-10 RX ORDER — ASPIRIN 325 MG
325 TABLET ORAL DAILY
Refills: 0 | COMMUNITY
Start: 2019-04-11 | End: 2020-08-06

## 2019-04-10 RX ORDER — DOCUSATE SODIUM 100 MG/1
100 CAPSULE, LIQUID FILLED ORAL 2 TIMES DAILY
Refills: 0 | COMMUNITY
Start: 2019-04-10 | End: 2019-05-02 | Stop reason: ALTCHOICE

## 2019-04-10 RX ADMIN — DOCUSATE SODIUM 100 MG: 100 CAPSULE, LIQUID FILLED ORAL at 09:04

## 2019-04-10 RX ADMIN — ASPIRIN 325 MG ORAL TABLET 325 MG: 325 PILL ORAL at 09:04

## 2019-04-10 RX ADMIN — PANTOPRAZOLE SODIUM 40 MG: 40 TABLET, DELAYED RELEASE ORAL at 09:04

## 2019-04-10 RX ADMIN — ATORVASTATIN CALCIUM 40 MG: 20 TABLET, FILM COATED ORAL at 09:04

## 2019-04-10 RX ADMIN — FUROSEMIDE 20 MG: 10 INJECTION, SOLUTION INTRAMUSCULAR; INTRAVENOUS at 09:04

## 2019-04-10 NOTE — PROGRESS NOTES
04/09/19 1948   Vital Signs   BP (!) 94/54   MAP (mmHg) 55   BP Location Right arm   BP Method Automatic   Patient Position Sitting     Notified Dr. Fran MD with CTS of patient's recent BP. Patient due to receive 6.25 coreg PO and 40 mg IVP lasix at 2100. Ordered to hold coreg and give 20 mg of lasix IVP. Will administer and continue to monitor.

## 2019-04-10 NOTE — PT/OT/SLP PROGRESS
Occupational Therapy      Patient Name:  Devin Lopez   MRN:  [id number]    Attempted to see patient for OT treatment session, however pt reporting he already worked with PT and d/c'ing today with no further skilled acute OT concerns.     Lori Woody, OT  4/10/2019

## 2019-04-10 NOTE — PLAN OF CARE
Problem: Adult Inpatient Plan of Care  Goal: Plan of Care Review  Outcome: Ongoing (interventions implemented as appropriate)  Patient remained free from falls/injury/trauma throughout shift. No complaints of chest pain or SOB. Coreg held due to low BP of 94/55. Only 20 mg lasix IVP given as well. Patient did have BM last night. PRN pain medication provided. Sternal and fall risk precautions reviewed and maintained. Plan of care reviewed with patient. Patient verbalized understanding. All questions encouraged and answered. Will continue to monitor.

## 2019-04-10 NOTE — PLAN OF CARE
04/10/19 1343   Final Note   Assessment Type Final Discharge Note   Anticipated Discharge Disposition Home-Health   What phone number can be called within the next 1-3 days to see how you are doing after discharge? [phone]   Hospital Follow Up  Appt(s) scheduled? Yes   Discharge plans and expectations educations in teach back method with documentation complete? Yes   Right Care Referral Info   Post Acute Recommendation Home-care     Pt is ready for discharge to home in care of his spouse with Home Health with [facility]. No other discharge needs. Follow up appt to be scheduled by clinic staff.    Future Appointments   Date Time Provider Department Center   4/30/2019  2:00 PM Jarred Alarcon MD [facility]

## 2019-04-10 NOTE — NURSING
Pt's morning BP 83/47, AVA Noriega notified and advised not giving pt's morning coreg. Will continue to monitor.

## 2019-04-10 NOTE — HPI
Mr. Lopez is a 59-year-old gentleman who has been followed for aortic insufficiency.  He had initially had a decreased left ventricular ejection fraction, but improved with medical management.  Unfortunately, over time, he demonstrated progressive left ventricular dilation.  He now presents for aortic root replacement. As part of his preoperative evaluation, he underwent CT scanning, which demonstrated an aortic root that was 4.3 cm in maximal diameter.  Given his young age and the lack of other comorbid conditions, root replacement was recommended.

## 2019-04-10 NOTE — PLAN OF CARE
04/10/19 1326   Post-Acute Status   Post-Acute Authorization Home Health/Hospice   Home Health/Hospice Status Set-up Complete     Pt was accepted to [facility] .  They will see him tomorrow.    Mylene Santana, DARRYL x [phone]   "You can access the [facility], offered by [facility], by registering with the following website: [email or web address]"

## 2019-04-10 NOTE — NURSING
Pt d/c home per MD order. Telemetry removed. Telemetry room notified. IV access removed and intact x2. VSS. No distress noted. No complaints noted at this time. Pt given and explained medication list and prescriptions. Pt verbalizes complete understanding of all discharge instructions and follow up care. Pt given printed AVS. AVS confirmation completed. Family at bedside.  Awaiting escort. Will continue to monitor.

## 2019-04-10 NOTE — HOSPITAL COURSE
On 4/5/19, the patient was taken to the Operating Room for the above stated procedure. Please see the previously dictated operative report for complete details. Postoperatively, the patient was taken from the  Operating Room to the ICU where the vital signs were monitored and pain was kept under control. The patient was weaned from the drips and extubated in the ICU per protocol. Once hemodynamically stable, the patient was transferred to the Cardiac Step-Down floor for continued strengthening and ambulation. On postoperative day 5, the patient was ready for discharge to home. At the time of discharge, the patient was ambulating unassisted. Pain was well controlled with oral analgesics and the patient was tolerating the diet.     MOBILITY AND ACTIVITY: As tolerated. Patient may shower. No heavy lifting of greater than 5 pounds and no driving.     DIET: An 1800-calorie ADA with a 1500 mL fluid restriction.     WOUND CARE INSTRUCTIONS: Check for redness, swelling and drainage around the  incision or wound. Patient is to call for any obvious bleeding, drainage, pus from the wound, unusual problems or difficulties or temperature of greater than 101   degrees.     FOLLOWUP: Follow up with Dr. Burks in approximately 3 weeks. Prior to this  appointment, the patient will have a chest x-ray and EKG.     Patient not placed on Ace-Inhibitor at the time of discharge due to potential for hypotension       DISCHARGE CONDITION: At the time of discharge, the patient was in sinus rhythm and afebrile with stable vital signs.

## 2019-04-10 NOTE — PHYSICIAN QUERY
"PT Name: Devin Lopez  MR #: [id number]    Physician Query Form - Hematology Clarification      CDS/: Arianna Basilio RN              Contact information: [phone]    This form is a permanent document in the medical record.      Query Date: April 10, 2019    By submitting this query, we are merely seeking further clarification of documentation. Please utilize your independent clinical judgment when addressing the question(s) below.    The Medical record contains the following:   Indicators  Supporting Clinical Findings Location in Medical Record    "Anemia" documented     x H & H = Hgb  = 6.2-> 8.9-> 8.8  Hct  = 18.4->26.3->26.1   Lab 4/5, 4/6, 4/7   x BP =                     HR=     Resp: 14 (04/05/19 2000)      BP: 112/68 (04/05/19 2000)      H&P 4/5     Critical Care Surgery     "GI bleeding" documented     x Acute bleeding (Non GI site)  ESTIMATED BLOOD LOSS:  100 mL.    Op Note 4/5   x Transfusion(s)      Hem/Onc:    - Chest tube put out approx 1500 cc upon arrival to SICU    - Transfused 3u PRBC, 4 FFP, and 2 PLT    - Will continue to monitor        H&P 4/5     Critical Care Surgery     Treatment:      Other:        Doctor, please specify diagnosis or diagnoses associated with above clinical findings.    [X  ] Acute blood loss anemia expected post-operatively       [  ] Other Hematological Diagnosis (please specify):       [  ] Clinically Undetermined       Please document in your progress notes daily for the duration of treatment, until resolved, and include in your discharge summary.                                                                                                      "

## 2019-04-10 NOTE — DISCHARGE SUMMARY
[facility]  Cardiothoracic Surgery  Discharge Summary      Patient Name: Devin Lopez  MRN: [id number]  Admission Date: 4/5/2019  Hospital Length of Stay: 5 days  Discharge Date and Time:  04/10/2019 10:27 AM  Attending Physician: Yuri Burks MD   Discharging Provider: Urmila Longo PA-C  Primary Care Provider: Gage Jaimes Jr, MD (Inactive)    HPI:   Mr. Lopez is a 59-year-old gentleman who has been followed for aortic insufficiency.  He had initially had a decreased left ventricular ejection fraction, but improved with medical management.  Unfortunately, over time, he demonstrated progressive left ventricular dilation.  He now presents for aortic root replacement. As part of his preoperative evaluation, he underwent CT scanning, which demonstrated an aortic root that was 4.3 cm in maximal diameter.  Given his young age and the lack of other comorbid conditions, root replacement was recommended.    Procedure(s) (LRB):  BENTALL PROCEDURE (N/A)      Hospital Course: On 4/5/19, the patient was taken to the Operating Room for the above stated procedure. Please see the previously dictated operative report for complete details. Postoperatively, the patient was taken from the  Operating Room to the ICU where the vital signs were monitored and pain was kept under control. The patient was weaned from the drips and extubated in the ICU per protocol. Once hemodynamically stable, the patient was transferred to the Cardiac Step-Down floor for continued strengthening and ambulation. On postoperative day 5, the patient was ready for discharge to home. At the time of discharge, the patient was ambulating unassisted. Pain was well controlled with oral analgesics and the patient was tolerating the diet.     MOBILITY AND ACTIVITY: As tolerated. Patient may shower. No heavy lifting of greater than 5 pounds and no driving.     DIET: An 1800-calorie ADA with a 1500 mL fluid restriction.     WOUND  CARE INSTRUCTIONS: Check for redness, swelling and drainage around the  incision or wound. Patient is to call for any obvious bleeding, drainage, pus from the wound, unusual problems or difficulties or temperature of greater than 101   degrees.     FOLLOWUP: Follow up with Dr. Burks in approximately 3 weeks. Prior to this  appointment, the patient will have a chest x-ray and EKG.     Patient not placed on Ace-Inhibitor at the time of discharge due to potential for hypotension       DISCHARGE CONDITION: At the time of discharge, the patient was in sinus rhythm and afebrile with stable vital signs.      Consults (From admission, onward)        Status Ordering Provider     Consult Case Management/Social Work  Once     Provider:  (Not yet assigned)    Acknowledged SUSAN POOLE     Consult to Endocrinology  Once     Provider:  (Not yet assigned)    Completed SUSAN POOLE            Pending Diagnostic Studies:     Procedure Component Value Units Date/Time    APTT [[id number]] Collected:  04/07/19 0317    Order Status:  Sent Lab Status:  In process Updated:  04/07/19 0318    Specimen:  Blood     CBC auto differential [[id number]] Collected:  04/07/19 0317    Order Status:  Sent Lab Status:  In process Updated:  04/07/19 0318    Specimen:  Blood     Fibrinogen [[id number]] Collected:  04/07/19 0317    Order Status:  Sent Lab Status:  In process Updated:  04/07/19 0318    Specimen:  Blood     Fibrinogen [[id number]] Collected:  04/06/19 1500    Order Status:  Sent Lab Status:  No result     Specimen:  Blood     Magnesium [[id number]] Collected:  04/06/19 1500    Order Status:  Sent Lab Status:  No result     Specimen:  Blood     Phosphorus [[id number]] Collected:  04/06/19 1500    Order Status:  Sent Lab Status:  No result     Specimen:  Blood     Protime-INR [[id number]] Collected:  04/07/19 0317    Order Status:  Sent Lab Status:  In process Updated:  04/07/19 0318    Specimen:  Blood             Final Active Diagnoses:     Diagnosis Date Noted POA    PRINCIPAL PROBLEM:  S/P aortic valve repair [Z98.890] 04/05/2019 Not Applicable    Stress hyperglycemia [R73.9] 04/05/2019 No    Nonrheumatic aortic valve insufficiency [I35.1] 02/26/2018 Yes      Problems Resolved During this Admission:      Discharged Condition: stable    Disposition: Home or Self Care    Follow Up:  Follow-up Information     Yuri Burks MD In 3 weeks.    Specialties:  Cardiothoracic Surgery, Transplant  Contact information:  [address]  [facility] [address]  [phone]                 Patient Instructions:      Ambulatory referral to Home Health   Referral Priority: Routine Referral Type: Home Health   Referral Reason: Specialty Services Required   Requested Specialty: Home Health Services   Number of Visits Requested: 1     Medications:  Reconciled Home Medications:      Medication List      START taking these medications    aspirin 325 MG tablet  Take 1 tablet (325 mg total) by mouth once daily.  Start taking on:  4/11/2019     atorvastatin 40 MG tablet  Commonly known as:  LIPITOR  Take 1 tablet (40 mg total) by mouth once daily.  Start taking on:  4/11/2019     docusate sodium 100 MG capsule  Commonly known as:  COLACE  Take 1 capsule (100 mg total) by mouth 2 (two) times daily.     oxyCODONE 5 MG immediate release tablet  Commonly known as:  ROXICODONE  Take 1 tablet (5 mg total) by mouth every 4 (four) hours as needed for Pain.     pantoprazole 40 MG tablet  Commonly known as:  PROTONIX  Take 1 tablet (40 mg total) by mouth once daily.  Start taking on:  4/11/2019     potassium chloride SA 20 MEQ tablet  Commonly known as:  K-DUR,KLOR-CON  Take one tablet by mouth twice daily for 7 days then decrease to once daily        CHANGE how you take these medications    carvedilol 3.125 MG tablet  Commonly known as:  COREG  Take 1 tablet (3.125 mg total) by mouth 2 (two) times daily.  What changed:    · medication strength  · See the new  instructions.     furosemide 20 MG tablet  Commonly known as:  LASIX  Take one tablet by mouth twice daily for 7 days then decrease to once daily  What changed:    · medication strength  · See the new instructions.        STOP taking these medications    losartan 50 MG tablet  Commonly known as:  COZAAR          Time spent on the discharge of patient: 35 minutes    Urmila Longo PA-C  Cardiothoracic Surgery  [facility]

## 2019-04-10 NOTE — PLAN OF CARE
04/10/19 1125   Post-Acute Status   Post-Acute Authorization Home Health/Hospice   Home Health/Hospice Status Referrals Sent     SW sent a referral to [facility]. There are only a few [facility] that take the pt's insurance.  They are reviewing it now.    Mylene Santana, [facility] x [phone]

## 2019-04-11 DIAGNOSIS — Z86.79 H/O AORTIC ANEURYSM: Primary | ICD-10-CM

## 2019-04-12 ENCOUNTER — DOCUMENTATION ONLY (OUTPATIENT)
Dept: CARDIOTHORACIC SURGERY | Facility: CLINIC | Age: 59
End: 2019-04-12

## 2019-04-12 NOTE — PROGRESS NOTES
Called and left message for pt s/p Bentall and hospital discharge.  Reiterated the need for pt to clean incision everyday with soap and water, and to walk as much as possible.  Reminded pt not to drive for the first 4 weeks after surgery, and to refrain from lifting, pushing, and pulling anything greater than 5 pounds for the first 6 weeks following surgery.  Instructed pt to perform daily weights.  Pt instructed to notify the clinic of any weight gain greater than 3 pounds in one day.  Patient given [facility] post-op date/time. Pt instructed to call the clinic with any questions or concerns.  Pt verbalized understanding.

## 2019-04-16 ENCOUNTER — TELEPHONE (OUTPATIENT)
Dept: CARDIOTHORACIC SURGERY | Facility: CLINIC | Age: 59
End: 2019-04-16

## 2019-04-16 NOTE — TELEPHONE ENCOUNTER
Spoke to patient after speaking with  nurse Mandeep, who reports BP 88/48, HR 46, SPO2 95%.  Per Allyson Stuart NP, told patient to discontinue Coreg, to increase water, to take Miralax in addition to Colace for constipation, and to report to the ED if HR stays the same or goes down further. Pt verbalized understanding. Will call to check on patient in 2 days.----- Message from Willem Villalta sent at 4/16/2019  1:17 PM CDT -----  Contact: Mandeep Trinh Rn  Needs Advice    Reason for call:The caller states that the parameters for the Pt has dropped below where they should be and the caller is updating Dr. Burks with the new levels.  BP88/48, Oxigen 95 Pulse 46 and the Pt complains of occasional dizziness upon standing.        Communication Preference:[phone]    Additional Information:Please contact the caller.

## 2019-05-02 ENCOUNTER — HOSPITAL ENCOUNTER (OUTPATIENT)
Dept: CARDIOLOGY | Facility: CLINIC | Age: 59
Discharge: HOME | End: 2019-05-02
Attending: INTERNAL MEDICINE
Payer: COMMERCIAL

## 2019-05-02 ENCOUNTER — OFFICE VISIT (OUTPATIENT)
Dept: CARDIOTHORACIC SURGERY | Facility: CLINIC | Age: 59
End: 2019-05-02
Payer: COMMERCIAL

## 2019-05-02 ENCOUNTER — HOSPITAL ENCOUNTER (OUTPATIENT)
Dept: RADIOLOGY | Facility: HOSPITAL | Age: 59
Discharge: HOME | End: 2019-05-02
Attending: THORACIC SURGERY (CARDIOTHORACIC VASCULAR SURGERY)
Payer: COMMERCIAL

## 2019-05-02 VITALS
DIASTOLIC BLOOD PRESSURE: 82 MMHG | HEIGHT: 71 IN | TEMPERATURE: 98 F | WEIGHT: 142 LBS | SYSTOLIC BLOOD PRESSURE: 122 MMHG | OXYGEN SATURATION: 100 % | HEART RATE: 90 BPM | BODY MASS INDEX: 19.88 KG/M2

## 2019-05-02 DIAGNOSIS — Z86.79 H/O AORTIC ANEURYSM: ICD-10-CM

## 2019-05-02 DIAGNOSIS — Z98.890 H/O AORTIC ROOT REPAIR: Primary | ICD-10-CM

## 2019-05-02 PROCEDURE — 93010 EKG 12-LEAD: ICD-10-PCS | Mod: S$GLB,,, | Performed by: INTERNAL MEDICINE

## 2019-05-02 PROCEDURE — 93005 EKG 12-LEAD: ICD-10-PCS | Mod: S$GLB,,, | Performed by: THORACIC SURGERY (CARDIOTHORACIC VASCULAR SURGERY)

## 2019-05-02 PROCEDURE — 99999 PR PBB SHADOW E&M-EST. PATIENT-LVL III: ICD-10-PCS | Mod: PBBFAC,,, | Performed by: THORACIC SURGERY (CARDIOTHORACIC VASCULAR SURGERY)

## 2019-05-02 PROCEDURE — 99024 PR POST-OP FOLLOW-UP VISIT: ICD-10-PCS | Mod: S$GLB,,, | Performed by: THORACIC SURGERY (CARDIOTHORACIC VASCULAR SURGERY)

## 2019-05-02 PROCEDURE — 93005 ELECTROCARDIOGRAM TRACING: CPT | Mod: S$GLB,,, | Performed by: THORACIC SURGERY (CARDIOTHORACIC VASCULAR SURGERY)

## 2019-05-02 PROCEDURE — 99024 POSTOP FOLLOW-UP VISIT: CPT | Mod: S$GLB,,, | Performed by: THORACIC SURGERY (CARDIOTHORACIC VASCULAR SURGERY)

## 2019-05-02 PROCEDURE — 71046 X-RAY EXAM CHEST 2 VIEWS: CPT | Mod: 26,,, | Performed by: RADIOLOGY

## 2019-05-02 PROCEDURE — 93010 ELECTROCARDIOGRAM REPORT: CPT | Mod: S$GLB,,, | Performed by: INTERNAL MEDICINE

## 2019-05-02 PROCEDURE — 99999 PR PBB SHADOW E&M-EST. PATIENT-LVL III: CPT | Mod: PBBFAC,,, | Performed by: THORACIC SURGERY (CARDIOTHORACIC VASCULAR SURGERY)

## 2019-05-02 PROCEDURE — 71046 X-RAY EXAM CHEST 2 VIEWS: CPT | Mod: TC,FY

## 2019-05-02 PROCEDURE — 71046 XR CHEST PA AND LATERAL: ICD-10-PCS | Mod: 26,,, | Performed by: RADIOLOGY

## 2019-05-02 NOTE — LETTER
[facility] Cardiovascular S[facility]  [address]  [facility] [address]  Phone: [phone] May 2, 2019      Jarred Alarcon MD  [address]  [place] [address]    Patient: Devin Lopez   MR Number: [id number]   YOB: 1960   Date of Visit: 5/2/2019     Dear Dr. Alarcon,     I had the pleasure seeing your patient Mr. Devin Lopez in clinic today.  As you know, he is a very pleasant 59-year-old gentleman who had been followed for aortic insufficiency. At one point, his ejection fraction decreased, but improved with medical management.  Unfortunately, despite this improvement, his left ventricle continued to dilate. On April 5th, he underwent aortic root replacement rather than simple aortic valve replacement since a preoperative CT scan had demonstrated a dilated proximal aorta.  His postoperative course was unremarkable, and he was ultimately discharged to home.      Today he returned for routine follow-up, and in clinic today Mr. Lopez looked fabulous.  On physical examination, his sternum appeared to be healing nicely.  His chest x-ray was clear.  His EKG demonstrated a sinus rhythm.  Overall, I am very pleased with how well Candiceer Jessica has done.  As result, I did not schedule him to follow up with me.  Certainly, should you or he have any questions or concerns please do not hesitate to give me a call.      Thank you again for sending this pleasant gentleman to me.    Sincerely,      Yuri Burks MD  Chief, Division of Thoracic & Cardiovascular Surgery  [facility]    PEP/etb

## 2019-05-02 NOTE — PROGRESS NOTES
POST OP VISIT    SUBJECTIVE:  Devin Lopez is a 59 y.o. male now s/p Bentall Procedure 4/5/19. He was discharged on POD 5 and has been doing very well at home since surgery. Denies chest pain, SOB, CERVANTES, syncope, or changes in his incision such as drainage, redness, or skin breakdown.     PHYSICAL EXAM:  Vitals:    05/02/19 1428   BP: 122/82   Pulse: 90   Temp: 98.3 °F (36.8 °C)     A&O, NAD  No Respiratory Distress  Incision well healing without erythema or drainage    INTERVAL PATHOLOGY:  SPECIMEN  1) Aortic tissue.  2) Aortic valve leaflets.  FINAL PATHOLOGIC DIAGNOSIS  1. Aortic tissue (excision):  Focal atherosclerotic and degenerative changes  2. Aortic valve leaflets (excision):  Leaflets with degenerative changes    CXR:  FINDINGS:  Postoperative changes in the thorax and cervical spine identified.  Heart size normal.  The lungs are clear.  No pleural effusion.    ASSESSMENT & PLAN:  Devin Lopez is a 59 y.o. male s/p Bentall 4/5. Doing well post op without apparent complication. PreOp Echo showed and EF of 35-45%, he would likely benefit from cardiology follow up.    - RTC PRN  - will set him up to see his cardiologist Dr. Alarcon   - Cardiac rehab program   - d/c Lasix and PO K+ supplementation        Freddy Magallon MD   [phone]  General Surgery   [facility] Attending Note:    I have personally taken the history and examined this patient and agree with the resident's note as stated above. Looks terrific.

## 2019-05-03 ENCOUNTER — TELEPHONE (OUTPATIENT)
Dept: CARDIAC REHAB | Facility: CLINIC | Age: 59
End: 2019-05-03

## 2019-05-03 ENCOUNTER — DOCUMENTATION ONLY (OUTPATIENT)
Dept: CARDIOTHORACIC SURGERY | Facility: CLINIC | Age: 59
End: 2019-05-03

## 2019-05-03 ENCOUNTER — TELEPHONE (OUTPATIENT)
Dept: CARDIOTHORACIC SURGERY | Facility: CLINIC | Age: 59
End: 2019-05-03

## 2019-05-03 DIAGNOSIS — Z98.890 PERSONAL HISTORY OF SURGERY TO HEART AND GREAT VESSELS, PRESENTING HAZARDS TO HEALTH: Primary | ICD-10-CM

## 2019-05-03 NOTE — TELEPHONE ENCOUNTER
----- Message from Hattie Kong sent at 5/3/2019  3:21 PM CDT -----  Contact: Pt called   Pt need to schedule an appt with Cardiac Rehab. Please call pt @ [phone]. Thank you.

## 2019-05-03 NOTE — PROGRESS NOTES
Pt had postop appt on 5/2 with Dr. Burks. Reminded patient to keep incision clean and dry, and to adhere to the 5 lb weight restriction for 6 wks post surgery, then 20lb restriction for 6 additional wks. Patient verbalized understanding. Patient cleared to drive. Patient has been set up with Cardiologist appt within 30 days of surgery. Phase II Cardiac Rehab orders placed.

## 2019-05-06 ENCOUNTER — TELEPHONE (OUTPATIENT)
Dept: ADMINISTRATIVE | Facility: CLINIC | Age: 59
End: 2019-05-06

## 2019-05-13 ENCOUNTER — TELEPHONE (OUTPATIENT)
Dept: CARDIAC REHAB | Facility: CLINIC | Age: 59
End: 2019-05-13

## 2019-05-13 ENCOUNTER — TELEPHONE (OUTPATIENT)
Dept: CARDIOLOGY | Facility: HOSPITAL | Age: 59
End: 2019-05-13

## 2019-05-13 NOTE — TELEPHONE ENCOUNTER
----- Message from Bailee Ayala sent at 5/13/2019  2:28 PM CDT -----  Contact: jus Ordoñez Pt is returning your call and can be reached at [phone].    Thank you

## 2019-05-21 ENCOUNTER — OFFICE VISIT (OUTPATIENT)
Dept: CARDIOLOGY | Facility: CLINIC | Age: 59
End: 2019-05-21
Attending: INTERNAL MEDICINE
Payer: COMMERCIAL

## 2019-05-21 VITALS
HEIGHT: 71 IN | HEART RATE: 65 BPM | WEIGHT: 145 LBS | SYSTOLIC BLOOD PRESSURE: 111 MMHG | BODY MASS INDEX: 20.3 KG/M2 | DIASTOLIC BLOOD PRESSURE: 79 MMHG

## 2019-05-21 DIAGNOSIS — Z98.890 S/P AORTIC VALVE REPAIR: Primary | ICD-10-CM

## 2019-05-21 DIAGNOSIS — I35.1 NONRHEUMATIC AORTIC VALVE INSUFFICIENCY: Primary | ICD-10-CM

## 2019-05-21 DIAGNOSIS — Z95.2 HISTORY OF AORTIC VALVE REPLACEMENT: ICD-10-CM

## 2019-05-21 DIAGNOSIS — Z98.890 H/O AORTIC ROOT REPAIR: ICD-10-CM

## 2019-05-21 DIAGNOSIS — I50.42 CHRONIC COMBINED SYSTOLIC AND DIASTOLIC CONGESTIVE HEART FAILURE: ICD-10-CM

## 2019-05-21 DIAGNOSIS — I50.41 ACUTE COMBINED SYSTOLIC AND DIASTOLIC CONGESTIVE HEART FAILURE: ICD-10-CM

## 2019-05-21 PROCEDURE — 3008F PR BODY MASS INDEX (BMI) DOCUMENTED: ICD-10-PCS | Mod: CPTII,S$GLB,, | Performed by: INTERNAL MEDICINE

## 2019-05-21 PROCEDURE — 3008F BODY MASS INDEX DOCD: CPT | Mod: CPTII,S$GLB,, | Performed by: INTERNAL MEDICINE

## 2019-05-21 PROCEDURE — 3074F PR MOST RECENT SYSTOLIC BLOOD PRESSURE < 130 MM HG: ICD-10-PCS | Mod: CPTII,S$GLB,, | Performed by: INTERNAL MEDICINE

## 2019-05-21 PROCEDURE — 99214 OFFICE O/P EST MOD 30 MIN: CPT | Mod: S$GLB,,, | Performed by: INTERNAL MEDICINE

## 2019-05-21 PROCEDURE — 3078F PR MOST RECENT DIASTOLIC BLOOD PRESSURE < 80 MM HG: ICD-10-PCS | Mod: CPTII,S$GLB,, | Performed by: INTERNAL MEDICINE

## 2019-05-21 PROCEDURE — 99214 PR OFFICE/OUTPT VISIT, EST, LEVL IV, 30-39 MIN: ICD-10-PCS | Mod: S$GLB,,, | Performed by: INTERNAL MEDICINE

## 2019-05-21 PROCEDURE — 3074F SYST BP LT 130 MM HG: CPT | Mod: CPTII,S$GLB,, | Performed by: INTERNAL MEDICINE

## 2019-05-21 PROCEDURE — 3078F DIAST BP <80 MM HG: CPT | Mod: CPTII,S$GLB,, | Performed by: INTERNAL MEDICINE

## 2019-05-21 RX ORDER — METOPROLOL SUCCINATE 25 MG/1
25 TABLET, EXTENDED RELEASE ORAL DAILY
COMMUNITY
End: 2019-05-21 | Stop reason: SDUPTHER

## 2019-05-21 RX ORDER — METOPROLOL SUCCINATE 25 MG/1
25 TABLET, EXTENDED RELEASE ORAL DAILY
Qty: 30 TABLET | Refills: 6 | Status: SHIPPED | OUTPATIENT
Start: 2019-05-21 | End: 2019-12-16 | Stop reason: SDUPTHER

## 2019-05-21 NOTE — PROGRESS NOTES
Subjective:    Patient ID:  Devin Lopez is a 59 y.o. male     HPI   Here for follow-up after recent aortic valve replacement for aortic insufficiency, chronic combined systolic and diastolic congestive heart failure, history of aortic root repair.    I feel well.  I have some chest discomfort when I turn from side to side in my sleep.  I have no breathlessness on getting around.    Current Outpatient Medications   Medication Sig    aspirin 325 MG tablet Take 1 tablet (325 mg total) by mouth once daily.    atorvastatin (LIPITOR) 40 MG tablet Take 1 tablet (40 mg total) by mouth once daily.    metoprolol succinate (TOPROL-XL) 25 MG 24 hr tablet Take 1 tablet (25 mg total) by mouth once daily.    pantoprazole (PROTONIX) 40 MG tablet Take 1 tablet (40 mg total) by mouth once daily.     No current facility-administered medications for this visit.          Review of Systems   Constitution: Negative for chills, decreased appetite, fever, weight gain and weight loss.   HENT: Negative for congestion, hearing loss and sore throat.    Eyes: Negative for blurred vision, double vision and visual disturbance.   Cardiovascular: Negative for chest pain, claudication, dyspnea on exertion, leg swelling, palpitations and syncope.   Respiratory: Negative for cough, hemoptysis, shortness of breath, sputum production and wheezing.    Endocrine: Negative for cold intolerance and heat intolerance.   Hematologic/Lymphatic: Negative for bleeding problem. Does not bruise/bleed easily.   Skin: Negative for color change, dry skin, flushing and itching.   Musculoskeletal: Negative for back pain, joint pain and myalgias.   Gastrointestinal: Negative for abdominal pain, anorexia, constipation, diarrhea, dysphagia, nausea and vomiting.        No bleeding per rectum   Genitourinary: Negative for dysuria, flank pain, frequency, hematuria and nocturia.   Neurological: Negative for dizziness, headaches, light-headedness, loss of balance,  "seizures and tremors.   Psychiatric/Behavioral: Negative for altered mental status and depression.     Vitals:    05/21/19 1518   BP: 111/79   Pulse: 65   Weight: 65.8 kg (145 lb)   Height: 5' 11" (1.803 m)        Objective:    Physical Exam   Constitutional: He is oriented to person, place, and time. He appears well-developed and well-nourished.   HENT:   Head: Normocephalic and atraumatic.   Right Ear: External ear normal.   Left Ear: External ear normal.   Nose: Nose normal.   Eyes: Pupils are equal, round, and reactive to light. Conjunctivae and EOM are normal. No scleral icterus.   Neck: Normal range of motion. Neck supple. No JVD present. No tracheal deviation present. No thyromegaly present.   Cardiovascular: Normal rate, regular rhythm and normal heart sounds. Exam reveals no gallop and no friction rub.   No murmur heard.  Pulmonary/Chest: Effort normal and breath sounds normal. No respiratory distress. He has no rales. He exhibits no tenderness.   Abdominal: Soft. Bowel sounds are normal. He exhibits no distension and no mass. There is no tenderness.   Musculoskeletal: Normal range of motion. He exhibits no edema or tenderness.   Lymphadenopathy:     He has no cervical adenopathy.   Neurological: He is alert and oriented to person, place, and time. He has normal reflexes. No cranial nerve deficit. Coordination normal.   Skin: Skin is warm and dry. No rash noted.   Psychiatric: He has a normal mood and affect. His behavior is normal.         Assessment:       1. Nonrheumatic aortic valve insufficiency    2. Acute combined systolic and diastolic congestive heart failure    3. H/O aortic root repair    4. History of aortic valve replacement    5. Chronic combined systolic and diastolic congestive heart failure         Plan:     prescribed metoprolol succinate 25 mg daily.  Instructed on chest wall exercises.                "

## 2019-07-23 ENCOUNTER — OFFICE VISIT (OUTPATIENT)
Dept: CARDIOLOGY | Facility: CLINIC | Age: 59
End: 2019-07-23
Attending: INTERNAL MEDICINE
Payer: COMMERCIAL

## 2019-07-23 VITALS
SYSTOLIC BLOOD PRESSURE: 122 MMHG | DIASTOLIC BLOOD PRESSURE: 78 MMHG | BODY MASS INDEX: 21.28 KG/M2 | WEIGHT: 152 LBS | HEIGHT: 71 IN | HEART RATE: 91 BPM

## 2019-07-23 DIAGNOSIS — I35.1 NONRHEUMATIC AORTIC VALVE INSUFFICIENCY: Primary | ICD-10-CM

## 2019-07-23 DIAGNOSIS — Z98.890 H/O AORTIC ROOT REPAIR: ICD-10-CM

## 2019-07-23 DIAGNOSIS — Z87.891 EX-SMOKER: ICD-10-CM

## 2019-07-23 DIAGNOSIS — Z95.2 HISTORY OF AORTIC VALVE REPLACEMENT: ICD-10-CM

## 2019-07-23 DIAGNOSIS — I50.42 CHRONIC COMBINED SYSTOLIC AND DIASTOLIC CONGESTIVE HEART FAILURE: ICD-10-CM

## 2019-07-23 PROCEDURE — 3008F BODY MASS INDEX DOCD: CPT | Mod: CPTII,S$GLB,, | Performed by: INTERNAL MEDICINE

## 2019-07-23 PROCEDURE — 3074F PR MOST RECENT SYSTOLIC BLOOD PRESSURE < 130 MM HG: ICD-10-PCS | Mod: CPTII,S$GLB,, | Performed by: INTERNAL MEDICINE

## 2019-07-23 PROCEDURE — 3078F PR MOST RECENT DIASTOLIC BLOOD PRESSURE < 80 MM HG: ICD-10-PCS | Mod: CPTII,S$GLB,, | Performed by: INTERNAL MEDICINE

## 2019-07-23 PROCEDURE — 93000 ELECTROCARDIOGRAM COMPLETE: CPT | Mod: S$GLB,,, | Performed by: INTERNAL MEDICINE

## 2019-07-23 PROCEDURE — 93000 PR ELECTROCARDIOGRAM, COMPLETE: ICD-10-PCS | Mod: S$GLB,,, | Performed by: INTERNAL MEDICINE

## 2019-07-23 PROCEDURE — 3074F SYST BP LT 130 MM HG: CPT | Mod: CPTII,S$GLB,, | Performed by: INTERNAL MEDICINE

## 2019-07-23 PROCEDURE — 3008F PR BODY MASS INDEX (BMI) DOCUMENTED: ICD-10-PCS | Mod: CPTII,S$GLB,, | Performed by: INTERNAL MEDICINE

## 2019-07-23 PROCEDURE — 99214 OFFICE O/P EST MOD 30 MIN: CPT | Mod: S$GLB,,, | Performed by: INTERNAL MEDICINE

## 2019-07-23 PROCEDURE — 99214 PR OFFICE/OUTPT VISIT, EST, LEVL IV, 30-39 MIN: ICD-10-PCS | Mod: S$GLB,,, | Performed by: INTERNAL MEDICINE

## 2019-07-23 PROCEDURE — 3078F DIAST BP <80 MM HG: CPT | Mod: CPTII,S$GLB,, | Performed by: INTERNAL MEDICINE

## 2019-07-23 NOTE — PROGRESS NOTES
Subjective:    Patient ID:  Devin Lopez is a 59 y.o. male     HPI   Here for follow-up of aortic valve replacement with aortic root repair for aortic insufficiency and congestive heart failure, ex cigarette smoker.    I feel well, I get around without any complaints.  No shortness of breath.    Current Outpatient Medications   Medication Sig    aspirin 325 MG tablet Take 1 tablet (325 mg total) by mouth once daily.    atorvastatin (LIPITOR) 40 MG tablet Take 1 tablet (40 mg total) by mouth once daily.    metoprolol succinate (TOPROL-XL) 25 MG 24 hr tablet Take 1 tablet (25 mg total) by mouth once daily.    pantoprazole (PROTONIX) 40 MG tablet Take 1 tablet (40 mg total) by mouth once daily.     No current facility-administered medications for this visit.          Review of Systems   Constitution: Negative for chills, decreased appetite, fever, weight gain and weight loss.   HENT: Negative for congestion, hearing loss and sore throat.    Eyes: Negative for blurred vision, double vision and visual disturbance.   Cardiovascular: Negative for chest pain, claudication, dyspnea on exertion, leg swelling, palpitations and syncope.   Respiratory: Negative for cough, hemoptysis, shortness of breath, sputum production and wheezing.    Endocrine: Negative for cold intolerance and heat intolerance.   Hematologic/Lymphatic: Negative for bleeding problem. Does not bruise/bleed easily.   Skin: Negative for color change, dry skin, flushing and itching.   Musculoskeletal: Negative for back pain, joint pain and myalgias.   Gastrointestinal: Negative for abdominal pain, anorexia, constipation, diarrhea, dysphagia, nausea and vomiting.        No bleeding per rectum   Genitourinary: Negative for dysuria, flank pain, frequency, hematuria and nocturia.   Neurological: Negative for dizziness, headaches, light-headedness, loss of balance, seizures and tremors.   Psychiatric/Behavioral: Negative for altered mental status and  "depression.     Vitals:    07/23/19 1252   BP: 122/78   Pulse: 91   Weight: 68.9 kg (152 lb)   Height: 5' 11" (1.803 m)        Objective:    Physical Exam   Constitutional: He is oriented to person, place, and time. He appears well-developed and well-nourished.   HENT:   Head: Normocephalic and atraumatic.   Right Ear: External ear normal.   Left Ear: External ear normal.   Nose: Nose normal.   Eyes: Pupils are equal, round, and reactive to light. Conjunctivae and EOM are normal. No scleral icterus.   Neck: Normal range of motion. Neck supple. No JVD present. No tracheal deviation present. No thyromegaly present.   Cardiovascular: Normal rate. Exam reveals no gallop and no friction rub.   Murmur heard.  Soft basal ejection systolic murmur.  Skips   Pulmonary/Chest: Effort normal and breath sounds normal. No respiratory distress. He has no rales. He exhibits no tenderness.   Abdominal: Soft. Bowel sounds are normal. He exhibits no distension and no mass. There is no tenderness.   Musculoskeletal: Normal range of motion. He exhibits no edema or tenderness.   Lymphadenopathy:     He has no cervical adenopathy.   Neurological: He is alert and oriented to person, place, and time. He has normal reflexes. No cranial nerve deficit. Coordination normal.   Skin: Skin is warm and dry. No rash noted.   Psychiatric: He has a normal mood and affect. His behavior is normal.       EKG shows frequent PVCs    Assessment:       1. Nonrheumatic aortic valve insufficiency    2. History of aortic valve replacement    3. H/O aortic root repair    4. Chronic combined systolic and diastolic congestive heart failure    5. Ex-smoker         Plan:       Stable.  Continue present pharmacological regimen.  Check echocardiogram at the next visit.            "

## 2019-10-08 DIAGNOSIS — I35.1 NONRHEUMATIC AORTIC VALVE INSUFFICIENCY: Primary | ICD-10-CM

## 2019-10-23 ENCOUNTER — OFFICE VISIT (OUTPATIENT)
Dept: CARDIOLOGY | Facility: CLINIC | Age: 59
End: 2019-10-23
Attending: INTERNAL MEDICINE
Payer: COMMERCIAL

## 2019-10-23 VITALS
SYSTOLIC BLOOD PRESSURE: 146 MMHG | WEIGHT: 155 LBS | HEART RATE: 81 BPM | DIASTOLIC BLOOD PRESSURE: 88 MMHG | HEIGHT: 71 IN | BODY MASS INDEX: 21.7 KG/M2

## 2019-10-23 DIAGNOSIS — I50.42 CHRONIC COMBINED SYSTOLIC AND DIASTOLIC CONGESTIVE HEART FAILURE: Primary | ICD-10-CM

## 2019-10-23 DIAGNOSIS — Z87.891 EX-SMOKER: ICD-10-CM

## 2019-10-23 DIAGNOSIS — Z95.2 HISTORY OF AORTIC VALVE REPLACEMENT: ICD-10-CM

## 2019-10-23 DIAGNOSIS — Z98.890 H/O AORTIC ROOT REPAIR: ICD-10-CM

## 2019-10-23 DIAGNOSIS — I71.21 ASCENDING AORTIC ANEURYSM: ICD-10-CM

## 2019-10-23 DIAGNOSIS — I35.1 NONRHEUMATIC AORTIC VALVE INSUFFICIENCY: ICD-10-CM

## 2019-10-23 PROCEDURE — 99214 OFFICE O/P EST MOD 30 MIN: CPT | Mod: S$GLB,,, | Performed by: INTERNAL MEDICINE

## 2019-10-23 PROCEDURE — 3008F PR BODY MASS INDEX (BMI) DOCUMENTED: ICD-10-PCS | Mod: CPTII,S$GLB,, | Performed by: INTERNAL MEDICINE

## 2019-10-23 PROCEDURE — 3079F DIAST BP 80-89 MM HG: CPT | Mod: CPTII,S$GLB,, | Performed by: INTERNAL MEDICINE

## 2019-10-23 PROCEDURE — 3077F SYST BP >= 140 MM HG: CPT | Mod: CPTII,S$GLB,, | Performed by: INTERNAL MEDICINE

## 2019-10-23 PROCEDURE — 3008F BODY MASS INDEX DOCD: CPT | Mod: CPTII,S$GLB,, | Performed by: INTERNAL MEDICINE

## 2019-10-23 PROCEDURE — 99214 PR OFFICE/OUTPT VISIT, EST, LEVL IV, 30-39 MIN: ICD-10-PCS | Mod: S$GLB,,, | Performed by: INTERNAL MEDICINE

## 2019-10-23 PROCEDURE — 3079F PR MOST RECENT DIASTOLIC BLOOD PRESSURE 80-89 MM HG: ICD-10-PCS | Mod: CPTII,S$GLB,, | Performed by: INTERNAL MEDICINE

## 2019-10-23 PROCEDURE — 3077F PR MOST RECENT SYSTOLIC BLOOD PRESSURE >= 140 MM HG: ICD-10-PCS | Mod: CPTII,S$GLB,, | Performed by: INTERNAL MEDICINE

## 2019-10-23 NOTE — PROGRESS NOTES
Subjective:    Patient ID:  Devin Lopez is a 59 y.o. male     HPI  Here for follow-up of aortic valve replacement with aortic root repair for aortic insufficiency and congestive heart failure, ex cigarette smoker.     I feel well.  I have no complaints.  I stay physically quite active.  I have no trouble lying on my side or on my stomach during sleep.    Current Outpatient Medications   Medication Sig    aspirin 325 MG tablet Take 1 tablet (325 mg total) by mouth once daily.    atorvastatin (LIPITOR) 40 MG tablet Take 1 tablet (40 mg total) by mouth once daily.    metoprolol succinate (TOPROL-XL) 25 MG 24 hr tablet Take 1 tablet (25 mg total) by mouth once daily.    pantoprazole (PROTONIX) 40 MG tablet Take 1 tablet (40 mg total) by mouth once daily.     No current facility-administered medications for this visit.        Review of Systems   Constitution: Negative for chills, decreased appetite, fever, weight gain and weight loss.   HENT: Negative for congestion, hearing loss and sore throat.    Eyes: Negative for blurred vision, double vision and visual disturbance.   Cardiovascular: Negative for chest pain, claudication, dyspnea on exertion, leg swelling, palpitations and syncope.   Respiratory: Negative for cough, hemoptysis, shortness of breath, sputum production and wheezing.    Endocrine: Negative for cold intolerance and heat intolerance.   Hematologic/Lymphatic: Negative for bleeding problem. Does not bruise/bleed easily.   Skin: Negative for color change, dry skin, flushing and itching.   Musculoskeletal: Negative for back pain, joint pain and myalgias.   Gastrointestinal: Negative for abdominal pain, anorexia, constipation, diarrhea, dysphagia, nausea and vomiting.        No bleeding per rectum   Genitourinary: Negative for dysuria, flank pain, frequency, hematuria and nocturia.   Neurological: Negative for dizziness, headaches, light-headedness, loss of balance, seizures and tremors.  "  Psychiatric/Behavioral: Negative for altered mental status and depression.         Vitals:    10/23/19 1239   BP: (!) 146/88   Pulse: 81   Weight: 70.3 kg (155 lb)   Height: 5' 11" (1.803 m)    Blood pressure recheck 138/78.    Objective:    Physical Exam   Constitutional: He is oriented to person, place, and time. He appears well-developed and well-nourished.   HENT:   Head: Normocephalic and atraumatic.   Right Ear: External ear normal.   Left Ear: External ear normal.   Nose: Nose normal.   Eyes: Pupils are equal, round, and reactive to light. Conjunctivae and EOM are normal. No scleral icterus.   Neck: Normal range of motion. Neck supple. No JVD present. No tracheal deviation present. No thyromegaly present.   Cardiovascular: Normal rate, regular rhythm and normal heart sounds. Exam reveals no gallop and no friction rub.   No murmur heard.  Pulmonary/Chest: Effort normal and breath sounds normal. No respiratory distress. He has no rales. He exhibits no tenderness.   Abdominal: Soft. Bowel sounds are normal. He exhibits no distension and no mass. There is no tenderness.   Musculoskeletal: Normal range of motion. He exhibits no edema or tenderness.   Lymphadenopathy:     He has no cervical adenopathy.   Neurological: He is alert and oriented to person, place, and time. He has normal reflexes. No cranial nerve deficit. Coordination normal.   Skin: Skin is warm and dry. No rash noted.   Psychiatric: He has a normal mood and affect. His behavior is normal.       echocardiogram reviewed, and discussed with the patient.    Assessment:       1. Chronic combined systolic and diastolic congestive heart failure    2. History of aortic valve replacement    3. H/O aortic root repair    4. Nonrheumatic aortic valve insufficiency    5. Ascending aortic aneurysm    6. Ex-smoker         Plan:       Stable.  Continue present pharmacological regimen.            "

## 2019-10-28 LAB
AORTIC ROOT ANNULUS: 2.65 CM
AORTIC VALVE CUSP SEPERATION: 1.36 CM
AV INDEX (PROSTH): 0.36
AV MEAN GRADIENT: 15 MMHG
AV PEAK GRADIENT: 30 MMHG
AV VALVE AREA: 1.09 CM2
AV VELOCITY RATIO: 0.35
CV ECHO LV RWT: 0.47 CM
DOP CALC AO PEAK VEL: 2.72 M/S
DOP CALC AO VTI: 51.74 CM
DOP CALC LVOT AREA: 3 CM2
DOP CALC LVOT DIAMETER: 1.97 CM
DOP CALC LVOT PEAK VEL: 0.96 M/S
DOP CALC LVOT STROKE VOLUME: 56.63 CM3
DOP CALCLVOT PEAK VEL VTI: 18.59 CM
E WAVE DECELERATION TIME: 125.79 MSEC
E/A RATIO: 1.09
ECHO EF ESTIMATED: 42 %
ECHO LV POSTERIOR WALL: 1.16 CM (ref 0.6–1.1)
FRACTIONAL SHORTENING: 21 % (ref 28–44)
INTERVENTRICULAR SEPTUM: 1.16 CM (ref 0.6–1.1)
IVC PROX: 1.1 CM
IVRT: 126 MSEC
LEFT ATRIUM SIZE: 3.99 CM
LEFT INTERNAL DIMENSION IN SYSTOLE: 3.94 CM (ref 2.1–4)
LEFT VENTRICLE DIASTOLIC VOLUME: 116.57 ML
LEFT VENTRICLE SYSTOLIC VOLUME: 67.33 ML
LEFT VENTRICULAR INTERNAL DIMENSION IN DIASTOLE: 4.97 CM (ref 3.5–6)
LEFT VENTRICULAR MASS: 220.79 G
LV LATERAL E/E' RATIO: 0.06 M/S
MV A" WAVE DURATION": 152 MSEC
MV PEAK A VEL: 0.56 M/S
MV PEAK E VEL: 0.61 M/S
PISA TR MAX VEL: 1.83 M/S
PULM VEIN A" WAVE DURATION": 88 MSEC
PULM VEIN S/D RATIO: 0.69
PV PEAK D VEL: 0.54 M/S
PV PEAK S VEL: 0.37 M/S
PV PEAK VELOCITY: 1.68 CM/S
RA PRESSURE: 3 MMHG
RIGHT VENTRICULAR END-DIASTOLIC DIMENSION: 2.23 CM
TDI LATERAL: 10 M/S
TR MAX PG: 13 MMHG
TRICUSPID ANNULAR PLANE SYSTOLIC EXCURSION: 1.56 CM
TRICUSPID VALVE PEAK A WAVE VELOCITY: 0.41 M/S
TV PEAK E VEL: 0.57 M/S
TV REST PULMONARY ARTERY PRESSURE: 16 MMHG

## 2019-12-16 DIAGNOSIS — Z98.890 S/P AORTIC VALVE REPAIR: ICD-10-CM

## 2019-12-16 RX ORDER — METOPROLOL SUCCINATE 25 MG/1
TABLET, EXTENDED RELEASE ORAL
Qty: 30 TABLET | Refills: 6 | Status: SHIPPED | OUTPATIENT
Start: 2019-12-16

## 2019-12-16 RX ORDER — METOPROLOL SUCCINATE 25 MG/1
TABLET, EXTENDED RELEASE ORAL
Qty: 30 TABLET | Refills: 0 | Status: SHIPPED | OUTPATIENT
Start: 2019-12-16 | End: 2019-12-16 | Stop reason: SDUPTHER

## 2020-02-26 ENCOUNTER — OFFICE VISIT (OUTPATIENT)
Dept: CARDIOLOGY | Facility: CLINIC | Age: 60
End: 2020-02-26
Attending: INTERNAL MEDICINE
Payer: COMMERCIAL

## 2020-02-26 VITALS
WEIGHT: 157 LBS | HEIGHT: 71 IN | DIASTOLIC BLOOD PRESSURE: 93 MMHG | BODY MASS INDEX: 21.98 KG/M2 | SYSTOLIC BLOOD PRESSURE: 169 MMHG | HEART RATE: 69 BPM

## 2020-02-26 DIAGNOSIS — Z95.2 HISTORY OF AORTIC VALVE REPLACEMENT: ICD-10-CM

## 2020-02-26 DIAGNOSIS — I71.21 ASCENDING AORTIC ANEURYSM: ICD-10-CM

## 2020-02-26 DIAGNOSIS — I50.42 CHRONIC COMBINED SYSTOLIC AND DIASTOLIC CONGESTIVE HEART FAILURE: ICD-10-CM

## 2020-02-26 DIAGNOSIS — I35.1 NONRHEUMATIC AORTIC VALVE INSUFFICIENCY: Primary | ICD-10-CM

## 2020-02-26 DIAGNOSIS — Q23.1 BICUSPID AORTIC VALVE: ICD-10-CM

## 2020-02-26 DIAGNOSIS — Z87.891 EX-SMOKER: ICD-10-CM

## 2020-02-26 DIAGNOSIS — Z98.890 H/O AORTIC ROOT REPAIR: ICD-10-CM

## 2020-02-26 PROCEDURE — 3080F DIAST BP >= 90 MM HG: CPT | Mod: CPTII,S$GLB,, | Performed by: INTERNAL MEDICINE

## 2020-02-26 PROCEDURE — 3008F PR BODY MASS INDEX (BMI) DOCUMENTED: ICD-10-PCS | Mod: CPTII,S$GLB,, | Performed by: INTERNAL MEDICINE

## 2020-02-26 PROCEDURE — 3080F PR MOST RECENT DIASTOLIC BLOOD PRESSURE >= 90 MM HG: ICD-10-PCS | Mod: CPTII,S$GLB,, | Performed by: INTERNAL MEDICINE

## 2020-02-26 PROCEDURE — 99213 PR OFFICE/OUTPT VISIT, EST, LEVL III, 20-29 MIN: ICD-10-PCS | Mod: S$GLB,,, | Performed by: INTERNAL MEDICINE

## 2020-02-26 PROCEDURE — 3077F SYST BP >= 140 MM HG: CPT | Mod: CPTII,S$GLB,, | Performed by: INTERNAL MEDICINE

## 2020-02-26 PROCEDURE — 3077F PR MOST RECENT SYSTOLIC BLOOD PRESSURE >= 140 MM HG: ICD-10-PCS | Mod: CPTII,S$GLB,, | Performed by: INTERNAL MEDICINE

## 2020-02-26 PROCEDURE — 3008F BODY MASS INDEX DOCD: CPT | Mod: CPTII,S$GLB,, | Performed by: INTERNAL MEDICINE

## 2020-02-26 PROCEDURE — 99213 OFFICE O/P EST LOW 20 MIN: CPT | Mod: S$GLB,,, | Performed by: INTERNAL MEDICINE

## 2020-02-26 RX ORDER — LOSARTAN POTASSIUM 50 MG/1
50 TABLET ORAL DAILY
Qty: 60 TABLET | Refills: 6 | Status: SHIPPED | OUTPATIENT
Start: 2020-02-26

## 2020-02-26 RX ORDER — LOSARTAN POTASSIUM 50 MG/1
50 TABLET ORAL DAILY
COMMUNITY
End: 2020-02-26 | Stop reason: SDUPTHER

## 2020-02-27 NOTE — PROGRESS NOTES
Subjective:    Patient ID:  Devin Lopez is a 59 y.o. male     HPI   Here for follow-up of aortic valve replacement with aortic root repair for aortic insufficiency and congestive heart failure, ex cigarette smoker.     I feel well.  I have no complaints.    Current Outpatient Medications   Medication Sig    aspirin 325 MG tablet Take 1 tablet (325 mg total) by mouth once daily.    atorvastatin (LIPITOR) 40 MG tablet Take 1 tablet (40 mg total) by mouth once daily.    losartan (COZAAR) 50 MG tablet Take 1 tablet (50 mg total) by mouth once daily.    metoprolol succinate (TOPROL-XL) 25 MG 24 hr tablet TAKE 1 TABLET(25 MG) BY MOUTH EVERY DAY    pantoprazole (PROTONIX) 40 MG tablet Take 1 tablet (40 mg total) by mouth once daily.     No current facility-administered medications for this visit.          Review of Systems   Constitution: Negative for chills, decreased appetite, fever, weight gain and weight loss.   HENT: Negative for congestion, hearing loss and sore throat.    Eyes: Negative for blurred vision, double vision and visual disturbance.   Cardiovascular: Negative for chest pain, claudication, dyspnea on exertion, leg swelling, palpitations and syncope.   Respiratory: Negative for cough, hemoptysis, shortness of breath, sputum production and wheezing.    Endocrine: Negative for cold intolerance and heat intolerance.   Hematologic/Lymphatic: Negative for bleeding problem. Does not bruise/bleed easily.   Skin: Negative for color change, dry skin, flushing and itching.   Musculoskeletal: Negative for back pain, joint pain and myalgias.   Gastrointestinal: Negative for abdominal pain, anorexia, constipation, diarrhea, dysphagia, nausea and vomiting.        No bleeding per rectum   Genitourinary: Negative for dysuria, flank pain, frequency, hematuria and nocturia.   Neurological: Negative for dizziness, headaches, light-headedness, loss of balance, seizures and tremors.   Psychiatric/Behavioral: Negative  "for altered mental status and depression.         Vitals:    02/26/20 1138   BP: (!) 169/93   Pulse: 69   Weight: 71.2 kg (157 lb)   Height: 5' 11" (1.803 m)     Objective:    Physical Exam   Constitutional: He is oriented to person, place, and time. He appears well-developed and well-nourished.   HENT:   Head: Normocephalic and atraumatic.   Right Ear: External ear normal.   Left Ear: External ear normal.   Nose: Nose normal.   Eyes: Pupils are equal, round, and reactive to light. Conjunctivae and EOM are normal. No scleral icterus.   Neck: Normal range of motion. Neck supple. No JVD present. No tracheal deviation present. No thyromegaly present.   Cardiovascular: Normal rate and regular rhythm. Exam reveals no gallop and no friction rub.   Murmur heard.  2/6 basal ejection systolic murmur   Pulmonary/Chest: Effort normal and breath sounds normal. No respiratory distress. He has no rales. He exhibits no tenderness.   Abdominal: Soft. Bowel sounds are normal. He exhibits no distension and no mass. There is no tenderness.   Musculoskeletal: Normal range of motion. He exhibits no edema or tenderness.   Lymphadenopathy:     He has no cervical adenopathy.   Neurological: He is alert and oriented to person, place, and time. He has normal reflexes. No cranial nerve deficit. Coordination normal.   Skin: Skin is warm and dry. No rash noted.   Psychiatric: He has a normal mood and affect. His behavior is normal.         Assessment:       1. Nonrheumatic aortic valve insufficiency    2. Bicuspid aortic valve    3. Chronic combined systolic and diastolic congestive heart failure    4. Ascending aortic aneurysm    5. History of aortic valve replacement    6. H/O aortic root repair    7. Ex-smoker         Plan:       Resume losartan 50 mg p.o. Daily  Monitor blood pressure.            "

## 2020-04-07 RX ORDER — PANTOPRAZOLE SODIUM 40 MG/1
40 TABLET, DELAYED RELEASE ORAL DAILY
Qty: 90 TABLET | Refills: 3 | Status: SHIPPED | OUTPATIENT
Start: 2020-04-07 | End: 2021-04-07

## 2020-04-07 RX ORDER — ATORVASTATIN CALCIUM 40 MG/1
40 TABLET, FILM COATED ORAL DAILY
Qty: 90 TABLET | Refills: 3 | Status: SHIPPED | OUTPATIENT
Start: 2020-04-07 | End: 2021-04-07

## 2020-06-04 ENCOUNTER — OFFICE VISIT (OUTPATIENT)
Dept: CARDIOLOGY | Facility: CLINIC | Age: 60
End: 2020-06-04
Attending: INTERNAL MEDICINE
Payer: COMMERCIAL

## 2020-06-04 VITALS
HEART RATE: 74 BPM | BODY MASS INDEX: 21.84 KG/M2 | WEIGHT: 156 LBS | SYSTOLIC BLOOD PRESSURE: 152 MMHG | DIASTOLIC BLOOD PRESSURE: 87 MMHG | HEIGHT: 71 IN

## 2020-06-04 DIAGNOSIS — Z87.891 EX-SMOKER: ICD-10-CM

## 2020-06-04 DIAGNOSIS — I71.21 ASCENDING AORTIC ANEURYSM: ICD-10-CM

## 2020-06-04 DIAGNOSIS — I50.42 CHRONIC COMBINED SYSTOLIC AND DIASTOLIC CONGESTIVE HEART FAILURE: ICD-10-CM

## 2020-06-04 DIAGNOSIS — I65.23 BILATERAL CAROTID ARTERY STENOSIS: ICD-10-CM

## 2020-06-04 DIAGNOSIS — Q23.1 BICUSPID AORTIC VALVE: Primary | ICD-10-CM

## 2020-06-04 DIAGNOSIS — Z98.890 H/O AORTIC ROOT REPAIR: ICD-10-CM

## 2020-06-04 DIAGNOSIS — I35.1 NONRHEUMATIC AORTIC VALVE INSUFFICIENCY: ICD-10-CM

## 2020-06-04 DIAGNOSIS — Z95.2 HISTORY OF AORTIC VALVE REPLACEMENT: ICD-10-CM

## 2020-06-04 PROCEDURE — 99213 OFFICE O/P EST LOW 20 MIN: CPT | Mod: S$GLB,,, | Performed by: INTERNAL MEDICINE

## 2020-06-04 PROCEDURE — 3079F DIAST BP 80-89 MM HG: CPT | Mod: CPTII,S$GLB,, | Performed by: INTERNAL MEDICINE

## 2020-06-04 PROCEDURE — 3008F BODY MASS INDEX DOCD: CPT | Mod: CPTII,S$GLB,, | Performed by: INTERNAL MEDICINE

## 2020-06-04 PROCEDURE — 3077F SYST BP >= 140 MM HG: CPT | Mod: CPTII,S$GLB,, | Performed by: INTERNAL MEDICINE

## 2020-06-04 PROCEDURE — 3008F PR BODY MASS INDEX (BMI) DOCUMENTED: ICD-10-PCS | Mod: CPTII,S$GLB,, | Performed by: INTERNAL MEDICINE

## 2020-06-04 PROCEDURE — 99213 PR OFFICE/OUTPT VISIT, EST, LEVL III, 20-29 MIN: ICD-10-PCS | Mod: S$GLB,,, | Performed by: INTERNAL MEDICINE

## 2020-06-04 PROCEDURE — 3079F PR MOST RECENT DIASTOLIC BLOOD PRESSURE 80-89 MM HG: ICD-10-PCS | Mod: CPTII,S$GLB,, | Performed by: INTERNAL MEDICINE

## 2020-06-04 PROCEDURE — 3077F PR MOST RECENT SYSTOLIC BLOOD PRESSURE >= 140 MM HG: ICD-10-PCS | Mod: CPTII,S$GLB,, | Performed by: INTERNAL MEDICINE

## 2020-06-04 NOTE — PROGRESS NOTES
Subjective:    Patient ID:  Devin Lopez is a 60 y.o. male     HPI Here for follow-up of aortic valve replacement ([employer] mosaic bioprosthesis) with aortic root repair for aortic insufficiency and congestive heart failure, ex cigarette smoker.     My neck is beginning to bother me again, 30 years after my surgery, my right arm sometimes goes to sleep, my right leg sometimes feels wobbly.  Have no shortness of breath.    Current Outpatient Medications   Medication Sig    aspirin 325 MG tablet Take 1 tablet (325 mg total) by mouth once daily.    atorvastatin (LIPITOR) 40 MG tablet Take 1 tablet (40 mg total) by mouth once daily.    losartan (COZAAR) 50 MG tablet Take 1 tablet (50 mg total) by mouth once daily.    metoprolol succinate (TOPROL-XL) 25 MG 24 hr tablet TAKE 1 TABLET(25 MG) BY MOUTH EVERY DAY    pantoprazole (PROTONIX) 40 MG tablet Take 1 tablet (40 mg total) by mouth once daily.     No current facility-administered medications for this visit.        Review of Systems   Constitution: Negative for chills, decreased appetite, fever, weight gain and weight loss.   HENT: Negative for congestion, hearing loss and sore throat.    Eyes: Negative for blurred vision, double vision and visual disturbance.   Cardiovascular: Negative for chest pain, claudication, dyspnea on exertion, leg swelling, palpitations and syncope.   Respiratory: Negative for cough, hemoptysis, shortness of breath, sputum production and wheezing.    Endocrine: Negative for cold intolerance and heat intolerance.   Hematologic/Lymphatic: Negative for bleeding problem. Does not bruise/bleed easily.   Skin: Negative for color change, dry skin, flushing and itching.   Musculoskeletal: Positive for neck pain. Negative for back pain, joint pain and myalgias.   Gastrointestinal: Negative for abdominal pain, anorexia, constipation, diarrhea, dysphagia, nausea and vomiting.        No bleeding per rectum   Genitourinary: Negative for dysuria,  "flank pain, frequency, hematuria and nocturia.   Neurological: Negative for dizziness, headaches, light-headedness, loss of balance, seizures and tremors.   Psychiatric/Behavioral: Negative for altered mental status and depression.         Vitals:    06/04/20 1211   BP: (!) 152/87   Pulse: 74   Weight: 70.8 kg (156 lb)   Height: 5' 11" (1.803 m)    Blood pressure recheck 140/76.  Objective:    Physical Exam   Constitutional: He is oriented to person, place, and time. He appears well-developed and well-nourished.   HENT:   Head: Normocephalic and atraumatic.   Right Ear: External ear normal.   Left Ear: External ear normal.   Nose: Nose normal.   Eyes: Pupils are equal, round, and reactive to light. Conjunctivae and EOM are normal. No scleral icterus.   Neck: Normal range of motion. Neck supple. No JVD present. No tracheal deviation present. No thyromegaly present.   Cardiovascular: Normal rate and regular rhythm. Exam reveals no gallop and no friction rub.   Murmur heard.  Basal ejection systolic murmur 3/6   Pulmonary/Chest: Effort normal and breath sounds normal. No respiratory distress. He has no rales. He exhibits no tenderness.   Abdominal: Soft. Bowel sounds are normal. He exhibits no distension and no mass. There is no tenderness.   Musculoskeletal: Normal range of motion. He exhibits no edema or tenderness.   Lymphadenopathy:     He has no cervical adenopathy.   Neurological: He is alert and oriented to person, place, and time. He has normal reflexes. No cranial nerve deficit. Coordination normal.   Skin: Skin is warm and dry. No rash noted.   Psychiatric: He has a normal mood and affect. His behavior is normal.         Assessment:       1. Bicuspid aortic valve    2. Nonrheumatic aortic valve insufficiency    3. Chronic combined systolic and diastolic congestive heart failure    4. History of aortic valve replacement    5. H/O aortic root repair    6. Ascending aortic aneurysm    7. Bilateral carotid artery " stenosis    8. Ex-smoker         Plan:       Continue present pharmacological regimen.  See Dr. Gus Ryan.

## 2020-06-16 ENCOUNTER — HOSPITAL ENCOUNTER (OUTPATIENT)
Dept: RADIOLOGY | Facility: OTHER | Age: 60
Discharge: HOME | End: 2020-06-16
Attending: ORTHOPAEDIC SURGERY
Payer: COMMERCIAL

## 2020-06-16 DIAGNOSIS — M54.50 LOW BACK PAIN: ICD-10-CM

## 2020-06-16 DIAGNOSIS — M48.07 SPINAL STENOSIS, LUMBOSACRAL REGION: ICD-10-CM

## 2020-06-16 DIAGNOSIS — M41.85 OTHER FORM OF SCOLIOSIS OF THORACOLUMBAR SPINE: ICD-10-CM

## 2020-06-16 DIAGNOSIS — M51.36 OTHER INTERVERTEBRAL DISC DEGENERATION, LUMBAR REGION: ICD-10-CM

## 2020-06-16 DIAGNOSIS — M48.062 SPINAL STENOSIS, LUMBAR REGION WITH NEUROGENIC CLAUDICATION: ICD-10-CM

## 2020-06-16 DIAGNOSIS — M50.023 CERVICAL DISC DISORDER AT C6-C7 LEVEL WITH MYELOPATHY: ICD-10-CM

## 2020-06-16 DIAGNOSIS — M96.0 PSEUDARTHROSIS AFTER FUSION OR ARTHRODESIS: ICD-10-CM

## 2020-06-16 DIAGNOSIS — M50.01 CERVICAL DISC DISORDER WITH MYELOPATHY, HIGH CERVICAL REGION: ICD-10-CM

## 2020-06-16 DIAGNOSIS — M54.2 CERVICALGIA: ICD-10-CM

## 2020-06-16 DIAGNOSIS — M50.30 OTHER CERVICAL DISC DEGENERATION, UNSPECIFIED CERVICAL REGION: ICD-10-CM

## 2020-06-16 PROCEDURE — 72148 MRI LUMBAR SPINE W/O DYE: CPT | Mod: TC

## 2020-06-16 PROCEDURE — 72141 MRI CERVICAL SPINE WITHOUT CONTRAST: ICD-10-PCS | Mod: 26,,, | Performed by: INTERNAL MEDICINE

## 2020-06-16 PROCEDURE — 72148 MRI LUMBAR SPINE W/O DYE: CPT | Mod: 26,,, | Performed by: INTERNAL MEDICINE

## 2020-06-16 PROCEDURE — 72141 MRI NECK SPINE W/O DYE: CPT | Mod: 26,,, | Performed by: INTERNAL MEDICINE

## 2020-06-16 PROCEDURE — 72141 MRI NECK SPINE W/O DYE: CPT | Mod: TC

## 2020-06-16 PROCEDURE — 72148 MRI LUMBAR SPINE WITHOUT CONTRAST: ICD-10-PCS | Mod: 26,,, | Performed by: INTERNAL MEDICINE

## 2020-07-29 ENCOUNTER — OFFICE VISIT (OUTPATIENT)
Dept: SPORTS MEDICINE | Facility: CLINIC | Age: 60
End: 2020-07-29
Payer: COMMERCIAL

## 2020-07-29 ENCOUNTER — OFFICE VISIT (OUTPATIENT)
Dept: SPINE | Facility: CLINIC | Age: 60
End: 2020-07-29
Payer: COMMERCIAL

## 2020-07-29 VITALS
BODY MASS INDEX: 22.16 KG/M2 | TEMPERATURE: 97 F | WEIGHT: 158.31 LBS | HEART RATE: 75 BPM | DIASTOLIC BLOOD PRESSURE: 78 MMHG | SYSTOLIC BLOOD PRESSURE: 126 MMHG | OXYGEN SATURATION: 98 % | HEIGHT: 71 IN

## 2020-07-29 VITALS
WEIGHT: 158.31 LBS | HEART RATE: 75 BPM | HEIGHT: 71 IN | BODY MASS INDEX: 22.16 KG/M2 | DIASTOLIC BLOOD PRESSURE: 78 MMHG | SYSTOLIC BLOOD PRESSURE: 126 MMHG

## 2020-07-29 DIAGNOSIS — M51.36 DDD (DEGENERATIVE DISC DISEASE), LUMBAR: ICD-10-CM

## 2020-07-29 DIAGNOSIS — G95.9 CERVICAL MYELOPATHY: Primary | ICD-10-CM

## 2020-07-29 DIAGNOSIS — G95.89 MYELOMALACIA OF CERVICAL CORD: ICD-10-CM

## 2020-07-29 DIAGNOSIS — M41.9 SCOLIOSIS, UNSPECIFIED SCOLIOSIS TYPE, UNSPECIFIED SPINAL REGION: ICD-10-CM

## 2020-07-29 DIAGNOSIS — M48.061 FORAMINAL STENOSIS OF LUMBAR REGION: ICD-10-CM

## 2020-07-29 DIAGNOSIS — R29.818 NEUROGENIC CLAUDICATION: ICD-10-CM

## 2020-07-29 DIAGNOSIS — Z98.1 HISTORY OF FUSION OF CERVICAL SPINE: ICD-10-CM

## 2020-07-29 DIAGNOSIS — M50.30 DDD (DEGENERATIVE DISC DISEASE), CERVICAL: Primary | ICD-10-CM

## 2020-07-29 PROCEDURE — 3008F BODY MASS INDEX DOCD: CPT | Mod: S$GLB,,, | Performed by: PHYSICAL MEDICINE & REHABILITATION

## 2020-07-29 PROCEDURE — 3074F SYST BP LT 130 MM HG: CPT | Mod: S$GLB,,, | Performed by: PHYSICAL MEDICINE & REHABILITATION

## 2020-07-29 PROCEDURE — 99999 PR PBB SHADOW E&M-EST. PATIENT-LVL III: CPT | Mod: PBBFAC,,, | Performed by: FAMILY MEDICINE

## 2020-07-29 PROCEDURE — 3074F PR MOST RECENT SYSTOLIC BLOOD PRESSURE < 130 MM HG: ICD-10-PCS | Mod: S$GLB,,, | Performed by: PHYSICAL MEDICINE & REHABILITATION

## 2020-07-29 PROCEDURE — 3008F BODY MASS INDEX DOCD: CPT | Mod: CPTII,S$GLB,, | Performed by: FAMILY MEDICINE

## 2020-07-29 PROCEDURE — 3008F PR BODY MASS INDEX (BMI) DOCUMENTED: ICD-10-PCS | Mod: S$GLB,,, | Performed by: PHYSICAL MEDICINE & REHABILITATION

## 2020-07-29 PROCEDURE — 99204 OFFICE O/P NEW MOD 45 MIN: CPT | Mod: S$GLB,,, | Performed by: FAMILY MEDICINE

## 2020-07-29 PROCEDURE — 3078F PR MOST RECENT DIASTOLIC BLOOD PRESSURE < 80 MM HG: ICD-10-PCS | Mod: CPTII,S$GLB,, | Performed by: FAMILY MEDICINE

## 2020-07-29 PROCEDURE — 3008F PR BODY MASS INDEX (BMI) DOCUMENTED: ICD-10-PCS | Mod: CPTII,S$GLB,, | Performed by: FAMILY MEDICINE

## 2020-07-29 PROCEDURE — 99204 PR OFFICE/OUTPT VISIT, NEW, LEVL IV, 45-59 MIN: ICD-10-PCS | Mod: S$GLB,,, | Performed by: FAMILY MEDICINE

## 2020-07-29 PROCEDURE — 3078F DIAST BP <80 MM HG: CPT | Mod: CPTII,S$GLB,, | Performed by: FAMILY MEDICINE

## 2020-07-29 PROCEDURE — 3078F DIAST BP <80 MM HG: CPT | Mod: S$GLB,,, | Performed by: PHYSICAL MEDICINE & REHABILITATION

## 2020-07-29 PROCEDURE — 3074F SYST BP LT 130 MM HG: CPT | Mod: CPTII,S$GLB,, | Performed by: FAMILY MEDICINE

## 2020-07-29 PROCEDURE — 3074F PR MOST RECENT SYSTOLIC BLOOD PRESSURE < 130 MM HG: ICD-10-PCS | Mod: CPTII,S$GLB,, | Performed by: FAMILY MEDICINE

## 2020-07-29 PROCEDURE — 3078F PR MOST RECENT DIASTOLIC BLOOD PRESSURE < 80 MM HG: ICD-10-PCS | Mod: S$GLB,,, | Performed by: PHYSICAL MEDICINE & REHABILITATION

## 2020-07-29 PROCEDURE — 99204 OFFICE O/P NEW MOD 45 MIN: CPT | Mod: S$GLB,,, | Performed by: PHYSICAL MEDICINE & REHABILITATION

## 2020-07-29 PROCEDURE — 99999 PR PBB SHADOW E&M-EST. PATIENT-LVL III: ICD-10-PCS | Mod: PBBFAC,,, | Performed by: FAMILY MEDICINE

## 2020-07-29 PROCEDURE — 99204 PR OFFICE/OUTPT VISIT, NEW, LEVL IV, 45-59 MIN: ICD-10-PCS | Mod: S$GLB,,, | Performed by: PHYSICAL MEDICINE & REHABILITATION

## 2020-07-29 NOTE — PROGRESS NOTES
Subjective:          Chief Complaint: Devin Lopez is a 60 y.o. male who had concerns including Pain of the Neck.    Patient here today wanting a 2nd opinion been options for his neck.  He has had a history of neck surgery 20-25 years ago, cervical fusion per patient.  He recently had an MRI of his C-spine and lumbar spine ordered by Dr. Ryan, orthopedic surgeon on the [place], who looked at it and told him he needed to have surgery on his neck and possibly his back as well.  He is wanting to see if he has other options for treatment besides surgery.  Complains of neck pain has been persistent for several years.  It limits what he can do in day-to-day life.  He is not able to  heavy objects although sometimes he still tries in causes himself more pain.  He does experience numbness and tingling sent to his upper extremities echo to his fingertips.  He has not had success for this pain with any oral medications.  He does not remember if he has ever tried injections for his neck or back before.          Review of Systems   Constitution: Negative for chills and decreased appetite.   HENT: Negative for congestion and sore throat.    Eyes: Negative for blurred vision.   Cardiovascular: Negative for chest pain, dyspnea on exertion and palpitations.   Respiratory: Negative for cough and shortness of breath.    Skin: Negative for rash.   Neurological: Negative for difficulty with concentration, disturbances in coordination and headaches.   Psychiatric/Behavioral: Negative for altered mental status, depression, hallucinations, memory loss and suicidal ideas.                   Objective:        General: Devin is well-developed, well-nourished, appears stated age, in no acute distress, alert and oriented to time, place and person.     General    Nursing note and vitals reviewed.  Constitutional: He is oriented to person, place, and time. He appears well-developed and well-nourished.   HENT:   Nose: Nose normal.    Eyes: EOM are normal. Pupils are equal, round, and reactive to light.   Neck: Normal range of motion. Neck supple.   Cardiovascular: Normal rate.    Pulmonary/Chest: Effort normal.   Abdominal: Soft.   Neurological: He is alert and oriented to person, place, and time. He has normal reflexes.   Psychiatric: He has a normal mood and affect. His behavior is normal. Judgment and thought content normal.         Back (L-Spine & T-Spine) / Neck (C-Spine) Exam     Tenderness Posterior midline palpation reveals tenderness of the Lower L-Spine. Right paramedian tenderness of the Lower L-Spine, Upper C-Spine and Lower C-Spine. Left paramedian tenderness of the Lower L-Spine, Lower C-Spine and Upper C-Spine.     Back (L-Spine & T-Spine) Range of Motion   Extension: 50   Flexion: 80   Lateral bend right: normal   Lateral bend left: normal   Rotation right: normal   Rotation left: normal     Neck (C-Spine) Range of Motion   Flexion:     Limited  Extension: Limited  Right Lateral Bend: abnormal  Left Lateral Bend: abnormal  Right Rotation: abnormal  Left Rotation: abnormal    Spinal Sensation   Right Side Sensation  L-Spine Level: normal  Left Side Sensation  L-Spine Level: normal    Back (L-Spine & T-Spine) Tests   Right Side Tests  Straight leg raise:      Sitting SLR: 60 degrees      Left Side Tests  Straight leg raise:     Sitting SLR: 60 degrees          Neck (C-Spine) Tests   Spurling's Test   Left:  Negative  Right: negative  Cervical Distraction Test  Right:  Negative  Left:  negative    Other He has no scoliosis .  Spinal Kyphosis:  Absent  Spinal Lordosis:  Absent      Muscle Strength   Right Upper Extremity   Biceps: 5/5/5   Deltoid:  5/5  Triceps:  5/5  Left Upper Extremity  Biceps: 5/5/5   Deltoid:  5/5  Triceps:  5/5  Right Lower Extremity   Hip Abduction: 5/5   Hip Flexion: 5/5   Hip Extensors: 5/5  Quadriceps:  5/5   Hamstrin/5   Left Lower Extremity   Hip Abduction: 5/5   Hip Flexion: 5/5   Hip Extensors:  5/5  Quadriceps:  5/5   Hamstrin/5     Reflexes     Left Side  Quadriceps:  2+    Right Side   Quadriceps:  2+    Vascular Exam     Right Pulses      Radial:                    2+  Carotid:                  2+    Left Pulses      Radial:                    2+  Carotid:                  2+    MRI Lumbar Spine Without Contrast  Order: [id number]  Status:  Final result   Visible to patient:  Yes (Patient Portal)   Next appt:  None   Dx:  Cervicalgia; Low back pain; Pseudarth...  Details    Reading Physician Reading Date Result Priority   Nyasia Hayden MD  [phone] 2020 Routine      Narrative & Impression     EXAMINATION:  MRI LUMBAR SPINE WITHOUT CONTRAST     CLINICAL HISTORY:  Other cervical disc degeneration, unspecified cervical region     TECHNIQUE:  Multiplanar, multisequence MR images were acquired from the thoracolumbar junction to the sacrum without the administration of contrast.     COMPARISON:  No recent prior imaging     FINDINGS:  The distal spinal cord is normal in signal.  The vertebral bodies demonstrate minimal retrolisthesis at L2-3, otherwise normal alignment.  There is no compression deformity.  The L2-3 through L4-5 discs are desiccated and narrowed.  The paravertebral structures are unremarkable.  There are some endplate marrow changes at L4-5 which are typical of degenerative changes, otherwise no abnormal marrow signal.  Scoliosis noted of the thoracolumbar spine.     T11-12 as visualized on the sagittal images demonstrates mild disc bulge.     T12-L1 demonstrates no significant abnormality.     L1-L2 demonstrates facet degenerative change and ligamentum flavum thickening.  There is no spinal canal or foraminal stenosis.     L2-3 demonstrates facet degenerative change, ligamentum flavum thickening and diffuse disc bulge.  There is moderate right and severe left foraminal narrowing.  There is a moderate degree of spinal canal stenosis.     L3-L4 demonstrates facet  degenerative change and ligamentum flavum thickening along with diffuse disc bulge.  There is moderate left foraminal narrowing and severe right foraminal narrowing.  Overall there is a severe degree of spinal canal stenosis.     L4-5 demonstrates facet degenerative change and ligamentum flavum thickening.  There is diffuse broad-based disc bulge with superimposed right foraminal/extraforaminal protrusion.  Overall there is a severe degree of right foraminal stenosis and moderate left foraminal stenosis.  There is severe spinal canal stenosis.     L5-S1 demonstrates facet degenerative change.  There is mild disc bulge but no spinal canal stenosis.  There is mild left foraminal narrowing.     Impression:     Scoliosis and multilevel degenerative change within the lumbar spine, most significant at L2-3 through L4-5 where there is multilevel neural foraminal narrowing and overall moderate to severe degree of spinal canal stenosis as further detailed above.        Electronically signed by: Nyasia Hayden MD  Date:                                            06/17/2020  Time:                                           08:26              MRI Cervical Spine Without Contrast  Order: [id number]  Status:  Final result   Visible to patient:  Yes (Patient Portal)   Next appt:  None   Dx:  Cervicalgia; Low back pain; Pseudarth...  Details    Reading Physician Reading Date Result Priority   Nyasia Hayden MD  [phone]  [phone] 6/17/2020 Routine      Narrative & Impression     EXAMINATION:  MRI CERVICAL SPINE WITHOUT CONTRAST     CLINICAL HISTORY:  .  Other cervical disc degeneration, unspecified cervical region     TECHNIQUE:  Multiplanar, multisequence MR images of the cervical spine were acquired without the administration of contrast.  There is some mild patient motion.     COMPARISON:  No prior imaging of the cervical spine     FINDINGS:  There are surgical changes of an anterior cervical fusion from C4 through C6.   The craniocervical junction is unremarkable.  There is abnormal spinal cord signal at the level of the C6-7 disc with some thinning of the spinal cord.  Additional small regions of abnormal spinal cord signal also noted at the C5-6 level.  There is straightening of the normal cervical lordosis.  There is overall satisfactory alignment of vertebral bodies.  Cervical discs are desiccated.  There is disc space narrowing at C6-7 and C7-T1.  Paravertebral structures are unremarkable.  There is no marrow replacement process.     C2-3 demonstrates no significant abnormality with some minor posterior disc osteophyte complex but no spinal canal or foraminal narrowing.     C3-4 demonstrates posterior disc osteophyte complex  with resulting bilateral neural foraminal narrowing.  There is thinning of the cerebrospinal fluid sleeve about the spinal cord with a mild degree of spinal canal narrowing.     C4-5 demonstrates bilateral neural foraminal narrowing.  There is no significant spinal canal stenosis.     C5-6 demonstrates no significant degree of spinal canal or foraminal narrowing.     C6-7 demonstrates posterior disc osteophyte complex.  There is bilateral neural foraminal narrowing and a moderate degree of spinal canal stenosis.     C7-T1 (as imaged on sagittal only) demonstrates posterior disc osteophyte complex.  Neural foramen cannot be assessed.  Based on the sagittal sequence, a moderate degree of spinal canal narrowing is suspected.     Impression:     In this patient with surgical change of an anterior cervical fusion from C4 through C6, there are multilevel degenerative changes within the cervical spine, as further detailed above, most significant at the 2 levels below the level of the fusion.     Regions of abnormal spinal cord signal at C6-7 and to a lesser extent C5-6, likely myelomalacia on the basis of degenerative change.        Electronically signed by: Nyasia Hayden MD  Date:                                             06/17/2020  Time:                                           09:18           MRI results and images reviewed in clinic with patient by me personally.          Assessment:       Encounter Diagnoses   Name Primary?    DDD (degenerative disc disease), cervical Yes    DDD (degenerative disc disease), lumbar     History of fusion of cervical spine     Myelomalacia of cervical cord     Scoliosis, unspecified scoliosis type, unspecified spinal region     Foraminal stenosis of lumbar region           Plan:       Based off of patient's symptoms and the findings on his MRI I do believe that surgery would be a good option for decompression of his cervical spine as he is clinically symptomatic.  However given that this is not my area of specialty I recommend that he follow-up with back and spine clinic to see for sure if there are other options besides operative treatment for his C-spine and L-spine.  Will refer to Dr. Lucero at the back and spine clinic for consultation.                    Patient questionnaires may have been collected.

## 2020-07-29 NOTE — LETTER
July 29, 2020      Marcus Weber MD  [address]  [place] [address]           [facility]  [address] [place] [address] [address]  [place] [address]  Phone: [phone]  Fax: [phone]          Patient: Devin Lopez   MR Number: [id number]   YOB: 1960   Date of Visit: 7/29/2020       Dear Dr. Marcus Weber:    Thank you for referring Devin Lopez to me for evaluation. Attached you will find relevant portions of my assessment and plan of care.    If you have questions, please do not hesitate to call me. I look forward to following Devin Lopez along with you.    Sincerely,    Bj Lucero MD    Enclosure  CC:  No Recipients    If you would like to receive this communication electronically, please contact [email or web address] or [phone] to request more information on [facility] access.    For providers and/or their staff who would like to refer a patient to [facility], please contact us through our one-stop-shop provider referral line, [facility], at [phone].    If you feel you have received this communication in error or would no longer like to receive these types of communications, please e-mail [email or web address]

## 2020-07-29 NOTE — PROGRESS NOTES
SUBJECTIVE:    Patient ID: Devin Lopez is a 60 y.o. male.    Chief Complaint: Neck Pain and Back Pain    This is a 60-year-old man referred by Dr. Weber for neck and back pain myelomalacia of the cervical spine and lumbar spinal stenosis.  Has a history of bioprosthetic valve replacement.  Denies any other chronic medical problems.  Had neck surgery 25 years ago following an accident.  Had some residual numbness and tingling in his arms and hands after that.  Presents with complaints of weakness in the arms and impaired balance.  No significant neck pain.  Complains of right-sided low back pain at the lumbosacral junction with numbness and tingling in both legs and feet.  When he walks his legs feel heavy and tired and get better when he rests or leans on something.  Denies bowel or bladder dysfunction fever chills sweats or unexpected weight loss.  Had an MRI of the cervical spine:  On 06/16/2020 which is summarized below    Impression:     In this patient with surgical change of an anterior cervical fusion from C4 through C6, there are multilevel degenerative changes within the cervical spine, as further detailed above, most significant at the 2 levels below the level of the fusion.     Regions of abnormal spinal cord signal at C6-7 and to a lesser extent C5-6, likely myelomalacia on the basis of degenerative change.      MRI of the lumbar spine done the same date is summarized below:    Impression:     In this patient with surgical change of an anterior cervical fusion from C4 through C6, there are multilevel degenerative changes within the cervical spine, as further detailed above, most significant at the 2 levels below the level of the fusion.     Regions of abnormal spinal cord signal at C6-7 and to a lesser extent C5-6, likely myelomalacia on the basis of degenerative change.    He has been evaluated by Dr. Gus Ryan, orthopedic spine surgeon in [place] who recommended surgery on the neck and  possibly the lumbar spine as well.  I do not have the benefit of his records.  Patient is here for 2nd opinion        Past Medical History:   Diagnosis Date    Hypertension     Murmur      Social History     Socioeconomic History    Marital status:      Spouse name: Not on file    Number of children: Not on file    Years of education: Not on file    Highest education level: Not on file   Occupational History    Not on file   Social Needs    Financial resource strain: Not on file    Food insecurity     Worry: Not on file     Inability: Not on file    Transportation needs     Medical: Not on file     Non-medical: Not on file   Tobacco Use    Smoking status: Former Smoker     Packs/day: 0.50     Years: 40.00     Pack years: 20.00     Types: Cigarettes     Start date: 1/15/2018    Smokeless tobacco: Never Used   Substance and Sexual Activity    Alcohol use: Yes     Alcohol/week: 14.0 standard drinks     Types: 14 Shots of liquor per week    Drug use: No    Sexual activity: Yes     Partners: Female     Comment: wife   Lifestyle    Physical activity     Days per week: Not on file     Minutes per session: Not on file    Stress: Not on file   Relationships    Social connections     Talks on phone: Not on file     Gets together: Not on file     Attends Ca[employer] service: Not on file     Active member of club or organization: Not on file     Attends meetings of clubs or organizations: Not on file     Relationship status: Not on file   Other Topics Concern    Not on file   Social History Narrative    Not on file     Past Surgical History:   Procedure Laterality Date    BENTALL PROCEDURE FOR REPLACEMENT OF AORTIC VALVE, AORTIC ROOT, AND ASCENDING AORTA N/A 4/5/2019    Procedure: BENTALL PROCEDURE;  Surgeon: Yuri Burks MD;  [facility] OR [address] [place] [address];  Service: Cardiothoracic;  Laterality: N/A;  with aortic root replacement    CATHETERIZATION OF BOTH LEFT AND RIGHT HEART Bilateral 2/8/2019     "Procedure: CATHETERIZATION, HEART, BOTH LEFT AND RIGHT;  Surgeon: Jarred Alarcon MD;  Location: [facility] CATH LAB;  Service: Cardiology;  Laterality: Bilateral;    CERVICAL SPINE SURGERY      SPINE SURGERY       Family History   Problem Relation Age of Onset    Heart disease Mother     Cancer Father     Kidney disease Father     Hypertension Father      Vitals:    07/29/20 1009   BP: 126/78   Pulse: 75   Weight: 71.8 kg (158 lb 4.6 oz)   Height: 5' 11" (1.803 m)       Review of Systems   Constitutional: Negative for chills, diaphoresis, fatigue, fever and unexpected weight change.   HENT: Negative for trouble swallowing.    Eyes: Negative for visual disturbance.   Respiratory: Negative for shortness of breath.    Cardiovascular: Negative for chest pain.   Gastrointestinal: Negative for abdominal pain, constipation, diarrhea, nausea and vomiting.   Genitourinary: Negative for difficulty urinating.   Musculoskeletal: Negative for arthralgias, back pain, gait problem, joint swelling, myalgias, neck pain and neck stiffness.   Neurological: Negative for dizziness, speech difficulty, weakness, light-headedness, numbness and headaches.          Objective:      Physical Exam  Neurological:      Mental Status: He is alert and oriented to person, place, and time.      Comments: He is awake and in no acute distress  No point tenderness external lesions or palpable masses about the cervical or lumbar spine  Lumbar range of motion is normal and painless  Gait is a bit unsteady but I do not find him particularly spastic.  He is able to toe-walk but not heel walk due to imbalance  Deep tendon reflexes are +2 in both upper extremities +3 at both knees and +1 at both ankles.  Kehinde sign is positive on the left and negative on the right  Babinski sign is negative bilaterally  There is no clonus  Strength is normal in both upper and lower extremities             Assessment:       1. Cervical myelopathy    2. Myelomalacia of " cervical cord    3. Neurogenic claudication           Plan:     he is clinically myelopathic.  I agree with the recommendations to proceed with decompression of the cervical spine.  Likewise he has neurogenic claudication on the basis of multilevel lumbar spinal stenosis.  That will likely need to be addressed surgically as well.  I defer further treatment to .  He can follow up here on an as-needed basis      Cervical myelopathy    Myelomalacia of cervical cord  -     Ambulatory referral/consult to [facility]    Neurogenic claudication

## 2020-08-06 ENCOUNTER — HOSPITAL ENCOUNTER (OUTPATIENT)
Dept: PREADMISSION TESTING | Facility: OTHER | Age: 60
Discharge: HOME | End: 2020-08-06
Attending: ORTHOPAEDIC SURGERY
Payer: COMMERCIAL

## 2020-08-06 ENCOUNTER — ANESTHESIA EVENT (OUTPATIENT)
Dept: SURGERY | Facility: OTHER | Age: 60
DRG: 472 | End: 2020-08-06
Payer: COMMERCIAL

## 2020-08-06 ENCOUNTER — HOSPITAL ENCOUNTER (OUTPATIENT)
Dept: RADIOLOGY | Facility: OTHER | Age: 60
Discharge: HOME | End: 2020-08-06
Attending: INTERNAL MEDICINE
Payer: COMMERCIAL

## 2020-08-06 VITALS
TEMPERATURE: 97 F | WEIGHT: 158 LBS | SYSTOLIC BLOOD PRESSURE: 166 MMHG | OXYGEN SATURATION: 99 % | BODY MASS INDEX: 22.12 KG/M2 | HEIGHT: 71 IN | HEART RATE: 74 BPM | DIASTOLIC BLOOD PRESSURE: 80 MMHG

## 2020-08-06 DIAGNOSIS — M50.30 DEGENERATION OF CERVICAL INTERVERTEBRAL DISC: Primary | ICD-10-CM

## 2020-08-06 DIAGNOSIS — Z01.811 PRE-OP CHEST EXAM: ICD-10-CM

## 2020-08-06 LAB
ABO + RH BLD: NORMAL
ANION GAP SERPL CALC-SCNC: 9 MMOL/L (ref 8–16)
APTT BLDCRRT: 27.7 SEC (ref 21–32)
BASOPHILS # BLD AUTO: 0.02 K/UL (ref 0–0.2)
BASOPHILS NFR BLD: 0.3 % (ref 0–1.9)
BILIRUB UR QL STRIP: NEGATIVE
BLD GP AB SCN CELLS X3 SERPL QL: NORMAL
BLOOD GROUP ANTIBODIES SERPL: NORMAL
BUN SERPL-MCNC: 13 MG/DL (ref 8–23)
CALCIUM SERPL-MCNC: 9.4 MG/DL (ref 8.7–10.5)
CHLORIDE SERPL-SCNC: 104 MMOL/L (ref 95–110)
CLARITY UR: CLEAR
CO2 SERPL-SCNC: 26 MMOL/L (ref 23–29)
COLOR UR: YELLOW
CREAT SERPL-MCNC: 0.9 MG/DL (ref 0.5–1.4)
DIFFERENTIAL METHOD: ABNORMAL
EOSINOPHIL # BLD AUTO: 0 K/UL (ref 0–0.5)
EOSINOPHIL NFR BLD: 0.3 % (ref 0–8)
ERYTHROCYTE [DISTWIDTH] IN BLOOD BY AUTOMATED COUNT: 13 % (ref 11.5–14.5)
EST. GFR  (AFRICAN AMERICAN): >60 ML/MIN/1.73 M^2
EST. GFR  (NON AFRICAN AMERICAN): >60 ML/MIN/1.73 M^2
GLUCOSE SERPL-MCNC: 71 MG/DL (ref 70–110)
GLUCOSE UR QL STRIP: NEGATIVE
HCT VFR BLD AUTO: 39.6 % (ref 40–54)
HGB BLD-MCNC: 13.4 G/DL (ref 14–18)
HGB UR QL STRIP: NEGATIVE
IMM GRANULOCYTES # BLD AUTO: 0.01 K/UL (ref 0–0.04)
IMM GRANULOCYTES NFR BLD AUTO: 0.2 % (ref 0–0.5)
INR PPP: 1 (ref 0.8–1.2)
KETONES UR QL STRIP: NEGATIVE
LEUKOCYTE ESTERASE UR QL STRIP: NEGATIVE
LYMPHOCYTES # BLD AUTO: 2 K/UL (ref 1–4.8)
LYMPHOCYTES NFR BLD: 33.5 % (ref 18–48)
MCH RBC QN AUTO: 33.3 PG (ref 27–31)
MCHC RBC AUTO-ENTMCNC: 33.8 G/DL (ref 32–36)
MCV RBC AUTO: 99 FL (ref 82–98)
MONOCYTES # BLD AUTO: 0.7 K/UL (ref 0.3–1)
MONOCYTES NFR BLD: 11.3 % (ref 4–15)
NEUTROPHILS # BLD AUTO: 3.3 K/UL (ref 1.8–7.7)
NEUTROPHILS NFR BLD: 54.4 % (ref 38–73)
NITRITE UR QL STRIP: NEGATIVE
NRBC BLD-RTO: 0 /100 WBC
PH UR STRIP: 6 [PH] (ref 5–8)
PLATELET # BLD AUTO: 94 K/UL (ref 150–350)
PMV BLD AUTO: 10.6 FL (ref 9.2–12.9)
POTASSIUM SERPL-SCNC: 4.9 MMOL/L (ref 3.5–5.1)
PROT UR QL STRIP: NEGATIVE
PROTHROMBIN TIME: 10.9 SEC (ref 9–12.5)
RBC # BLD AUTO: 4.02 M/UL (ref 4.6–6.2)
SODIUM SERPL-SCNC: 139 MMOL/L (ref 136–145)
SP GR UR STRIP: 1.02 (ref 1–1.03)
URN SPEC COLLECT METH UR: NORMAL
UROBILINOGEN UR STRIP-ACNC: NEGATIVE EU/DL
WBC # BLD AUTO: 6.16 K/UL (ref 3.9–12.7)

## 2020-08-06 PROCEDURE — 81003 URINALYSIS AUTO W/O SCOPE: CPT

## 2020-08-06 PROCEDURE — 93010 ELECTROCARDIOGRAM REPORT: CPT | Mod: ,,, | Performed by: INTERNAL MEDICINE

## 2020-08-06 PROCEDURE — 80048 BASIC METABOLIC PNL TOTAL CA: CPT

## 2020-08-06 PROCEDURE — 71046 X-RAY EXAM CHEST 2 VIEWS: CPT | Mod: TC

## 2020-08-06 PROCEDURE — 85610 PROTHROMBIN TIME: CPT

## 2020-08-06 PROCEDURE — 85730 THROMBOPLASTIN TIME PARTIAL: CPT

## 2020-08-06 PROCEDURE — 71046 XR CHEST PA AND LATERAL PRE-OP: ICD-10-PCS | Mod: 26,,, | Performed by: RADIOLOGY

## 2020-08-06 PROCEDURE — 85025 COMPLETE CBC W/AUTO DIFF WBC: CPT

## 2020-08-06 PROCEDURE — 36415 COLL VENOUS BLD VENIPUNCTURE: CPT

## 2020-08-06 PROCEDURE — 93010 EKG 12-LEAD: ICD-10-PCS | Mod: ,,, | Performed by: INTERNAL MEDICINE

## 2020-08-06 PROCEDURE — 93005 ELECTROCARDIOGRAM TRACING: CPT

## 2020-08-06 PROCEDURE — 86901 BLOOD TYPING SEROLOGIC RH(D): CPT

## 2020-08-06 PROCEDURE — 86870 RBC ANTIBODY IDENTIFICATION: CPT

## 2020-08-06 PROCEDURE — 71046 X-RAY EXAM CHEST 2 VIEWS: CPT | Mod: 26,,, | Performed by: RADIOLOGY

## 2020-08-06 RX ORDER — LIDOCAINE HYDROCHLORIDE 10 MG/ML
0.5 INJECTION, SOLUTION EPIDURAL; INFILTRATION; INTRACAUDAL; PERINEURAL ONCE
Status: CANCELLED | OUTPATIENT
Start: 2020-08-06 | End: 2020-08-06

## 2020-08-06 RX ORDER — SODIUM CHLORIDE, SODIUM LACTATE, POTASSIUM CHLORIDE, CALCIUM CHLORIDE 600; 310; 30; 20 MG/100ML; MG/100ML; MG/100ML; MG/100ML
INJECTION, SOLUTION INTRAVENOUS CONTINUOUS
Status: CANCELLED | OUTPATIENT
Start: 2020-08-06

## 2020-08-06 RX ORDER — ACETAMINOPHEN 500 MG
1000 TABLET ORAL
Status: CANCELLED | OUTPATIENT
Start: 2020-08-06 | End: 2020-08-06

## 2020-08-06 RX ORDER — PREGABALIN 75 MG/1
75 CAPSULE ORAL ONCE
Status: CANCELLED | OUTPATIENT
Start: 2020-08-06 | End: 2020-08-06

## 2020-08-06 NOTE — ANESTHESIA PREPROCEDURE EVALUATION
08/06/2020  Devin Lopez is a 60 y.o., male.    Anesthesia Evaluation    I have reviewed the Patient Summary Reports.    I have reviewed the Nursing Notes. I have reviewed the NPO Status.   I have reviewed the Medications.     Review of Systems  Anesthesia Hx:  History of prior surgery of interest to airway management or planning: Previous anesthesia: General 4/19 Bentall procedure with general anesthesia.  Airway issues documented on chart review include mask, easy, GETA, easy direct laryngoscopy , view on direct laryngoscopy Grade II  Denies Family Hx of Anesthesia complications.   Denies Personal Hx of Anesthesia complications.   Social:  Former Smoker, Alcohol Use 1/18 quit   Hematology/Oncology:  Hematology Normal   Oncology Normal     EENT/Dental:EENT/Dental Normal   Cardiovascular:   Hypertension Valvular problems/Murmurs (s/p AVR, ascending AoAneurysm repair due to AI) CHF (compensated) Echo 10/19  · Mild concentric left ventricular hypertrophy.  · The estimated PA systolic pressure is 16 mm Hg  · Mildly decreased left ventricular systolic function. The estimated ejection fraction is 45%  · Grade II (moderate) left ventricular diastolic dysfunction consistent with pseudonormalization.  · Normal right ventricular systolic function.  · Mild left atrial enlargement.  · There is a porcine bioprosthetic aortic valve present. There is no aortic insufficiency present.    Clearance from Dr. Alarcon on paper chart   Pulmonary:  Pulmonary Normal    Renal/:  Renal/ Normal     Hepatic/GI:  Hepatic/GI Normal    Musculoskeletal:   S/p ACF  Has numbness in both hands and feet  Low back pain Spine Disorders: lumbar and cervical    Neurological:  Neurology Normal    Endocrine:  Endocrine Normal    Dermatological:  Skin Normal    Psych:  Psychiatric Normal           Physical Exam  General:  Well nourished     Airway/Jaw/Neck:  Airway Findings: Mouth Opening: Normal Tongue: Normal  General Airway Assessment: Adult  Mallampati: II  TM Distance: Normal, at least 6 cm  Jaw/Neck Findings:     Neck ROM: Extension Decreased, Mod., Extension Painful      Dental:  Dental Findings: Periodontal disease, Mild         Mental Status:  Mental Status Findings:  Cooperative, Alert and Oriented         Anesthesia Plan  Type of Anesthesia, risks & benefits discussed:  Anesthesia Type:  general  Patient's Preference:   Intra-op Monitoring Plan: standard ASA monitors  Intra-op Monitoring Plan Comments:   Post Op Pain Control Plan: per primary service following discharge from PACU and multimodal analgesia  Post Op Pain Control Plan Comments:   Induction:   IV  Beta Blocker:         Informed Consent: Patient understands risks and agrees with Anesthesia plan.  Questions answered. Anesthesia consent signed with patient.  ASA Score: 3     Day of Surgery Review of History & Physical:    H&P update referred to the surgeon.     Anesthesia Plan Notes: Labs/EKG today need review    Possible art line        Ready For Surgery From Anesthesia Perspective.

## 2020-08-06 NOTE — DISCHARGE INSTRUCTIONS
Information to Prepare you for your Surgery    PRE-ADMIT TESTING -  [phone]    [address]  [facility]          Your surgery has been scheduled at [facility]. We are pleased to have the opportunity to serve you. For Further Information please call [phone].    On the day of surgery please report to the Information Desk on the 1st floor.    · CONTACT YOUR PHYSICIAN'S OFFICE THE DAY PRIOR TO YOUR SURGERY TO OBTAIN YOUR ARRIVAL TIME.     · The evening before surgery do not eat anything after 9 p.m. ( this includes hard candy, chewing gum and mints).  You may only have GATORADE, POWERADE AND WATER  from 9 p.m. until you leave your home.   DO NOT DRINK ANY LIQUIDS ON THE WAY TO THE HOSPITAL.      SPECIAL MEDICATION INSTRUCTIONS: TAKE medications checked off by the Anesthesiologist on your Medication List.    Angiogram Patients: Take medications as instructed by your physician, including aspirin.     Surgery Patients:    If you take ASPIRIN - Your PHYSICIAN/SURGEON will need to inform you IF/OR when you need to stop taking aspirin prior to your surgery.     Do Not take any medications containing IBUPROFEN.  Do Not Wear any make-up or dark nail polish   (especially eye make-up) to surgery. If you come to surgery with makeup on you will be required to remove the makeup or nail polish.    Do not shave your surgical area at least 5 days prior to your surgery. The surgical prep will be performed at the hospital according to Infection Control regulations.    Leave all valuables at home.   Do Not wear any jewelry or watches, including any metal in body piercings. Jewelry must be removed prior to coming to the hospital.  There is a possibility that rings that are unable to be removed may be cut off if they are on the surgical extremity.    Contact Lens must be removed before surgery. Either do not wear the contact lens or bring a case and solution for  storage.  Please bring a container for eyeglasses or dentures as required.  Bring any paperwork your physician has provided, such as consent forms,  history and physicals, doctor's orders, etc.   Bring comfortable clothes that are loose fitting to wear upon discharge. Take into consideration the type of surgery being performed.  Maintain your diet as advised per your physician the day prior to surgery.      Adequate rest the night before surgery is advised.   Park in the Parking lot behind the hospital or in the [facility] across the street from the parking lot. Parking is complimentary.  If you will be discharged the same day as your procedure, please arrange for a responsible adult to drive you home or to accompany you if traveling by taxi.   YOU WILL NOT BE PERMITTED TO DRIVE OR TO LEAVE THE HOSPITAL ALONE AFTER SURGERY.   If you are being discharged the same day, it is strongly recommended that you arrange for someone to remain with you for the first 24 hrs following your surgery.    The Surgeon will speak to your family/visitor after your surgery regarding the outcome of your surgery and post op care.  The Surgeon may speak to you after your surgery, but there is a possibility you may not remember the details.  Please check with your family members regarding the conversation with the Surgeon.    We strongly recommend whoever is bringing you home be present for discharge instructions.  This will ensure a thorough understanding for your post op home care.    ALL CHILDREN MUST ALWAYS BE ACCOMPANIED BY AN ADULT.    Visitors-Refer to current Visitor policy handouts.    Thank you for your cooperation.  The Staff of [facility].                Bathing Instructions with Hibiclens     Shower the evening before and morning of your procedure with Hibiclens:   Wash your face with water and your regular face wash/soap   Apply Hibiclens directly on your skin or on a wet washcloth and wash  gently. When showering: Move away from the shower stream when applying Hibiclens to avoid rinsing off too soon.   Rinse thoroughly with warm water   Do not dilute Hibiclens         Dry off as usual, do not use any deodorant, powder, body lotions, perfume, after shave or cologne.

## 2020-08-08 ENCOUNTER — CLINICAL SUPPORT (OUTPATIENT)
Dept: URGENT CARE | Facility: CLINIC | Age: 60
DRG: 472 | End: 2020-08-08
Payer: COMMERCIAL

## 2020-08-08 VITALS — TEMPERATURE: 97 F

## 2020-08-08 DIAGNOSIS — Z01.818 PREOP TESTING: ICD-10-CM

## 2020-08-08 PROCEDURE — U0003 INFECTIOUS AGENT DETECTION BY NUCLEIC ACID (DNA OR RNA); SEVERE ACUTE RESPIRATORY SYNDROME CORONAVIRUS 2 (SARS-COV-2) (CORONAVIRUS DISEASE [COVID-19]), AMPLIFIED PROBE TECHNIQUE, MAKING USE OF HIGH THROUGHPUT TECHNOLOGIES AS DESCRIBED BY CMS-2020-01-R: HCPCS

## 2020-08-09 LAB — SARS-COV-2 RNA RESP QL NAA+PROBE: NOT DETECTED

## 2020-08-11 ENCOUNTER — HOSPITAL ENCOUNTER (INPATIENT)
Facility: OTHER | Age: 60
LOS: 3 days | Discharge: HOME | DRG: 472 | End: 2020-08-14
Attending: ORTHOPAEDIC SURGERY | Admitting: ORTHOPAEDIC SURGERY
Payer: COMMERCIAL

## 2020-08-11 ENCOUNTER — ANESTHESIA (OUTPATIENT)
Dept: SURGERY | Facility: OTHER | Age: 60
DRG: 472 | End: 2020-08-11
Payer: COMMERCIAL

## 2020-08-11 DIAGNOSIS — M50.30 DEGENERATION OF CERVICAL INTERVERTEBRAL DISC: ICD-10-CM

## 2020-08-11 DIAGNOSIS — M50.023 CERVICAL DISC DISORDER AT C6-C7 LEVEL WITH MYELOPATHY: Primary | ICD-10-CM

## 2020-08-11 DIAGNOSIS — Z01.818 PREOP TESTING: ICD-10-CM

## 2020-08-11 DIAGNOSIS — M48.07 LUMBOSACRAL STENOSIS: ICD-10-CM

## 2020-08-11 PROBLEM — M48.02 SPINAL STENOSIS IN CERVICAL REGION: Status: ACTIVE | Noted: 2020-08-11

## 2020-08-11 LAB
ANION GAP SERPL CALC-SCNC: 11 MMOL/L (ref 8–16)
ANION GAP SERPL CALC-SCNC: 8 MMOL/L (ref 8–16)
BASOPHILS # BLD AUTO: 0 K/UL (ref 0–0.2)
BASOPHILS # BLD AUTO: 0 K/UL (ref 0–0.2)
BASOPHILS NFR BLD: 0 % (ref 0–1.9)
BASOPHILS NFR BLD: 0 % (ref 0–1.9)
BUN SERPL-MCNC: 11 MG/DL (ref 8–23)
BUN SERPL-MCNC: 14 MG/DL (ref 8–23)
CALCIUM SERPL-MCNC: 8.3 MG/DL (ref 8.7–10.5)
CALCIUM SERPL-MCNC: 8.4 MG/DL (ref 8.7–10.5)
CHLORIDE SERPL-SCNC: 105 MMOL/L (ref 95–110)
CHLORIDE SERPL-SCNC: 107 MMOL/L (ref 95–110)
CO2 SERPL-SCNC: 21 MMOL/L (ref 23–29)
CO2 SERPL-SCNC: 23 MMOL/L (ref 23–29)
CREAT SERPL-MCNC: 0.8 MG/DL (ref 0.5–1.4)
CREAT SERPL-MCNC: 0.8 MG/DL (ref 0.5–1.4)
DIFFERENTIAL METHOD: ABNORMAL
DIFFERENTIAL METHOD: ABNORMAL
EOSINOPHIL # BLD AUTO: 0 K/UL (ref 0–0.5)
EOSINOPHIL # BLD AUTO: 0 K/UL (ref 0–0.5)
EOSINOPHIL NFR BLD: 0 % (ref 0–8)
EOSINOPHIL NFR BLD: 0 % (ref 0–8)
ERYTHROCYTE [DISTWIDTH] IN BLOOD BY AUTOMATED COUNT: 13.2 % (ref 11.5–14.5)
ERYTHROCYTE [DISTWIDTH] IN BLOOD BY AUTOMATED COUNT: 13.3 % (ref 11.5–14.5)
EST. GFR  (AFRICAN AMERICAN): >60 ML/MIN/1.73 M^2
EST. GFR  (AFRICAN AMERICAN): >60 ML/MIN/1.73 M^2
EST. GFR  (NON AFRICAN AMERICAN): >60 ML/MIN/1.73 M^2
EST. GFR  (NON AFRICAN AMERICAN): >60 ML/MIN/1.73 M^2
GLUCOSE SERPL-MCNC: 123 MG/DL (ref 70–110)
GLUCOSE SERPL-MCNC: 159 MG/DL (ref 70–110)
HCT VFR BLD AUTO: 26.1 % (ref 40–54)
HCT VFR BLD AUTO: 28.2 % (ref 40–54)
HGB BLD-MCNC: 9 G/DL (ref 14–18)
HGB BLD-MCNC: 9.5 G/DL (ref 14–18)
IMM GRANULOCYTES # BLD AUTO: 0.01 K/UL (ref 0–0.04)
IMM GRANULOCYTES # BLD AUTO: 0.02 K/UL (ref 0–0.04)
IMM GRANULOCYTES NFR BLD AUTO: 0.2 % (ref 0–0.5)
IMM GRANULOCYTES NFR BLD AUTO: 0.3 % (ref 0–0.5)
LYMPHOCYTES # BLD AUTO: 0.4 K/UL (ref 1–4.8)
LYMPHOCYTES # BLD AUTO: 0.6 K/UL (ref 1–4.8)
LYMPHOCYTES NFR BLD: 10.2 % (ref 18–48)
LYMPHOCYTES NFR BLD: 9.1 % (ref 18–48)
MCH RBC QN AUTO: 32.9 PG (ref 27–31)
MCH RBC QN AUTO: 33.1 PG (ref 27–31)
MCHC RBC AUTO-ENTMCNC: 33.7 G/DL (ref 32–36)
MCHC RBC AUTO-ENTMCNC: 34.5 G/DL (ref 32–36)
MCV RBC AUTO: 96 FL (ref 82–98)
MCV RBC AUTO: 98 FL (ref 82–98)
MONOCYTES # BLD AUTO: 0.1 K/UL (ref 0.3–1)
MONOCYTES # BLD AUTO: 0.3 K/UL (ref 0.3–1)
MONOCYTES NFR BLD: 1.5 % (ref 4–15)
MONOCYTES NFR BLD: 5.1 % (ref 4–15)
NEUTROPHILS # BLD AUTO: 3.5 K/UL (ref 1.8–7.7)
NEUTROPHILS # BLD AUTO: 5.7 K/UL (ref 1.8–7.7)
NEUTROPHILS NFR BLD: 85.5 % (ref 38–73)
NEUTROPHILS NFR BLD: 88.1 % (ref 38–73)
NRBC BLD-RTO: 0 /100 WBC
NRBC BLD-RTO: 0 /100 WBC
PLATELET # BLD AUTO: 63 K/UL (ref 150–350)
PLATELET # BLD AUTO: 75 K/UL (ref 150–350)
PMV BLD AUTO: 10.5 FL (ref 9.2–12.9)
PMV BLD AUTO: 9.6 FL (ref 9.2–12.9)
POTASSIUM SERPL-SCNC: 3.7 MMOL/L (ref 3.5–5.1)
POTASSIUM SERPL-SCNC: 4.3 MMOL/L (ref 3.5–5.1)
RBC # BLD AUTO: 2.72 M/UL (ref 4.6–6.2)
RBC # BLD AUTO: 2.89 M/UL (ref 4.6–6.2)
SODIUM SERPL-SCNC: 136 MMOL/L (ref 136–145)
SODIUM SERPL-SCNC: 139 MMOL/L (ref 136–145)
WBC # BLD AUTO: 4.01 K/UL (ref 3.9–12.7)
WBC # BLD AUTO: 6.68 K/UL (ref 3.9–12.7)

## 2020-08-11 PROCEDURE — 20000000 HC ICU ROOM

## 2020-08-11 PROCEDURE — 25000003 PHARM REV CODE 250: Performed by: ANESTHESIOLOGY

## 2020-08-11 PROCEDURE — 63600175 PHARM REV CODE 636 W HCPCS: Performed by: ANESTHESIOLOGY

## 2020-08-11 PROCEDURE — 97530 THERAPEUTIC ACTIVITIES: CPT

## 2020-08-11 PROCEDURE — 27800903 OPTIME MED/SURG SUP & DEVICES OTHER IMPLANTS: Performed by: ORTHOPAEDIC SURGERY

## 2020-08-11 PROCEDURE — 36000711: Performed by: ORTHOPAEDIC SURGERY

## 2020-08-11 PROCEDURE — 80048 BASIC METABOLIC PNL TOTAL CA: CPT | Mod: 91

## 2020-08-11 PROCEDURE — 36000710: Performed by: ORTHOPAEDIC SURGERY

## 2020-08-11 PROCEDURE — 25000003 PHARM REV CODE 250: Performed by: SPECIALIST

## 2020-08-11 PROCEDURE — 94761 N-INVAS EAR/PLS OXIMETRY MLT: CPT

## 2020-08-11 PROCEDURE — 63600175 PHARM REV CODE 636 W HCPCS: Performed by: ORTHOPAEDIC SURGERY

## 2020-08-11 PROCEDURE — 85025 COMPLETE CBC W/AUTO DIFF WBC: CPT | Mod: 91

## 2020-08-11 PROCEDURE — C1713 ANCHOR/SCREW BN/BN,TIS/BN: HCPCS | Performed by: ORTHOPAEDIC SURGERY

## 2020-08-11 PROCEDURE — 97161 PT EVAL LOW COMPLEX 20 MIN: CPT

## 2020-08-11 PROCEDURE — 25000003 PHARM REV CODE 250: Performed by: ORTHOPAEDIC SURGERY

## 2020-08-11 PROCEDURE — 25000003 PHARM REV CODE 250: Performed by: HOSPITALIST

## 2020-08-11 PROCEDURE — 80048 BASIC METABOLIC PNL TOTAL CA: CPT

## 2020-08-11 PROCEDURE — 63600175 PHARM REV CODE 636 W HCPCS: Performed by: NURSE ANESTHETIST, CERTIFIED REGISTERED

## 2020-08-11 PROCEDURE — P9045 ALBUMIN (HUMAN), 5%, 250 ML: HCPCS | Mod: JG | Performed by: NURSE ANESTHETIST, CERTIFIED REGISTERED

## 2020-08-11 PROCEDURE — 36415 COLL VENOUS BLD VENIPUNCTURE: CPT

## 2020-08-11 PROCEDURE — 37000008 HC ANESTHESIA 1ST 15 MINUTES: Performed by: ORTHOPAEDIC SURGERY

## 2020-08-11 PROCEDURE — 36620 INSERTION CATHETER ARTERY: CPT | Performed by: ANESTHESIOLOGY

## 2020-08-11 PROCEDURE — 25000003 PHARM REV CODE 250: Performed by: NURSE ANESTHETIST, CERTIFIED REGISTERED

## 2020-08-11 PROCEDURE — 27201423 OPTIME MED/SURG SUP & DEVICES STERILE SUPPLY: Performed by: ORTHOPAEDIC SURGERY

## 2020-08-11 PROCEDURE — 37000009 HC ANESTHESIA EA ADD 15 MINS: Performed by: ORTHOPAEDIC SURGERY

## 2020-08-11 DEVICE — IMPLANTABLE DEVICE: Type: IMPLANTABLE DEVICE | Site: SPINE CERVICAL | Status: FUNCTIONAL

## 2020-08-11 RX ORDER — EPHEDRINE SULFATE 50 MG/ML
INJECTION, SOLUTION INTRAVENOUS
Status: DISCONTINUED | OUTPATIENT
Start: 2020-08-11 | End: 2020-08-11

## 2020-08-11 RX ORDER — ROCURONIUM BROMIDE 10 MG/ML
INJECTION, SOLUTION INTRAVENOUS
Status: DISCONTINUED | OUTPATIENT
Start: 2020-08-11 | End: 2020-08-11

## 2020-08-11 RX ORDER — ONDANSETRON 2 MG/ML
4 INJECTION INTRAMUSCULAR; INTRAVENOUS DAILY PRN
Status: DISCONTINUED | OUTPATIENT
Start: 2020-08-11 | End: 2020-08-14 | Stop reason: HOSPADM

## 2020-08-11 RX ORDER — PROPOFOL 10 MG/ML
VIAL (ML) INTRAVENOUS CONTINUOUS PRN
Status: DISCONTINUED | OUTPATIENT
Start: 2020-08-11 | End: 2020-08-11

## 2020-08-11 RX ORDER — DEXAMETHASONE SODIUM PHOSPHATE 4 MG/ML
INJECTION, SOLUTION INTRA-ARTICULAR; INTRALESIONAL; INTRAMUSCULAR; INTRAVENOUS; SOFT TISSUE
Status: DISCONTINUED | OUTPATIENT
Start: 2020-08-11 | End: 2020-08-11

## 2020-08-11 RX ORDER — SODIUM CHLORIDE, SODIUM LACTATE, POTASSIUM CHLORIDE, CALCIUM CHLORIDE 600; 310; 30; 20 MG/100ML; MG/100ML; MG/100ML; MG/100ML
INJECTION, SOLUTION INTRAVENOUS CONTINUOUS
Status: DISCONTINUED | OUTPATIENT
Start: 2020-08-11 | End: 2020-08-11

## 2020-08-11 RX ORDER — ONDANSETRON 8 MG/1
8 TABLET, ORALLY DISINTEGRATING ORAL EVERY 8 HOURS PRN
Status: DISCONTINUED | OUTPATIENT
Start: 2020-08-11 | End: 2020-08-14 | Stop reason: HOSPADM

## 2020-08-11 RX ORDER — LOPERAMIDE HYDROCHLORIDE 2 MG/1
4 CAPSULE ORAL ONCE
Status: DISCONTINUED | OUTPATIENT
Start: 2020-08-11 | End: 2020-08-14 | Stop reason: HOSPADM

## 2020-08-11 RX ORDER — HYDROMORPHONE HYDROCHLORIDE 2 MG/ML
0.5 INJECTION, SOLUTION INTRAMUSCULAR; INTRAVENOUS; SUBCUTANEOUS EVERY 6 HOURS PRN
Status: DISCONTINUED | OUTPATIENT
Start: 2020-08-11 | End: 2020-08-14 | Stop reason: HOSPADM

## 2020-08-11 RX ORDER — OXYCODONE HYDROCHLORIDE 5 MG/1
5 TABLET ORAL EVERY 6 HOURS PRN
Status: CANCELLED | OUTPATIENT
Start: 2020-08-11

## 2020-08-11 RX ORDER — ONDANSETRON 2 MG/ML
INJECTION INTRAMUSCULAR; INTRAVENOUS
Status: DISCONTINUED | OUTPATIENT
Start: 2020-08-11 | End: 2020-08-11

## 2020-08-11 RX ORDER — VANCOMYCIN HYDROCHLORIDE 1 G/20ML
INJECTION, POWDER, LYOPHILIZED, FOR SOLUTION INTRAVENOUS
Status: DISCONTINUED | OUTPATIENT
Start: 2020-08-11 | End: 2020-08-11 | Stop reason: HOSPADM

## 2020-08-11 RX ORDER — MUPIROCIN 20 MG/G
OINTMENT TOPICAL 2 TIMES DAILY
Status: DISCONTINUED | OUTPATIENT
Start: 2020-08-11 | End: 2020-08-14 | Stop reason: HOSPADM

## 2020-08-11 RX ORDER — PROPOFOL 10 MG/ML
VIAL (ML) INTRAVENOUS
Status: DISCONTINUED | OUTPATIENT
Start: 2020-08-11 | End: 2020-08-11

## 2020-08-11 RX ORDER — CEFAZOLIN SODIUM 1 G/3ML
2 INJECTION, POWDER, FOR SOLUTION INTRAMUSCULAR; INTRAVENOUS
Status: COMPLETED | OUTPATIENT
Start: 2020-08-11 | End: 2020-08-11

## 2020-08-11 RX ORDER — POLYETHYLENE GLYCOL 3350 17 G/17G
17 POWDER, FOR SOLUTION ORAL DAILY
Status: DISCONTINUED | OUTPATIENT
Start: 2020-08-11 | End: 2020-08-14 | Stop reason: HOSPADM

## 2020-08-11 RX ORDER — LOSARTAN POTASSIUM 50 MG/1
50 TABLET ORAL DAILY
Status: DISCONTINUED | OUTPATIENT
Start: 2020-08-11 | End: 2020-08-12

## 2020-08-11 RX ORDER — OXYCODONE HYDROCHLORIDE 5 MG/1
5 TABLET ORAL EVERY 6 HOURS PRN
Status: DISCONTINUED | OUTPATIENT
Start: 2020-08-11 | End: 2020-08-11

## 2020-08-11 RX ORDER — DIPHENHYDRAMINE HYDROCHLORIDE 50 MG/ML
25 INJECTION INTRAMUSCULAR; INTRAVENOUS EVERY 6 HOURS PRN
Status: DISCONTINUED | OUTPATIENT
Start: 2020-08-11 | End: 2020-08-14 | Stop reason: HOSPADM

## 2020-08-11 RX ORDER — GLYCOPYRROLATE 0.2 MG/ML
INJECTION INTRAMUSCULAR; INTRAVENOUS
Status: DISCONTINUED | OUTPATIENT
Start: 2020-08-11 | End: 2020-08-11

## 2020-08-11 RX ORDER — MEPERIDINE HYDROCHLORIDE 25 MG/ML
12.5 INJECTION INTRAMUSCULAR; INTRAVENOUS; SUBCUTANEOUS ONCE AS NEEDED
Status: ACTIVE | OUTPATIENT
Start: 2020-08-11 | End: 2020-08-12

## 2020-08-11 RX ORDER — MIDAZOLAM HYDROCHLORIDE 1 MG/ML
INJECTION INTRAMUSCULAR; INTRAVENOUS
Status: DISCONTINUED | OUTPATIENT
Start: 2020-08-11 | End: 2020-08-11

## 2020-08-11 RX ORDER — SODIUM CHLORIDE 0.9 % (FLUSH) 0.9 %
3 SYRINGE (ML) INJECTION
Status: DISCONTINUED | OUTPATIENT
Start: 2020-08-11 | End: 2020-08-11

## 2020-08-11 RX ORDER — OXYCODONE HYDROCHLORIDE 5 MG/1
5 TABLET ORAL
Status: DISCONTINUED | OUTPATIENT
Start: 2020-08-11 | End: 2020-08-11

## 2020-08-11 RX ORDER — LIDOCAINE HYDROCHLORIDE 10 MG/ML
0.5 INJECTION, SOLUTION EPIDURAL; INFILTRATION; INTRACAUDAL; PERINEURAL ONCE
Status: DISCONTINUED | OUTPATIENT
Start: 2020-08-11 | End: 2020-08-11 | Stop reason: HOSPADM

## 2020-08-11 RX ORDER — HYDROMORPHONE HYDROCHLORIDE 2 MG/ML
0.4 INJECTION, SOLUTION INTRAMUSCULAR; INTRAVENOUS; SUBCUTANEOUS EVERY 5 MIN PRN
Status: DISCONTINUED | OUTPATIENT
Start: 2020-08-11 | End: 2020-08-11

## 2020-08-11 RX ORDER — HYDROMORPHONE HYDROCHLORIDE 2 MG/ML
INJECTION, SOLUTION INTRAMUSCULAR; INTRAVENOUS; SUBCUTANEOUS
Status: DISCONTINUED | OUTPATIENT
Start: 2020-08-11 | End: 2020-08-11

## 2020-08-11 RX ORDER — SODIUM CHLORIDE 0.9 % (FLUSH) 0.9 %
10 SYRINGE (ML) INJECTION
Status: DISCONTINUED | OUTPATIENT
Start: 2020-08-11 | End: 2020-08-14 | Stop reason: HOSPADM

## 2020-08-11 RX ORDER — ACETAMINOPHEN 500 MG
1000 TABLET ORAL
Status: COMPLETED | OUTPATIENT
Start: 2020-08-11 | End: 2020-08-11

## 2020-08-11 RX ORDER — ALBUMIN HUMAN 50 G/1000ML
SOLUTION INTRAVENOUS CONTINUOUS PRN
Status: DISCONTINUED | OUTPATIENT
Start: 2020-08-11 | End: 2020-08-11

## 2020-08-11 RX ORDER — PHENYLEPHRINE HYDROCHLORIDE 10 MG/ML
INJECTION INTRAVENOUS
Status: DISCONTINUED | OUTPATIENT
Start: 2020-08-11 | End: 2020-08-11

## 2020-08-11 RX ORDER — OXYCODONE HYDROCHLORIDE 5 MG/1
5 TABLET ORAL EVERY 6 HOURS PRN
Status: DISCONTINUED | OUTPATIENT
Start: 2020-08-11 | End: 2020-08-14 | Stop reason: HOSPADM

## 2020-08-11 RX ORDER — PREGABALIN 75 MG/1
75 CAPSULE ORAL ONCE
Status: COMPLETED | OUTPATIENT
Start: 2020-08-11 | End: 2020-08-11

## 2020-08-11 RX ORDER — SODIUM CHLORIDE, SODIUM LACTATE, POTASSIUM CHLORIDE, CALCIUM CHLORIDE 600; 310; 30; 20 MG/100ML; MG/100ML; MG/100ML; MG/100ML
INJECTION, SOLUTION INTRAVENOUS CONTINUOUS
Status: DISCONTINUED | OUTPATIENT
Start: 2020-08-11 | End: 2020-08-14 | Stop reason: HOSPADM

## 2020-08-11 RX ORDER — LIDOCAINE HCL/PF 100 MG/5ML
SYRINGE (ML) INTRAVENOUS
Status: DISCONTINUED | OUTPATIENT
Start: 2020-08-11 | End: 2020-08-11

## 2020-08-11 RX ORDER — CEFAZOLIN SODIUM 1 G/50ML
2 SOLUTION INTRAVENOUS
Status: COMPLETED | OUTPATIENT
Start: 2020-08-11 | End: 2020-08-11

## 2020-08-11 RX ORDER — AMOXICILLIN 250 MG
1 CAPSULE ORAL 2 TIMES DAILY
Status: DISCONTINUED | OUTPATIENT
Start: 2020-08-11 | End: 2020-08-14 | Stop reason: HOSPADM

## 2020-08-11 RX ORDER — ATORVASTATIN CALCIUM 20 MG/1
40 TABLET, FILM COATED ORAL DAILY
Status: DISCONTINUED | OUTPATIENT
Start: 2020-08-11 | End: 2020-08-14 | Stop reason: HOSPADM

## 2020-08-11 RX ORDER — PANTOPRAZOLE SODIUM 40 MG/1
40 TABLET, DELAYED RELEASE ORAL DAILY
Status: DISCONTINUED | OUTPATIENT
Start: 2020-08-11 | End: 2020-08-14 | Stop reason: HOSPADM

## 2020-08-11 RX ORDER — LOPERAMIDE HYDROCHLORIDE 2 MG/1
2 CAPSULE ORAL CONTINUOUS PRN
Status: DISCONTINUED | OUTPATIENT
Start: 2020-08-11 | End: 2020-08-14 | Stop reason: HOSPADM

## 2020-08-11 RX ORDER — METOPROLOL SUCCINATE 25 MG/1
25 TABLET, EXTENDED RELEASE ORAL DAILY
Status: DISCONTINUED | OUTPATIENT
Start: 2020-08-11 | End: 2020-08-12

## 2020-08-11 RX ORDER — LIDOCAINE HYDROCHLORIDE AND EPINEPHRINE 10; 10 MG/ML; UG/ML
INJECTION, SOLUTION INFILTRATION; PERINEURAL
Status: DISCONTINUED | OUTPATIENT
Start: 2020-08-11 | End: 2020-08-11 | Stop reason: HOSPADM

## 2020-08-11 RX ORDER — FENTANYL CITRATE 50 UG/ML
INJECTION, SOLUTION INTRAMUSCULAR; INTRAVENOUS
Status: DISCONTINUED | OUTPATIENT
Start: 2020-08-11 | End: 2020-08-11

## 2020-08-11 RX ADMIN — PREGABALIN 75 MG: 75 CAPSULE ORAL at 05:08

## 2020-08-11 RX ADMIN — DEXAMETHASONE SODIUM PHOSPHATE 8 MG: 4 INJECTION, SOLUTION INTRAMUSCULAR; INTRAVENOUS at 07:08

## 2020-08-11 RX ADMIN — SODIUM CHLORIDE, SODIUM LACTATE, POTASSIUM CHLORIDE, AND CALCIUM CHLORIDE: .6; .31; .03; .02 INJECTION, SOLUTION INTRAVENOUS at 02:08

## 2020-08-11 RX ADMIN — MUPIROCIN: 20 OINTMENT TOPICAL at 09:08

## 2020-08-11 RX ADMIN — OXYCODONE HYDROCHLORIDE 5 MG: 5 TABLET ORAL at 07:08

## 2020-08-11 RX ADMIN — PROPOFOL 200 MG: 10 INJECTION, EMULSION INTRAVENOUS at 07:08

## 2020-08-11 RX ADMIN — PHENYLEPHRINE HYDROCHLORIDE 100 MCG: 10 INJECTION INTRAVENOUS at 07:08

## 2020-08-11 RX ADMIN — ONDANSETRON HYDROCHLORIDE 4 MG: 2 INJECTION INTRAMUSCULAR; INTRAVENOUS at 10:08

## 2020-08-11 RX ADMIN — ACETAMINOPHEN 1000 MG: 500 TABLET, FILM COATED ORAL at 05:08

## 2020-08-11 RX ADMIN — LIDOCAINE HYDROCHLORIDE 50 MG: 20 INJECTION, SOLUTION INTRAVENOUS at 07:08

## 2020-08-11 RX ADMIN — PHENYLEPHRINE HYDROCHLORIDE 100 MCG: 10 INJECTION INTRAVENOUS at 09:08

## 2020-08-11 RX ADMIN — EPHEDRINE SULFATE 10 MG: 50 INJECTION INTRAVENOUS at 09:08

## 2020-08-11 RX ADMIN — FENTANYL CITRATE 50 MCG: 50 INJECTION, SOLUTION INTRAMUSCULAR; INTRAVENOUS at 07:08

## 2020-08-11 RX ADMIN — EPHEDRINE SULFATE 10 MG: 50 INJECTION INTRAVENOUS at 10:08

## 2020-08-11 RX ADMIN — OXYCODONE 5 MG: 5 TABLET ORAL at 02:08

## 2020-08-11 RX ADMIN — SODIUM CHLORIDE, SODIUM LACTATE, POTASSIUM CHLORIDE, AND CALCIUM CHLORIDE: 600; 310; 30; 20 INJECTION, SOLUTION INTRAVENOUS at 06:08

## 2020-08-11 RX ADMIN — EPHEDRINE SULFATE 5 MG: 50 INJECTION INTRAVENOUS at 10:08

## 2020-08-11 RX ADMIN — PANTOPRAZOLE SODIUM 40 MG: 40 TABLET, DELAYED RELEASE ORAL at 02:08

## 2020-08-11 RX ADMIN — HYDROMORPHONE HYDROCHLORIDE 0.4 MG: 2 INJECTION, SOLUTION INTRAMUSCULAR; INTRAVENOUS; SUBCUTANEOUS at 10:08

## 2020-08-11 RX ADMIN — HYDROMORPHONE HYDROCHLORIDE 0.4 MG: 2 INJECTION, SOLUTION INTRAMUSCULAR; INTRAVENOUS; SUBCUTANEOUS at 09:08

## 2020-08-11 RX ADMIN — FENTANYL CITRATE 50 MCG: 50 INJECTION, SOLUTION INTRAMUSCULAR; INTRAVENOUS at 09:08

## 2020-08-11 RX ADMIN — ATORVASTATIN CALCIUM 40 MG: 20 TABLET, FILM COATED ORAL at 02:08

## 2020-08-11 RX ADMIN — LOSARTAN POTASSIUM 50 MG: 50 TABLET ORAL at 02:08

## 2020-08-11 RX ADMIN — DOCUSATE SODIUM 50 MG AND SENNOSIDES 8.6 MG 1 TABLET: 8.6; 5 TABLET, FILM COATED ORAL at 02:08

## 2020-08-11 RX ADMIN — GLYCOPYRROLATE 0.2 MG: 0.2 INJECTION, SOLUTION INTRAMUSCULAR; INTRAVENOUS at 07:08

## 2020-08-11 RX ADMIN — PHENYLEPHRINE HYDROCHLORIDE 0.3 MCG/KG/MIN: 10 INJECTION INTRAVENOUS at 07:08

## 2020-08-11 RX ADMIN — SODIUM CHLORIDE, SODIUM LACTATE, POTASSIUM CHLORIDE, AND CALCIUM CHLORIDE: 600; 310; 30; 20 INJECTION, SOLUTION INTRAVENOUS at 10:08

## 2020-08-11 RX ADMIN — CEFAZOLIN 2 G: 330 INJECTION, POWDER, FOR SOLUTION INTRAMUSCULAR; INTRAVENOUS at 11:08

## 2020-08-11 RX ADMIN — CEFAZOLIN 2 G: 330 INJECTION, POWDER, FOR SOLUTION INTRAMUSCULAR; INTRAVENOUS at 02:08

## 2020-08-11 RX ADMIN — ALBUMIN (HUMAN): 12.5 SOLUTION INTRAVENOUS at 07:08

## 2020-08-11 RX ADMIN — MUPIROCIN: 20 OINTMENT TOPICAL at 02:08

## 2020-08-11 RX ADMIN — SODIUM CHLORIDE, SODIUM LACTATE, POTASSIUM CHLORIDE, AND CALCIUM CHLORIDE: .6; .31; .03; .02 INJECTION, SOLUTION INTRAVENOUS at 11:08

## 2020-08-11 RX ADMIN — PROPOFOL 100 MCG/KG/MIN: 10 INJECTION, EMULSION INTRAVENOUS at 07:08

## 2020-08-11 RX ADMIN — PROPOFOL 50 MG: 10 INJECTION, EMULSION INTRAVENOUS at 07:08

## 2020-08-11 RX ADMIN — METOPROLOL SUCCINATE 25 MG: 25 TABLET, EXTENDED RELEASE ORAL at 02:08

## 2020-08-11 RX ADMIN — FENTANYL CITRATE 100 MCG: 50 INJECTION, SOLUTION INTRAMUSCULAR; INTRAVENOUS at 07:08

## 2020-08-11 RX ADMIN — CEFAZOLIN SODIUM 2 G: 1 SOLUTION INTRAVENOUS at 07:08

## 2020-08-11 RX ADMIN — ROCURONIUM BROMIDE 30 MG: 10 INJECTION, SOLUTION INTRAVENOUS at 07:08

## 2020-08-11 RX ADMIN — MIDAZOLAM HYDROCHLORIDE 2 MG: 1 INJECTION, SOLUTION INTRAMUSCULAR; INTRAVENOUS at 06:08

## 2020-08-11 RX ADMIN — FENTANYL CITRATE 50 MCG: 50 INJECTION, SOLUTION INTRAMUSCULAR; INTRAVENOUS at 08:08

## 2020-08-11 RX ADMIN — ALBUMIN (HUMAN): 12.5 SOLUTION INTRAVENOUS at 09:08

## 2020-08-11 NOTE — ANESTHESIA PROCEDURE NOTES
Intubation  Performed by: Yuri Banegas Jr., CRNA  Authorized by: Efrain Ni MD     Intubation:     Induction:  Intravenous    Intubated:  Postinduction    Mask Ventilation:  Easy mask    Attempts:  1    Attempted By:  CRNA    Method of Intubation:  Video laryngoscopy    Blade:  Jose 3    Laryngeal View Grade: Grade I - full view of chords      Difficult Airway Encountered?: No      Complications:  None    Airway Device:  Oral endotracheal tube    Airway Device Size:  7.5    Style/Cuff Inflation:  Cuffed (inflated to minimal occlusive pressure)    Tube secured:  23    Secured at:  The lips    Placement Verified By:  Capnometry    Complicating Factors:  Poor neck/head extension (head/neck remained inline during intubation with no manipulation required)    Findings Post-Intubation:  BS equal bilateral and atraumatic/condition of teeth unchanged

## 2020-08-11 NOTE — PLAN OF CARE
Case management consult was placed by attending MD for home health     CM shared home health template with MD and sent a secure chat about home health orders needed .

## 2020-08-11 NOTE — BRIEF OP NOTE
[facility]  General Surgery  Operative Note    SUMMARY     Date of Procedure: 8/11/2020     Procedure:   1.  Posterior cervical fusion C3-4  2.  Additional level posterior cervical fusion C4-5  3.  Additional level posterior cervical fusion C5-6  4.  Additional level posterior cervical fusion C6-7  5.  Additional posterior cervical fusion C7-T1  6.  Posterior segmental instrumentation C3 through T1  7.  Laminectomy greater than 2 vertebral segments for cervical myelopathy C3-C7  8.  Autograft same incision local bone       Surgeon(s) and Role:     * Gus Ryan MD - Primary    Assisting Surgeon: None    Pre-Operative Diagnosis: Degeneration of cervical intervertebral disc [M50.30]  Cervical disc disorder at C6-C7 level with myelopathy [M50.023]  Spinal stenosis in cervical region [M48.02]  Cervical disc disorder with myelopathy of hhovvqls-yspjvud-osuap region [M50.01]  Pseudarthrosis after fusion or arthrodesis [M96.0]      Post-Operative Diagnosis: Post-Op Diagnosis Codes:     * Degeneration of cervical intervertebral disc [M50.30]     * Cervical disc disorder at C6-C7 level with myelopathy [M50.023]     * Spinal stenosis in cervical region [M48.02]     *  * Cervical disc disorder with myelopathy of pgthnnqn-eewahgc-zgqtc region [M50.01]     * Pseudarthrosis after fusion or arthrodesis [M96.0]      Anesthesia: General    Technical Procedures Used: See body of operative note    Description of the Findings of the Procedure:  60-year-old male with previous history of anterior cervical fusion with adjacent level issues causing spinal stenosis and myelopathy.  He was subsequently offered a posterior cervical decompression and fusion from C3 through T1.  The risks and benefits of surgery were discussed prior including bleeding, infection, nerve damage, paralysis, spinal fluid leak, nonunion, adjacent level issues, residual symptoms, worsening of symptoms, reoperation, medical complication, among  others.  He understood these risks and elected to proceed.    Patient was identified in the preoperative area.  The site of his surgery was marked by the surgeon and consent form was verified.  Preoperative antibiotics were given.  SCDs and Jad hose were placed.  He was then brought to the operating room and placed under general endotracheal anesthesia.  A Vazquez catheter was placed and neurological monitoring devices were placed.  A Johnson collar was placed onto the skull and the patient was then flipped into a prone position and his head secured to the bed with the neck in neutral.  The arms were secured to the side of the ulnar nerves well-padded.  The posterior aspect of his neck was then prepped and draped in usual sterile fashion.  A time-out was performed antibiotics were verified.  A midline incision was then made and carried down to the fascia.  The posterior elements were exposed from C3 through T1.  Clamp was placed on the C4 posterior spinous process and a lateral x-ray was taken to verify the appropriate level.  The high-speed bur was used to make starting points in the medial aspect of the lateral mass from C3 through C7.  Drilling was then performed upwards and outwards from C3 through C7 12 mm in depth.  The screw holes were then tapped.  Attention was then brought to performing the laminectomy.  Bilaterally troughs were made from C3 through C7.  The posterior elements were then removed en bloc fully decompressing and widely decompressing the spinal cord.  Attention was then brought to placement of the screws.  The previous holes which had been prepared were placed with 14 mm lateral mass screws.  Foraminotomies were performed bilaterally at C7-T1 in order to palpate the T1 pedicle.  A starting point was then made with the drill.  The pedicle was then drilled to 22 mm bilaterally and palpated although floors wall were felt to be intact.  The tap was placed and the appropriate size screw was placed.   The appropriate size rods were then selected and contoured.  They were then placed and caps placed.  Final tightening was performed.  X-rays were taken all hardware was felt to be in adequate position.  The wound was then irrigated copiously.  The high-speed bur was then used to decorticate the posterior elements bilaterally.  The bone had been removed during the course of the laminectomy was then stripped of soft tissue meticulously morselized in a bone mill.  It was then mixed with the bone graft substitute and placed posterior laterally.  The canal was inspected to assure there is no bone within the canal.  A g of vancomycin powder was placed in the wound to prevent infection.  A deep drain was placed.  The wound was then closed using 1.  Vicryl suture to close the fascia and 2 0 Vicryl suture to close the skin.  A running 3 O nylon stitch was used to complete the skin closure.  A sterile dressing was then placed.  The patient then placed back into a supine position inject and the tongs removed.  General endotracheal anesthesia was then discontinued.  The patient was then brought to the intensive care unit without complication.  All needle, sponge, lap and instrument counts were correct.  There were no complications.    Neurological monitoring:  SSEP, EMG and MEPs were followed throughout the operation.  There were no changes or abnormalities noted.    Implants:  14 mm posterior cervical screws were placed from C3 through C7 and 22 mm posterior cervical screws were placed in the pedicles of T1 with 3.5 mm rods in the appropriate end caps.  10 cc of vibone bone graft substitute was used in conjunction with local bone autograft to achieve posterior fusion.    Significant Surgical Tasks Conducted by the Assistant(s), if Applicable: n/a    Complications: No    Estimated Blood Loss (EBL): 1050 mL           Implants:   Implant Name Type Inv. Item Serial No.  Lot No. LRB No. Used Action   [id number]    [id number] [employer] [id number] N/A 1 Implanted   3.5 x 22mm Posterior Cerivcal Screw      N/A 2 Implanted   3.5 x 14 mm Posteior Cervical Screw      N/A 10 Implanted   Set Screw      N/A 12 Implanted   120 mm Armin      N/A 2 Implanted       Specimens:   Specimen (12h ago, onward)    None                  Condition: Good    Disposition: ICU - extubated and stable.    Attestation: I was present and scrubbed for the entire procedure.

## 2020-08-11 NOTE — PLAN OF CARE
LMSW completed patients discharge planning assessment with the patient wife Meenakshi.     Family denies having any DME in the home. LMSW awaiting therapy recommendations for DME. CM to order. LMSW noted consult HH.        Patients PCP is correct in Epic. Patient choice pharmacy is [facility].      Family denies having written advance directives.      CM team will continue to follow.          08/11/20 1334   Discharge Assessment   Assessment Type Discharge Planning Assessment   Confirmed/corrected address and phone number on facesheet? Yes   Assessment information obtained from? Caregiver   Communicated expected length of stay with patient/caregiver no   Prior to hospitilization cognitive status: Alert/Oriented   Prior to hospitalization functional status: Independent   Current cognitive status: Alert/Oriented   Current Functional Status: Assistive Equipment   Lives With spouse   Able to Return to Prior Arrangements yes   Is patient able to care for self after discharge? Yes   Patient's perception of discharge disposition home health   Readmission Within the Last 30 Days no previous admission in last 30 days   Patient currently being followed by outpatient case management? No   Patient currently receives any other outside agency services? No   Equipment Currently Used at Home none   Do you have any problems affording any of your prescribed medications? No   Is the patient taking medications as prescribed? yes   Does the patient have transportation home? Yes   Transportation Anticipated family or friend will provide   Does the patient receive services at the [facility]madin Clinic? No   Discharge Plan A Home Health   DME Needed Upon Discharge  other (see comments)   Patient/Family in Agreement with Plan yes

## 2020-08-11 NOTE — CONSULTS
[facility]  Consult    History of Present Illness: Mr Lopez is a 60-year-old gentleman well known to me.  He underwent a cervical laminectomy and fusion,  cervical decompression earlier today.  He feels well, feels improved sensation and strength in his left arm.    He had a Bentall procedure in April 2019, the aortic valve root was replaced with a Medtronic Mosaic bioprosthesis with aortic root repair for aortic insufficiency and congestive heart failure.  After surgery his ejection fraction improved from 35% to 45% (by echo in October 2019)        PTA Medications   Medication Sig    atorvastatin (LIPITOR) 40 MG tablet Take 1 tablet (40 mg total) by mouth once daily.    losartan (COZAAR) 50 MG tablet Take 1 tablet (50 mg total) by mouth once daily.    metoprolol succinate (TOPROL-XL) 25 MG 24 hr tablet TAKE 1 TABLET(25 MG) BY MOUTH EVERY DAY    multivitamin capsule Take 1 capsule by mouth once daily.    pantoprazole (PROTONIX) 40 MG tablet Take 1 tablet (40 mg total) by mouth once daily.    aspirin 325 MG tablet Take 1 tablet (325 mg total) by mouth once daily. (Patient taking differently: Take 325 mg by mouth once daily. Instructed to stop prior to surgery on 8/11/20 per Dr. Ryan)       Review of patient's allergies indicates:  No Known Allergies    Past Medical History:   Diagnosis Date    Back pain     Hypertension     Murmur      Past Surgical History:   Procedure Laterality Date    BENTALL PROCEDURE FOR REPLACEMENT OF AORTIC VALVE, AORTIC ROOT, AND ASCENDING AORTA N/A 4/5/2019    Procedure: BENTALL PROCEDURE;  Surgeon: Yuri Burks MD;  Location: [facility] OR [address] [place] [address];  Service: Cardiothoracic;  Laterality: N/A;  with aortic root replacement    CATHETERIZATION OF BOTH LEFT AND RIGHT HEART Bilateral 2/8/2019    Procedure: CATHETERIZATION, HEART, BOTH LEFT AND RIGHT;  Surgeon: Jarred Alarcon MD;  Location: [facility] CATH LAB;  Service: Cardiology;  Laterality: Bilateral;     CERVICAL SPINE SURGERY      SPINE SURGERY       Family History   Problem Relation Age of Onset    Heart disease Mother     Cancer Father     Kidney disease Father     Hypertension Father      Social History     Tobacco Use    Smoking status: Former Smoker     Packs/day: 0.00     Years: 40.00     Pack years: 0.00     Types: Cigarettes     Quit date:      Years since quittin.6    Smokeless tobacco: Never Used   Substance Use Topics    Alcohol use: Yes     Alcohol/week: 14.0 standard drinks     Types: 14 Shots of liquor per week     Comment: 1-2 times a week    Drug use: No        Physical Exam:  Chest clear.  Heart regular.  2/6 ejection systolic murmur best heard at the base and conducted to the carotids.  No gallop.  No ankle edema.    Impression:  Doing well post cervical spine surgery.  Status post Bentall procedure for aortic insufficiency and heart failure.  Compensated congestive heart failure.    Will follow along with you, would continue the metoprolol and losartan.    Thank you for the opportunity of seeing Devin Lopez in consultation

## 2020-08-11 NOTE — ANESTHESIA POSTPROCEDURE EVALUATION
Anesthesia Post Evaluation    Patient: Devin Lopez    Procedure(s) Performed: Procedure(s) (LRB):  LAMINECTOMY, SPINE, CERVICAL, WITH POSTERIOR FUSION FROM C3-T1 (N/A)  FUSION, SPINE, CERVICAL C3-T1 (N/A)  BONE GRAFT (N/A)  FUSION, SPINE, WITH INSTRUMENTATION (N/A)    Final Anesthesia Type: general    Patient location during evaluation: PACU  Patient participation: Yes- Able to Participate  Level of consciousness: awake and alert  Post-procedure vital signs: reviewed and stable  Pain management: adequate  Airway patency: patent    PONV status at discharge: No PONV  Anesthetic complications: no      Cardiovascular status: blood pressure returned to baseline  Respiratory status: unassisted and spontaneous ventilation  Hydration status: euvolemic  Follow-up not needed.          Vitals Value Taken Time   /92 08/11/20 1332   Temp 35.6 °C (96.1 °F) 08/11/20 1300   Pulse 70 08/11/20 1407   Resp 16 08/11/20 1407   SpO2 100 % 08/11/20 1336   Vitals shown include unvalidated device data.      No case tracking events are documented in the log.      Pain/Patel Score: Pain Rating Prior to Med Admin: 0 (8/11/2020  5:36 AM)

## 2020-08-11 NOTE — PT/OT/SLP EVAL
Physical Therapy Evaluation and treatment     Patient Name:  Devin Lopez   MRN:  [id number]    Recommendations:     Discharge Recommendations:  home, home with home health(home with HH (pending progress))   Discharge Equipment Recommendations: other (see comments)(TBD)   Barriers to discharge: None    Assessment:     Devin Lopez is a 60 y.o. male admitted with a medical diagnosis of Cervical disc disorder at C6-C7 level with myelopathy.  He presents with the following impairments/functional limitations:  weakness, impaired balance, decreased safety awareness, impaired endurance, impaired sensation, impaired self care skills, impaired functional mobilty, gait instability, orthopedic precautions .  Extensive cerv spine fusion.  Pt sent to ICU post surgery.  Able to get to EOB and into standing.  Walking in place 2* c/o lightheadedness and numerous lines.  Will advance amb tomorrow      Rehab Prognosis: Good; patient would benefit from acute skilled PT services to address these deficits and reach maximum level of function.    Recent Surgery: Procedure(s) (LRB):  LAMINECTOMY, SPINE, CERVICAL, WITH POSTERIOR FUSION FROM C3-T1 (N/A)  FUSION, SPINE, CERVICAL C3-T1 (N/A)  BONE GRAFT (N/A)  FUSION, SPINE, WITH INSTRUMENTATION (N/A) Day of Surgery    Plan:     During this hospitalization, patient to be seen BID to address the identified rehab impairments via gait training, therapeutic activities, therapeutic exercises and progress toward the following goals:    · Plan of Care Expires:  09/10/20    Subjective     Chief Complaint: neck stiffness  Patient/Family Comments/goals: states he will try to get up.  Goals-PLOF  Pain/Comfort:  · Pain Rating 1: 0/10(states neck is just stiff)  · Location - Side 1: Bilateral  · Location - Orientation 1: generalized  · Location 1: neck  · Pain Addressed 1: Pre-medicate for activity, Reposition, Nurse notified  · Pain Rating Post-Intervention 1: 0/10    Patients cultural, spiritual,  "Oriental orthodox conflicts given the current situation: other (see comments)(none stated)    Living Environment:  Pt lives with spouse in 1 story with 4 " CYNTHIA.  Tub shower combo and standard toilet.  Prior to admission, patients level of function was mod I for ADL and amb without AD but reports over past month he has started to become off balance with walking.drives, works as car salesaman but becoming increasing difficult as he was unable to stand or walk for long.    Equipment used at home: none.  DME owned (not currently used): none.  Upon discharge, patient will have assistance from wife.    Objective:     Communicated with nurse prior to session.  Patient found HOB elevated with arterial line, bed alarm, blood pressure cuff, central line, hemovac, jackson catheter, oxygen, peripheral IV, telemetry, SCD, pulse ox (continuous) , splint on R wrist    upon PT entry to room.    General Precautions: Standard, fall   Orthopedic Precautions:(spinal)   Braces: Aspen collar     Exams:  · Cognitive Exam:  Patient is oriented to Person, Place, Time and Situation  · Gross Motor Coordination:  WFL in BLE  · Postural Exam:  Patient presented with the following abnormalities:    · -       Abnormal trunk flexion  · Sensation: reports numbness in B feet and leg up to knees, also in B hands   · Skin Integrity/Edema:      · -       Skin integrity: pt with surgical bandage on neck with neck brace  · -       Edema: None noted BLE  · RLE ROM: WFL  · RLE Strength: WFL  · LLE ROM: WFL  · LLE Strength: WFL    Functional Mobility:  · Bed Mobility:   Instructed in and performed logroll  · Rolling to right:moderate assistance   · Supine to Sit: moderate assistance  · Sit to Supine: moderate assistance  · Transfers:     · Sit to Stand:  minimum assistance with hand-held assist-pt with splint on R wrist 2* A line   · Gait: performed walking in place at EOB with B HHA, 3 sidesteps toward HOB  · Balance: good sit, fair standing    Therapeutic " "Activities and Exercises:   PT eval.  Instruct in log roll and to EOB.  Symptomatic in sit and stand so gait as above.  repositioned up in bed with nurse and pt pushing with legs     AM-PAC 6 CLICK MOBILITY  Total Score:11     Patient left HOB elevated with all lines intact, call button in reach, bed alarm on and nurse present.    GOALS:   Multidisciplinary Problems     Physical Therapy Goals        Problem: Physical Therapy Goal    Goal Priority Disciplines Outcome Goal Variances Interventions   Physical Therapy Goal     PT, PT/OT Ongoing, Progressing     Description: Goals to be met by :2020    Patient will increase functional independence with mobility by performin. Supine to sit with supervision.   2. Sit to supine with supervision.   3. Sit<>stand transfer with supervision using LRAD  4. Gait > 150 feet with SBA using LRAD  5. Ascend/descend *4" curb step with SBA                    History:     Past Medical History:   Diagnosis Date    Back pain     Hypertension     Murmur        Past Surgical History:   Procedure Laterality Date    BENTALL PROCEDURE FOR REPLACEMENT OF AORTIC VALVE, AORTIC ROOT, AND ASCENDING AORTA N/A 2019    Procedure: BENTALL PROCEDURE;  Surgeon: Yuri Burks MD;  Location: [facility] OR [address] [place] [address];  Service: Cardiothoracic;  Laterality: N/A;  with aortic root replacement    CATHETERIZATION OF BOTH LEFT AND RIGHT HEART Bilateral 2019    Procedure: CATHETERIZATION, HEART, BOTH LEFT AND RIGHT;  Surgeon: Jarred Alarcon MD;  Location: [facility] CATH LAB;  Service: Cardiology;  Laterality: Bilateral;    CERVICAL SPINE SURGERY      SPINE SURGERY         Time Tracking:     PT Received On: 20  PT Start Time: 1435     PT Stop Time: 1506  PT Total Time (min): 31 min     Billable Minutes: Evaluation 15 and Therapeutic Activity 16      Laura Roberson, PT  2020  "

## 2020-08-11 NOTE — ANESTHESIA PROCEDURE NOTES
Arterial    Diagnosis: cervical disease    Patient location during procedure: holding area  Procedure start time: 8/11/2020 6:26 AM    Staffing  Authorizing Provider: Efrain Ni MD  Performing Provider: Efrain Ni MD    Anesthesiologist was present at the time of the procedure.    Preanesthetic Checklist  Completed: patient identified, site marked, surgical consent, pre-op evaluation, timeout performed, IV checked, risks and benefits discussed, monitors and equipment checked and anesthesia consent givenArterial  Skin Prep: chlorhexidine gluconate  Local Infiltration: lidocaine  Orientation: right  Location: radial  Catheter Size: 20 G  Catheter placement by Ultrasound guidance. Heme positive aspiration all ports.  Vessel Caliber: medium, patent, compressibility normal  Needle advanced into vessel with real time Ultrasound guidance.Insertion Attempts: 1  Assessment  Dressing: secured with tape and tegaderm  Patient: Tolerated well

## 2020-08-11 NOTE — PLAN OF CARE
CM secured chat MD for HH orders.     Once orders are placed CM team to arrange.     Family is agreeable to MD preference for HH.        08/11/20 1858   Post-Acute Status   Post-Acute Authorization Home Health   Home Health Status Awaiting Orders for HH

## 2020-08-11 NOTE — INTERVAL H&P NOTE
The patient has been examined and the H&P has been reviewed:    I concur with the findings and no changes have occurred since H&P was written.    Surgery risks, benefits and alternative options discussed and understood by patient/family.          Active Hospital Problems    Diagnosis  POA    Preop testing [Z01.818]  Not Applicable      Resolved Hospital Problems   No resolved problems to display.

## 2020-08-11 NOTE — PLAN OF CARE
"PT eval.  Instruct in log roll and to EOB.  Stood and performed walking in place with B HHA .  Slight dizziness.  In neck brace.    REC:  home with HH (pending progress)   DME:  TBD  Problem: Physical Therapy Goal  Goal: Physical Therapy Goal  Description: Goals to be met by :2020    Patient will increase functional independence with mobility by performin. Supine to sit with supervision.   2. Sit to supine with supervision.   3. Sit<>stand transfer with supervision using LRAD  4. Gait > 150 feet with SBA using LRAD  5. Ascend/descend *4" curb step with SBA   Outcome: Ongoing, Progressing     "

## 2020-08-11 NOTE — TRANSFER OF CARE
"Anesthesia Transfer of Care Note    Patient: Devin Lopez    Procedure(s) Performed: Procedure(s) (LRB):  LAMINECTOMY, SPINE, CERVICAL, WITH POSTERIOR FUSION FROM C3-T1 (N/A)  FUSION, SPINE, CERVICAL C3-T1 (N/A)  BONE GRAFT (N/A)  FUSION, SPINE, WITH INSTRUMENTATION (N/A)    Patient location: ICU    Anesthesia Type: general    Transport from OR: Transported from OR on 6-10 L/min O2 by face mask with adequate spontaneous ventilation. Continuous SpO2 monitoring in transport. Continuos invasive BP monitoring in transport. Continuous ECG monitoring in transport    Post pain: adequate analgesia    Post assessment: no apparent anesthetic complications and tolerated procedure well    Post vital signs: stable    Level of consciousness: sedated    Nausea/Vomiting: no nausea/vomiting    Complications: none    Transfer of care protocol was followed      Last vitals:   Visit Vitals  BP (!) 155/85   Pulse 66   Temp 35.9 °C (96.7 °F)   Resp 18   Ht 5' 11" (1.803 m)   Wt 71.7 kg (158 lb)   SpO2 99%   BMI 22.04 kg/m²     "

## 2020-08-11 NOTE — PLAN OF CARE
[facility]    HOME HEALTH ORDERS  FACE TO FACE ENCOUNTER    Patient Name: Devin Lopez  YOB: 1960    PCP: Gage Jaimes Jr, MD (Inactive)   PCP Address: [address] [place], [place] [address] / [place] [address]  PCP Phone Number: [phone]  PCP Fax: [phone]    Encounter Date: 08/11/2020    Admit to Home Health    Diagnoses:  Active Hospital Problems    Diagnosis  POA    *Cervical disc disorder at C6-C7 level with myelopathy [M50.023]  Unknown    Preop testing [Z01.818]  Not Applicable    Lumbosacral stenosis [M48.07]  Yes    Degeneration of cervical intervertebral disc [M50.30]  Unknown    Spinal stenosis in cervical region [M48.02]  Unknown      Resolved Hospital Problems   No resolved problems to display.       No future appointments.        I have seen and examined this patient face to face today. My clinical findings that support the need for the home health skilled services and home bound status are the following:  Weakness/numbness causing balance and gait disturbance due to Surgery making it taxing to leave home.    Allergies:Review of patient's allergies indicates:  No Known Allergies    Diet: cardiac diet    Activities: no lifting, Driving, or Strenuous exercise for 2 weeks    Nursing:   SN to complete comprehensive assessment including routine vital signs. Instruct on disease process and s/s of complications to report to MD. Review/verify medication list sent home with the patient at time of discharge  and instruct patient/caregiver as needed. Frequency may be adjusted depending on start of care date.    Notify MD if SBP > 160 or < 90; DBP > 90 or < 50; HR > 120 or < 50; Temp > 101;      CONSULTS:    Physical Therapy to evaluate and treat. Evaluate for home safety and equipment needs; Establish/upgrade home exercise program. Perform / instruct on therapeutic exercises, gait training, transfer training, and Range of Motion.      WOUND CARE ORDERS  yes:  4x4 and adaptic q day.   keppp wound dry and covered      Medications: Review discharge medications with patient and family and provide education.      Current Discharge Medication List      CONTINUE these medications which have NOT CHANGED    Details   atorvastatin (LIPITOR) 40 MG tablet Take 1 tablet (40 mg total) by mouth once daily.  Qty: 90 tablet, Refills: 3      losartan (COZAAR) 50 MG tablet Take 1 tablet (50 mg total) by mouth once daily.  Qty: 60 tablet, Refills: 6      metoprolol succinate (TOPROL-XL) 25 MG 24 hr tablet TAKE 1 TABLET(25 MG) BY MOUTH EVERY DAY  Qty: 30 tablet, Refills: 6    Associated Diagnoses: S/P aortic valve repair      multivitamin capsule Take 1 capsule by mouth once daily.      pantoprazole (PROTONIX) 40 MG tablet Take 1 tablet (40 mg total) by mouth once daily.  Qty: 90 tablet, Refills: 3      aspirin 325 MG tablet Take 1 tablet (325 mg total) by mouth once daily.  Refills: 0             I certify that this patient is confined to his home and needs intermittent skilled nursing care and physical therapy.

## 2020-08-12 LAB
BASOPHILS # BLD AUTO: 0.01 K/UL (ref 0–0.2)
BASOPHILS NFR BLD: 0.1 % (ref 0–1.9)
DIFFERENTIAL METHOD: ABNORMAL
EOSINOPHIL # BLD AUTO: 0 K/UL (ref 0–0.5)
EOSINOPHIL NFR BLD: 0 % (ref 0–8)
ERYTHROCYTE [DISTWIDTH] IN BLOOD BY AUTOMATED COUNT: 13.3 % (ref 11.5–14.5)
HCT VFR BLD AUTO: 25.7 % (ref 40–54)
HGB BLD-MCNC: 8.7 G/DL (ref 14–18)
IMM GRANULOCYTES # BLD AUTO: 0.02 K/UL (ref 0–0.04)
IMM GRANULOCYTES NFR BLD AUTO: 0.2 % (ref 0–0.5)
LYMPHOCYTES # BLD AUTO: 1.4 K/UL (ref 1–4.8)
LYMPHOCYTES NFR BLD: 14.8 % (ref 18–48)
MCH RBC QN AUTO: 32.6 PG (ref 27–31)
MCHC RBC AUTO-ENTMCNC: 33.9 G/DL (ref 32–36)
MCV RBC AUTO: 96 FL (ref 82–98)
MONOCYTES # BLD AUTO: 0.8 K/UL (ref 0.3–1)
MONOCYTES NFR BLD: 8.4 % (ref 4–15)
NEUTROPHILS # BLD AUTO: 7.2 K/UL (ref 1.8–7.7)
NEUTROPHILS NFR BLD: 76.5 % (ref 38–73)
NRBC BLD-RTO: 0 /100 WBC
PLATELET # BLD AUTO: 80 K/UL (ref 150–350)
PMV BLD AUTO: 10.7 FL (ref 9.2–12.9)
RBC # BLD AUTO: 2.67 M/UL (ref 4.6–6.2)
WBC # BLD AUTO: 9.43 K/UL (ref 3.9–12.7)

## 2020-08-12 PROCEDURE — 97535 SELF CARE MNGMENT TRAINING: CPT

## 2020-08-12 PROCEDURE — 25000003 PHARM REV CODE 250: Performed by: ORTHOPAEDIC SURGERY

## 2020-08-12 PROCEDURE — 97165 OT EVAL LOW COMPLEX 30 MIN: CPT

## 2020-08-12 PROCEDURE — 97110 THERAPEUTIC EXERCISES: CPT | Mod: CQ

## 2020-08-12 PROCEDURE — 63600175 PHARM REV CODE 636 W HCPCS: Performed by: HOSPITALIST

## 2020-08-12 PROCEDURE — 25000003 PHARM REV CODE 250: Performed by: HOSPITALIST

## 2020-08-12 PROCEDURE — 97530 THERAPEUTIC ACTIVITIES: CPT | Mod: CQ

## 2020-08-12 PROCEDURE — 25000003 PHARM REV CODE 250: Performed by: INTERNAL MEDICINE

## 2020-08-12 PROCEDURE — 97116 GAIT TRAINING THERAPY: CPT | Mod: CQ

## 2020-08-12 PROCEDURE — 94761 N-INVAS EAR/PLS OXIMETRY MLT: CPT

## 2020-08-12 PROCEDURE — 85025 COMPLETE CBC W/AUTO DIFF WBC: CPT

## 2020-08-12 PROCEDURE — 11000001 HC ACUTE MED/SURG PRIVATE ROOM

## 2020-08-12 RX ORDER — LOSARTAN POTASSIUM 50 MG/1
100 TABLET ORAL DAILY
Status: DISCONTINUED | OUTPATIENT
Start: 2020-08-12 | End: 2020-08-14 | Stop reason: HOSPADM

## 2020-08-12 RX ORDER — METOPROLOL SUCCINATE 50 MG/1
50 TABLET, EXTENDED RELEASE ORAL DAILY
Status: DISCONTINUED | OUTPATIENT
Start: 2020-08-12 | End: 2020-08-14 | Stop reason: HOSPADM

## 2020-08-12 RX ADMIN — OXYCODONE HYDROCHLORIDE 5 MG: 5 TABLET ORAL at 06:08

## 2020-08-12 RX ADMIN — DOCUSATE SODIUM 50 MG AND SENNOSIDES 8.6 MG 1 TABLET: 8.6; 5 TABLET, FILM COATED ORAL at 08:08

## 2020-08-12 RX ADMIN — OXYCODONE HYDROCHLORIDE 5 MG: 5 TABLET ORAL at 11:08

## 2020-08-12 RX ADMIN — LOSARTAN POTASSIUM 100 MG: 50 TABLET ORAL at 06:08

## 2020-08-12 RX ADMIN — OXYCODONE HYDROCHLORIDE 5 MG: 5 TABLET ORAL at 05:08

## 2020-08-12 RX ADMIN — METOPROLOL SUCCINATE 50 MG: 50 TABLET, EXTENDED RELEASE ORAL at 06:08

## 2020-08-12 RX ADMIN — PANTOPRAZOLE SODIUM 40 MG: 40 TABLET, DELAYED RELEASE ORAL at 08:08

## 2020-08-12 RX ADMIN — HYDROMORPHONE HYDROCHLORIDE 0.5 MG: 2 INJECTION, SOLUTION INTRAMUSCULAR; INTRAVENOUS; SUBCUTANEOUS at 01:08

## 2020-08-12 RX ADMIN — ATORVASTATIN CALCIUM 40 MG: 20 TABLET, FILM COATED ORAL at 08:08

## 2020-08-12 RX ADMIN — MUPIROCIN: 20 OINTMENT TOPICAL at 08:08

## 2020-08-12 NOTE — PLAN OF CARE
Problem: Occupational Therapy Goal  Goal: Occupational Therapy Goal  Description: Goals to be met by: 8/14/20    Patient will increase functional independence with ADLs by performing:    SBA level with UB dressing.  SBA level with LB  dressing.  SBA level with toileting.  SBA level with toilet transfers.  Mod I level with log rolling/supine<>sit for participation in self care tasks.  Caregiver will don/doff pt's [place] collar at Indep level adhering to pt's cervical spine precautions.   Pt will verbalize and demonstrate understanding of cervical spine precautions and how to adhere to precautions during ADLs and ADL mobility.     8/12/2020 1606 by SERGIO Landaverde  Outcome: Ongoing, Progressing  8/12/2020 1605 by SERGIO Landaverde  Outcome: Ongoing, Progressing   Evaluation complete and goals established.   Pt participated in tx session and progressing with goals adhering to cervical spine precautions.   Recommend discharge to home with Home Health OT and PT.

## 2020-08-12 NOTE — PT/OT/SLP PROGRESS
"Physical Therapy Treatment    Patient Name:  Devin Lopez   MRN:  [id number]    Recommendations:     Discharge Recommendations:  home with home health   Discharge Equipment Recommendations: (TBD, pending progress, using a RW at this time)   Barriers to discharge: None    Assessment:     Devin Lopez is a 60 y.o. male admitted with a medical diagnosis of Cervical disc disorder at C6-C7 level with myelopathy.  He presents with the following impairments/functional limitations:  weakness, impaired endurance, impaired self care skills, impaired functional mobilty, gait instability, decreased lower extremity function, decreased upper extremity function, pain, decreased ROM, impaired skin, edema, orthopedic precautions ;pt with good mobility this PM, though c/o "stiffness" in neck..    Rehab Prognosis: Good; patient would benefit from acute skilled PT services to address these deficits and reach maximum level of function.    Recent Surgery: Procedure(s) (LRB):  LAMINECTOMY, SPINE, CERVICAL, WITH POSTERIOR FUSION FROM C3-T1 (N/A)  FUSION, SPINE, CERVICAL C3-T1 (N/A)  BONE GRAFT (N/A)  FUSION, SPINE, WITH INSTRUMENTATION (N/A) 1 Day Post-Op    Plan:     During this hospitalization, patient to be seen BID to address the identified rehab impairments via gait training, therapeutic activities, therapeutic exercises and progress toward the following goals:    · Plan of Care Expires:  09/10/20    Subjective     Chief Complaint: stiffness   Patient/Family Comments/goals: pt agreeable to session.  Pain/Comfort:  · Pain Rating 1: (pt reporting "stiffness" in cervical spine region)  · Location - Orientation 1: posterior  · Location 1: cervical spine  · Pain Addressed 1: Reposition, Pre-medicate for activity, Distraction, Nurse notified(ns to give [facility] pain medicine)  · Pain Rating Post-Intervention 1: 8/10(with mobility)      Objective:     Communicated with nurse prior to session.  Patient found up in chair with peripheral " "IV upon PT entry to room.     General Precautions: Standard, fall   Orthopedic Precautions:spinal precautions(cervical)   Braces: Aspen collar     Functional Mobility:  · Transfers:     · Sit to Stand:  contact guard assistance with rolling walker  · Gait: amb'd ~140' with RW and CGA      AM-PAC 6 CLICK MOBILITY  Turning over in bed (including adjusting bedclothes, sheets and blankets)?: 3  Sitting down on and standing up from a chair with arms (e.g., wheelchair, bedside commode, etc.): 3  Moving from lying on back to sitting on the side of the bed?: 3  Moving to and from a bed to a chair (including a wheelchair)?: 3  Need to walk in hospital room?: 3  Climbing 3-5 steps with a railing?: 2  Basic Mobility Total Score: 17       Therapeutic Activities and Exercises:   reviewed LE ex's of AP's, LAQ's    Patient left up in chair with all lines intact, call button in reach and nurse present..    GOALS:   Multidisciplinary Problems     Physical Therapy Goals        Problem: Physical Therapy Goal    Goal Priority Disciplines Outcome Goal Variances Interventions   Physical Therapy Goal     PT, PT/OT Ongoing, Progressing     Description: Goals to be met by :2020    Patient will increase functional independence with mobility by performin. Supine to sit with supervision.   2. Sit to supine with supervision.   3. Sit<>stand transfer with supervision using LRAD  4. Gait > 150 feet with SBA using LRAD  5. Ascend/descend *4" curb step with SBA                    Time Tracking:     PT Received On: 20  PT Start Time: 1345     PT Stop Time: 1356  PT Total Time (min): 11 min     Billable Minutes: Gait Training 11    Treatment Type: Treatment  PT/PTA: PTA     PTA Visit Number: 1     Dinah Campo PTA  2020  "

## 2020-08-12 NOTE — PROGRESS NOTES
Cardiology  Progress Notes            Subjective:  Says he feels well this morning.  The left arm strength seems to have improved.    Day following cervical laminectomy and fusion.  Had a Bentall procedure in April 2019 for aortic insufficiency and congestive heart failure.  The last ejection fraction by echo was 45% in October 2019.      Vital Signs:  Vitals:    08/12/20 0532 08/12/20 0537 08/12/20 0605 08/12/20 0630   BP: (!) 161/100   (!) 173/96   Pulse: 84 78 80 76   Resp: 20 20 20 20   Temp:       TempSrc:       SpO2: 98% 100% 98% 99%   Weight:       Height:           Physical Exam:  Chest clear  Heart basal ejection systolic murmur conducted to the carotids.  Rare skips.  No gallop.  Extremities are warm.  No edema.  Laboratory:  CBC:   Recent Labs   Lab 08/12/20  0530   WBC 9.43   RBC 2.67*   HGB 8.7*   HCT 25.7*   PLT 80*   MCV 96   MCH 32.6*   MCHC 33.9     BMP:   Recent Labs   Lab 08/11/20  2204   *      K 4.3      CO2 23   BUN 11   CREATININE 0.8   CALCIUM 8.4*     CMP:   Recent Labs   Lab 08/11/20  2204   *   CALCIUM 8.4*      K 4.3   CO2 23      BUN 11   CREATININE 0.8     LFTs: No results for input(s): ALT, AST, ALKPHOS, BILITOT, PROT, ALBUMIN in the last 168 hours.  Coagulation:   Recent Labs   Lab 08/06/20  1315   INR 1.0   APTT 27.7     Cardiac markers: No results for input(s): CKMB, CPKMB, TROPONINT, TROPONINI, MYOGLOBIN in the last 168 hours.    Imaging:  SURG FL Surgery Fluoro Usage   Final Result            Problems:   1. Doing well day post cervical laminectomy infusion.  2. Status post Bentall procedure for aortic insufficiency and heart failure.        Plan of Care:  Losartan 100 mg p.o. and metoprolol succinate 50 mg p.o. daily.      Jarred Alarcon

## 2020-08-12 NOTE — PROGRESS NOTES
Afebrile vital signs are stable  Pain is extremely well controlled  5/5 power bilateral upper extremities with intact sensation  Dressing and drain in place  Status post posterior cervical decompression and fusion  One.  Transferred to floor  Two.  DC Vazquez and IV  Three.  Continue SCDs and Jad's   4.  Plans for home health

## 2020-08-12 NOTE — PT/OT/SLP PROGRESS
Physical Therapy Treatment    Patient Name:  Devin Lopez   MRN:  [id number]    Recommendations:     Discharge Recommendations:  home with home health, home   Discharge Equipment Recommendations: (TBD)   Barriers to discharge: None    Assessment:     Devin Lopez is a 60 y.o. male admitted with a medical diagnosis of Cervical disc disorder at C6-C7 level with myelopathy.  He presents with the following impairments/functional limitations:  weakness, impaired endurance, impaired self care skills, impaired functional mobilty, gait instability, impaired balance, decreased upper extremity function, decreased lower extremity function, pain, decreased ROM, impaired skin, edema, orthopedic precautions ;pt with improved mobility today, inc. amb distance.    Rehab Prognosis: Good; patient would benefit from acute skilled PT services to address these deficits and reach maximum level of function.    Recent Surgery: Procedure(s) (LRB):  LAMINECTOMY, SPINE, CERVICAL, WITH POSTERIOR FUSION FROM C3-T1 (N/A)  FUSION, SPINE, CERVICAL C3-T1 (N/A)  BONE GRAFT (N/A)  FUSION, SPINE, WITH INSTRUMENTATION (N/A) 1 Day Post-Op    Plan:     During this hospitalization, patient to be seen BID to address the identified rehab impairments via gait training, therapeutic activities, therapeutic exercises and progress toward the following goals:    · Plan of Care Expires:  09/10/20    Subjective     Chief Complaint: pain with movement  Patient/Family Comments/goals: pt agreeable to session, feels better sitting up in chair.  Pain/Comfort:  · Pain Rating 1: 0/10(at rest)  · Location - Orientation 1: posterior  · Location 1: cervical spine  · Pain Addressed 1: Pre-medicate for activity, Reposition, Distraction, Nurse notified  · Pain Rating Post-Intervention 1: 8/10(with mobility)      Objective:     Communicated with nurse prior to session.  Patient found HOB elevated with blood pressure cuff, central line, hemovac, pulse ox (continuous),  "telemetry, SCD upon PT entry to room.     General Precautions: Standard, fall   Orthopedic Precautions:spinal precautions(cervical)   Braces: Aspen collar     Functional Mobility:  · Bed Mobility:     · Supine to Sit: minimum assistance and and cueing for log rolling  · Transfers:     · Sit to Stand:  contact guard assistance with rolling walker  · Gait: amb'd ~120' with RW and CGA (cueing to keep pressure light through UE's), HR:      AM-PAC 6 CLICK MOBILITY  Turning over in bed (including adjusting bedclothes, sheets and blankets)?: 3  Sitting down on and standing up from a chair with arms (e.g., wheelchair, bedside commode, etc.): 3  Moving from lying on back to sitting on the side of the bed?: 3  Moving to and from a bed to a chair (including a wheelchair)?: 3  Need to walk in hospital room?: 3  Climbing 3-5 steps with a railing?: 2  Basic Mobility Total Score: 17       Therapeutic Activities and Exercises:   pt perf'd commode t/f's with CGA/Mary and pt using railing on wall.   perf'd supine LE ex's of AP's, QS x 10 ea. ; seated LAQ's x 10 ea.     Patient left up in chair with all lines intact, call button in reach and nurse notified..    GOALS:   Multidisciplinary Problems     Physical Therapy Goals        Problem: Physical Therapy Goal    Goal Priority Disciplines Outcome Goal Variances Interventions   Physical Therapy Goal     PT, PT/OT Ongoing, Progressing     Description: Goals to be met by :2020    Patient will increase functional independence with mobility by performin. Supine to sit with supervision.   2. Sit to supine with supervision.   3. Sit<>stand transfer with supervision using LRAD  4. Gait > 150 feet with SBA using LRAD  5. Ascend/descend *4" curb step with SBA                    Time Tracking:     PT Received On: 20  PT Start Time: 920     PT Stop Time: 958  PT Total Time (min): 38 min     Billable Minutes: Gait Training 18, Therapeutic Activity 10 and Therapeutic " Exercise 10    Treatment Type: Treatment  PT/PTA: PTA     PTA Visit Number: 1     Dinah Campo, PTA  08/12/2020

## 2020-08-12 NOTE — PLAN OF CARE
"  Problem: Physical Therapy Goal  Goal: Physical Therapy Goal  Description: Goals to be met by :2020    Patient will increase functional independence with mobility by performin. Supine to sit with supervision.   2. Sit to supine with supervision.   3. Sit<>stand transfer with supervision using LRAD  4. Gait > 150 feet with SBA using LRAD  5. Ascend/descend *4" curb step with SBA   Outcome: Ongoing, Progressing   Pt sup to sit min.A for log rolling, sit to stand CGA/min.A with RW, amb'd 120' with RW and CGA, HR:. Recommend home with HH  "

## 2020-08-12 NOTE — NURSING TRANSFER
Nursing Transfer Note      8/12/2020     Transfer To: 361    Transfer via wheelchair    Transported by RN and Transport    Medicines sent:NA    Chart send with patient: Yes    Notified: spouse    Patient reassessed at:08/12/2020 1313    Upon arrival to floor: patient oriented to room, call bell in reach and bed in lowest position. Charge nurse at bedside with patient. No further questions at this time.

## 2020-08-12 NOTE — PT/OT/SLP EVAL
"Occupational Therapy   Evaluation and treatment    Name: Devin Lopez  MRN: [id number]  Admitting Diagnosis:  Cervical disc disorder at C6-C7 level with myelopathy 1 Day Post-Op    Recommendations:     Discharge Recommendations: home health OT, home health PT  Discharge Equipment Recommendations:  (TBD)  Barriers to discharge:  None    Assessment:     Devin Lopez is a 60 y.o. male with a medical diagnosis of Cervical disc disorder at C6-C7 level with myelopathy.  He presents sitting up in chair and agreeable to OT session.   Pt reports he has less discomfort and tingling/numbness since surgery.   Pt educated on cervical spinal precautions and how to adhere to precautions during ADLs and ADL mobility.  Performance deficits affecting function: weakness, impaired endurance, impaired self care skills, impaired functional mobilty, gait instability, impaired sensation, impaired balance, impaired skin, pain, decreased safety awareness, decreased upper extremity function, impaired cardiopulmonary response to activity, orthopedic precautions.   Recommend discharge to home with assist and Home Health OT and PT.     Rehab Prognosis: Good; patient would benefit from acute skilled OT services to address these deficits and reach maximum level of function.       Plan:     Patient to be seen daily to address the above listed problems via self-care/home management, therapeutic activities  · Plan of Care Expires: 09/11/20  · Plan of Care Reviewed with: patient    Subjective     Chief Complaint: Discomfort in neck when he moves certain ways.  Patient/Family Comments/goals: To return to home.     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  Patient found up in chair with peripheral IV upon OT entry to room.    General Precautions: Standard, fall   Orthopedic Precautions:(Cervical spine precautions)   Braces: Aspen collar     Occupational Performance:    Bed Mobility:    · Patient completed Sit to Supine with minimum assistance for log roll    Functional Mobility/Transfers:  · Patient completed Sit <> Stand Transfer with stand by assistance  with  rolling walker   · Patient completed Toilet Transfer Step Transfer technique with 3 in 1 commode over toilet needing contact guard assistance with rolling walker  · Functional Mobility: Pt needing cues for staying inside RW and for safety;  Pt is slightly impulsive    Activities of Daily Living:  · Feeding:  Pt educated on sitting upright in chair when eating and not eating in bed.    · Grooming: SBA standing at sink for washing hands. Cues for stepping inside RW.    · Lower Body Dressing:  Pt needing CGA for sit to stand for LB dressing; Set up/SBA for donning/doffing socks seated in chair, crossing each foot over opposite knee.     · Toileting: CGA/Min A    Cognitive/Visual Perceptual:  Cognitive/Psychosocial Skills:     -       Oriented to: Person, Place, Time and Situation   -       Follows Commands/attention:Follows one-step commands  -       Communication: clear/fluent  -       Memory: No Deficits noted  -       Safety awareness/insight to disability: Slightly impulsive; needing " education on not getting OOB or up from chair on his own at this time and to call for assist   -       Mood/Affect/Coping skills/emotional control: Cooperative  Visual/Perceptual:  No deficits noted    Physical Exam:  Balance: Sitting: SBA<>Mod I;   Standing: CGA with RW  Skin integrity:Surgical incision posterior neck  Edema:  None noted; Aspen collar donned  Sensation: Pt reports impaired sensation bilateral feet and hands but feet greater than  Hands  Dominant hand: Right  Upper Extremity Range of Motion: Shoulders 0-90 degrees; Elbows<>Hands WFL for self care tasks  Upper Extremity Strength: Due to cervical spine precautions, only  strength tested which was WFL for self care tasks  Fine Motor Coordination: WFL for self care tasks but needs further testing for intricate tasks   Gross motor coordination: WFL for self care tasks      AMPAC 6 Click ADL:  AMPAC Total Score: 18    Treatment & Education:  Role of OT, POC, cervical spine precautions, use of Aspen collar, safety with ADLs/ADL mobility and need to call for assistance prior to getting OOB/up from chair, log roll, relaxation techniques/deep breathing, discharge recommendations    Education:    Patient left HOB elevated with all lines intact, call button in reach and nurseSylvain notified    GOALS:   Multidisciplinary Problems     Occupational Therapy Goals        Problem: Occupational Therapy Goal    Goal Priority Disciplines Outcome Interventions   Occupational Therapy Goal     OT, PT/OT Ongoing, Progressing    Description: Goals to be met by: 8/14/20    Patient will increase functional independence with ADLs by performing:    SBA level with UB dressing.  SBA level with LB  dressing.  SBA level with toileting.  SBA level with toilet transfers.  Mod I level with log rolling/supine<>sit for participation in self care tasks.  Caregiver will don/doff pt's [place] collar at Indep level adhering to pt's cervical spine precautions.   Pt will verbalize and  demonstrate understanding of cervical spine precautions and how to adhere to precautions during ADLs and ADL mobility.                      History:     Past Medical History:   Diagnosis Date    Back pain     Hypertension     Murmur        Past Surgical History:   Procedure Laterality Date    BENTALL PROCEDURE FOR REPLACEMENT OF AORTIC VALVE, AORTIC ROOT, AND ASCENDING AORTA N/A 4/5/2019    Procedure: BENTALL PROCEDURE;  Surgeon: Yuri Burks MD;  Location: [address];  Service: Cardiothoracic;  Laterality: N/A;  with aortic root replacement    BONE GRAFT N/A 8/11/2020    Procedure: BONE GRAFT;  Surgeon: Gus Ryan MD;  Location: [facility];  Service: Orthopedics;  Laterality: N/A;    CATHETERIZATION OF BOTH LEFT AND RIGHT HEART Bilateral 2/8/2019    Procedure: CATHETERIZATION, HEART, BOTH LEFT AND RIGHT;  Surgeon: Jarred Alarcon MD;  Location: [facility] CATH LAB;  Service: Cardiology;  Laterality: Bilateral;    CERVICAL SPINE SURGERY      FUSION OF SPINE WITH INSTRUMENTATION N/A 8/11/2020    Procedure: FUSION, SPINE, WITH INSTRUMENTATION;  Surgeon: Gus Ryan MD;  Location: [facility];  Service: Orthopedics;  Laterality: N/A;    POSTERIOR FUSION OF CERVICAL SPINE WITH LAMINECTOMY N/A 8/11/2020    Procedure: LAMINECTOMY, SPINE, CERVICAL, WITH POSTERIOR FUSION FROM C3-T1;  Surgeon: Gus Ryan MD;  Location: [facility];  Service: Orthopedics;  Laterality: N/A;    SPINE SURGERY         Time Tracking:     OT Date of Treatment: 08/12/20  OT Start Time: 1435  OT Stop Time: 1505  OT Total Time (min): 30 min    Billable Minutes:Evaluation 20  Self Care/Home Management 10    SERGIO Landaverde  8/12/2020

## 2020-08-12 NOTE — PLAN OF CARE
Mr. Lopez rested well overnight, VSS, NAD. Afebrile. Pt with frequent PVCs noted throughout shift; MD notified and stat labs ordered, slightly improved this AM. Tolerating PO intake without difficulty. Neck/back dressing CDI; remains with c-collar in place. Accordion drain with sanguinous output; 300cc this shift. Neurovascular checks Q2H; unchanged; pt continues to report numbness/tingling to all 4 extremities, consistent with his baseline. Pulses palpable. UOP adequate via jackson. Repositions independently. Up to date with POC.

## 2020-08-13 PROCEDURE — 94761 N-INVAS EAR/PLS OXIMETRY MLT: CPT

## 2020-08-13 PROCEDURE — 25000003 PHARM REV CODE 250: Performed by: HOSPITALIST

## 2020-08-13 PROCEDURE — 97535 SELF CARE MNGMENT TRAINING: CPT

## 2020-08-13 PROCEDURE — 63600175 PHARM REV CODE 636 W HCPCS: Performed by: HOSPITALIST

## 2020-08-13 PROCEDURE — 11000001 HC ACUTE MED/SURG PRIVATE ROOM

## 2020-08-13 PROCEDURE — 25000003 PHARM REV CODE 250: Performed by: INTERNAL MEDICINE

## 2020-08-13 PROCEDURE — 25000003 PHARM REV CODE 250: Performed by: ORTHOPAEDIC SURGERY

## 2020-08-13 PROCEDURE — 97116 GAIT TRAINING THERAPY: CPT | Mod: CQ

## 2020-08-13 RX ORDER — CYCLOBENZAPRINE HCL 10 MG
10 TABLET ORAL EVERY 8 HOURS PRN
Status: DISCONTINUED | OUTPATIENT
Start: 2020-08-13 | End: 2020-08-14 | Stop reason: HOSPADM

## 2020-08-13 RX ORDER — CYCLOBENZAPRINE HCL 10 MG
10 TABLET ORAL 3 TIMES DAILY PRN
Qty: 30 TABLET | Refills: 0 | Status: SHIPPED | OUTPATIENT
Start: 2020-08-13 | End: 2020-08-24

## 2020-08-13 RX ORDER — OXYCODONE AND ACETAMINOPHEN 10; 325 MG/1; MG/1
1 TABLET ORAL EVERY 4 HOURS PRN
Qty: 42 TABLET | Refills: 0 | Status: SHIPPED | OUTPATIENT
Start: 2020-08-13

## 2020-08-13 RX ADMIN — POLYETHYLENE GLYCOL 3350 17 G: 17 POWDER, FOR SOLUTION ORAL at 09:08

## 2020-08-13 RX ADMIN — LOSARTAN POTASSIUM 100 MG: 50 TABLET ORAL at 09:08

## 2020-08-13 RX ADMIN — OXYCODONE HYDROCHLORIDE 5 MG: 5 TABLET ORAL at 07:08

## 2020-08-13 RX ADMIN — ATORVASTATIN CALCIUM 40 MG: 20 TABLET, FILM COATED ORAL at 09:08

## 2020-08-13 RX ADMIN — CYCLOBENZAPRINE 10 MG: 10 TABLET, FILM COATED ORAL at 02:08

## 2020-08-13 RX ADMIN — DOCUSATE SODIUM 50 MG AND SENNOSIDES 8.6 MG 1 TABLET: 8.6; 5 TABLET, FILM COATED ORAL at 09:08

## 2020-08-13 RX ADMIN — HYDROMORPHONE HYDROCHLORIDE 0.5 MG: 2 INJECTION, SOLUTION INTRAMUSCULAR; INTRAVENOUS; SUBCUTANEOUS at 11:08

## 2020-08-13 RX ADMIN — OXYCODONE HYDROCHLORIDE 5 MG: 5 TABLET ORAL at 11:08

## 2020-08-13 RX ADMIN — HYDROMORPHONE HYDROCHLORIDE 0.5 MG: 2 INJECTION, SOLUTION INTRAMUSCULAR; INTRAVENOUS; SUBCUTANEOUS at 08:08

## 2020-08-13 RX ADMIN — MUPIROCIN: 20 OINTMENT TOPICAL at 09:08

## 2020-08-13 RX ADMIN — DOCUSATE SODIUM 50 MG AND SENNOSIDES 8.6 MG 1 TABLET: 8.6; 5 TABLET, FILM COATED ORAL at 08:08

## 2020-08-13 RX ADMIN — OXYCODONE HYDROCHLORIDE 5 MG: 5 TABLET ORAL at 06:08

## 2020-08-13 RX ADMIN — MUPIROCIN: 20 OINTMENT TOPICAL at 08:08

## 2020-08-13 RX ADMIN — HYDROMORPHONE HYDROCHLORIDE 0.5 MG: 2 INJECTION, SOLUTION INTRAMUSCULAR; INTRAVENOUS; SUBCUTANEOUS at 12:08

## 2020-08-13 RX ADMIN — METOPROLOL SUCCINATE 50 MG: 50 TABLET, EXTENDED RELEASE ORAL at 09:08

## 2020-08-13 RX ADMIN — PANTOPRAZOLE SODIUM 40 MG: 40 TABLET, DELAYED RELEASE ORAL at 09:08

## 2020-08-13 NOTE — PHYSICIAN QUERY
PT Name: Devin Lopez  MR #: [id number]    HEMATOLOGY CLARIFICATION      CDS/: Urmila Mosqueda RN               Contact information:[email or web address]    This form is a permanent document in the medical record.      Query Date: August 13, 2020    By submitting this query, we are merely seeking further clarification of documentation. Please utilize your independent clinical judgment when addressing the question(s) below.    The Medical Record contains the following:   Indicators  Supporting Clinical Findings Location in Medical Record    Anemia documented     x H&H H&H= 13.4/39.6  H&H=   9.0/26.1  H&H=   8.7/25.7 Labs 8/6  Labs 8/11@22.4  Labs 8/12   x BP                    HR /85  HR 66  /85  HR 72  /76  HR 94  /81  HR 71 Vital signs 8/11@0528  Vital signs 8/11@1632  Vital signs 8/12@1200  Vital signs 8/13@1223    GI bleeding documented      Acute bleeding (Non GI site)      Transfusion(s)     x Acute/Chronic illness Spinal stenosis in cervical region  CHF  HTN Brief OP note 8/11  Cardiology PN 8/12  Cardiology PN 8/13    Treatments     x Other Procedure:   1.  Posterior cervical fusion C3-4  2.  Additional level posterior cervical fusion C4-5  3.  Additional level posterior cervical fusion C5-6  4.  Additional level posterior cervical fusion C6-7  5.  Additional posterior cervical fusion C7-T1  6.  Posterior segmental instrumentation C3 through T1  7.  Laminectomy greater than 2 vertebral segments for cervical myelopathy C3-C7  8.  Autograft same incision local bone     EBL: 1050 mL Brief OP note 8/11     Provider, please specify diagnosis or diagnoses associated with above clinical findings.   [   ] Acute blood loss anemia    [ X  ] Acute blood loss anemia expected post-operatively    [   ] Anemia, unspecified    [   ] Other Hematological Diagnosis (please specify): _________________   [   ] Clinically Undetermined     Present on admission (POA) status:   [   ] Yes (Y)                           [  ] Clinically Undetermined (W)  [ n] No (N)                            [   ] Documentation insufficient to determine if condition is POA (U)          Please document in your progress notes daily for the duration of treatment, until resolved, and include in your discharge summary.

## 2020-08-13 NOTE — PHYSICIAN QUERY
"PT Name: Devin oLpez  MR #: [id number]    Physician Query Form - Heart  Condition Clarification     CDS/: Urmila Mosqueda RN               Contact information:[email or web address]  This form is a permanent document in the medical record.     Query Date: August 13, 2020    By submitting this query, we are merely seeking further clarification of documentation. Please utilize your independent clinical judgment when addressing the question(s) below.    The medical record contains the following   Indicators     Supporting Clinical Findings Location in Medical Record   x BNP No BNP obtained this admission labs   x EF He had a Bentall procedure in April 2019, the aortic valve root was replaced with a Medtronic Mosaic bioprosthesis with aortic root repair for aortic insufficiency and congestive heart failure.  After surgery his ejection fraction improved from 35% to 45% (by echo in October 2019) Cardiology consult   x Radiology findings No imaging done this admission     Echo Results      "Ascites" documented      "SOB" or "CERVANTES" documented      "Hypoxia" documented     x Heart Failure documented congestive heart failure.  Cardiology consult   x "Edema" documented No edema Cardiology PN 8/12   x Diuretics/Meds losartan (COZAAR) 50 MG   metoprolol succinate (TOPROL-XL) 25 MG 24 hr  Cardiology consult PTA medications    Treatment:      Other:      Heart failure (HF) can be acute, chronic or both. It is generally further specificed as systolic, diastolic, or combined. Lastly, it is important to identify an underlying etiology if known or suspected.     Common clues to acute exacerbation:  Rapidly progressive symptoms (w/in 2 weeks of presentation), using IV diuretics to treat, using supplemental O2, pulmonary edema on Xray, MI w/in 4 weeks, and/or BNP >500    Systolic Heart Failure: is defined as chart documentation of a left ventricular ejection fraction (LVEF) less than 40%     Diastolic Heart Failure: is " defined as a left ventricular ejection fraction (LVEF) greater than 40%   +      Evidence of diastolic dysfunction on echocardiography OR    Right heart catheterization wedge pressure above 12 mm Hg OR    Left heart catheterization left ventricular end diastolic pressure 18 mm Hg or above.    References: *American Heart Association    The clinical guidelines noted below are only system guidelines, and do not replace the providers clinical judgment.     Provider, please specify the diagnosis associated with above clinical findings  [ x  ] Chronic Systolic Heart Failure - Pre-existing systolic HF diagnosis.  EF < 40%  without  acute HF symptoms documented    [   ] Other Type of Heart Failure (please specify type):   [   ] Heart Failure Ruled Out   [   ] Other (please specify):   [  ] Clinically Undetermined                           Please document in your progress notes daily for the duration of treatment until resolved and include in your discharge summary.

## 2020-08-13 NOTE — PLAN OF CARE
Problem: Occupational Therapy Goal  Goal: Occupational Therapy Goal  Description: Goals to be met by: 8/14/20    Patient will increase functional independence with ADLs by performing:    SBA level with UB dressing.  SBA level with LB  dressing.  SBA level with toileting.  SBA level with toilet transfers.  Mod I level with log rolling/supine<>sit for participation in self care tasks.  Caregiver will don/doff pt's [place] collar at Indep level adhering to pt's cervical spine precautions.   Pt will verbalize and demonstrate understanding of cervical spine precautions and how to adhere to precautions during ADLs and ADL mobility.     Outcome: Ongoing, Progressing   Progressing towards goals.  Pt to benefit from continued acute care OT services to increase independence in self-care/functional transfers.  Continue POC.  Recommend discharge to home with assistance and Home Health OT and PT.

## 2020-08-13 NOTE — PT/OT/SLP PROGRESS
Physical Therapy      Patient Name:  Devin Lopez   MRN:  [id number]    Patient not seen in PM secondary to back in bed now, and pt reports he just recv'd a muscle relaxer and would like to sleep. Will follow-up in AM.    Dinah Campo, PTA

## 2020-08-13 NOTE — PLAN OF CARE
"  Problem: Physical Therapy Goal  Goal: Physical Therapy Goal  Description: Goals to be met by :2020    Patient will increase functional independence with mobility by performin. Supine to sit with supervision.   2. Sit to supine with supervision.   3. Sit<>stand transfer with supervision using LRAD  4. Gait > 150 feet with SBA using LRAD  5. Ascend/descend *4" curb step with SBA   Outcome: Ongoing, Progressing   Pt sit to stand SBA, amb'd ~150' with RW and CGA, ~50' without RW with CGA/HHA. HHPT and assistance from wife.  "

## 2020-08-13 NOTE — PROGRESS NOTES
Afebrile vital signs stable  Patient is progressing well with therapy and pain is well controlled, drain 120 last shift  5/5 power bilateral upper extremities with intact sensation  Status post posterior cervical decompression and fusion  1.  Continue PT with plans for home health  2.  SCDs and Jad  3.  Continue current pain regimen

## 2020-08-13 NOTE — PLAN OF CARE
VSS and afebrile, AAOx4. Dressing is CDI, pain controlled by PRN pain meds, tolerated well. Regular diet tolerated well. Pt moving around well. No complaints at this time. Plan of care reviewed with patient. Purposeful rounding done, call light at bed side, bed at lowest position, brakes on, non-skid socks on, will continue to monitor.

## 2020-08-13 NOTE — PROGRESS NOTES
Cardiology  Progress Notes            Subjective:  Sitting up out of bed, says he had a good night, feels well this morning.    Day 2 after cervical decompression and fusion.    Had a Bentall procedure in April 2019 for aortic insufficiency and congestive heart failure, the last ejection fraction by echocardiography was 45% in October 2019.      Vital Signs:  Vitals:    08/13/20 0025 08/13/20 0450 08/13/20 0735 08/13/20 0743   BP:  (!) 166/84 136/75    BP Location:  Right arm Left arm    Patient Position:  Lying Sitting    Pulse:  80 (!) 58    Resp: 16 18 18 18   Temp:  99.5 °F (37.5 °C) 98.8 °F (37.1 °C)    TempSrc:  Oral Oral    SpO2:  (!) 94% 95%    Weight:       Height:           Physical Exam:  Chest clear  Heart regular.  Basal ejection systolic murmur conducted to the carotids.  No gallop.  Extremities are warm.  Grossly neurologically intact.    Laboratory:  CBC:   Recent Labs   Lab 08/12/20  0530   WBC 9.43   RBC 2.67*   HGB 8.7*   HCT 25.7*   PLT 80*   MCV 96   MCH 32.6*   MCHC 33.9     BMP:   Recent Labs   Lab 08/11/20  2204   *      K 4.3      CO2 23   BUN 11   CREATININE 0.8   CALCIUM 8.4*     CMP:   Recent Labs   Lab 08/11/20  2204   *   CALCIUM 8.4*      K 4.3   CO2 23      BUN 11   CREATININE 0.8     LFTs: No results for input(s): ALT, AST, ALKPHOS, BILITOT, PROT, ALBUMIN in the last 168 hours.  Coagulation:   Recent Labs   Lab 08/06/20  1315   INR 1.0   APTT 27.7     Cardiac markers: No results for input(s): CKMB, CPKMB, TROPONINT, TROPONINI, MYOGLOBIN in the last 168 hours.    Imaging:  SURG FL Surgery Fluoro Usage   Final Result            Problems:   1. Stable day 2 post cervical laminectomy and fusion.  2. Status post Bentall procedure for aortic insufficiency and congestive heart failure.  3. Essential hypertension.        Plan of Care:  Would continue losartan 100 mg daily, metoprolol succinate 50 mg daily  Anticipates discharge tomorrow.        Jarred  Agnes

## 2020-08-13 NOTE — PT/OT/SLP PROGRESS
"Occupational Therapy   Treatment    Name: Dvein Lopez  MRN: [id number]  Admitting Diagnosis:  Cervical disc disorder at C6-C7 level with myelopathy  2 Days Post-Op    Recommendations:     Discharge Recommendations: home health OT, home health PT(Assist from wife)  Discharge Equipment Recommendations:  (TBD)  Barriers to discharge:  None    Assessment:     Devin Lopez is a 60 y.o. male with a medical diagnosis of Cervical disc disorder at C6-C7 level with myelopathy.  He presents seated in chair and agreeable to OT tx session.    Performance deficits affecting function are weakness, impaired endurance, impaired self care skills, impaired functional mobilty, impaired balance, gait instability, orthopedic precautions, impaired skin, pain, decreased safety awareness, decreased lower extremity function, decreased upper extremity function, decreased ROM, impaired cardiopulmonary response to activity.   Recommend discharge to home with assist of wife and Home Health OT and PT.      Rehab Prognosis:  Good; patient would benefit from acute skilled OT services to address these deficits and reach maximum level of function.       Plan:     Patient to be seen daily to address the above listed problems via self-care/home management, therapeutic activities, therapeutic exercises  · Plan of Care Expires: 09/11/20  · Plan of Care Reviewed with: patient    Subjective     Pain/Comfort:  · Pain Rating 1: (Pt not rating pain after education on pain identification.  Pt did state that he has some burning sensation in his neck and  once OT explained what a muscle spasm was, pt stating "I think that is what it is".)  · Location - Side 1: Bilateral  · Location - Orientation 1: posterior  · Location 1: cervical spine  · Pain Addressed 1: Pre-medicate for activity, Reposition, Distraction, Cessation of Activity, Nurse notified  · Pain Rating Post-Intervention 1: (Pt did not rate pain.  Pt did state that his pain is better than " yesterday.)    Objective:     Communicated with: nurseSylvain, prior to session.  Patient found up in chair with peripheral IV upon OT entry to room.    General Precautions: Standard, fall   Orthopedic Precautions:spinal precautions(cervical)   Braces: Aspen collar     Occupational Performance:     First Tx Session:    Activities of Daily Living:  · Feeding: Pt is right hand dominant and able to use right UE for feeding and drinking.  Pt is having severe pain when reaching forward or elevating his left UE for feeding.  Pt denying any problems when swallowing food or liquids.  · Lower Body Dressing: SBA/Set up for socks  · Pt had UB and LB clothing in room but declined dressing today.  · Toileting:  Pt stating he is using a urinal without any difficulty instead of calling for assistance to go to the bathroom.  Pt verbalizing understanding that staff is wanting pt to call for assistance using call button when needing to get up and not walk/stand without staff present.    Bilateral UE A/AAROM:  Pt able to perform right shoulder AROM 0-90 degrees flexion/extension without assist.  Pt having immediate increase in pain on left side of neck and left shoulder when attempting to lift left UE.  Pt performed left shoulder AAROM 0-90 degrees flexion/abduction with decreased pain.    Pt educated on safety with UE movements and muscle spasms.  Pt reporting that he thinks he is having muscle spasms in his neck muscles, especially effecting left side of neck and left shoulder.  NurseSylvain notified.    Pt stating he feels best when he pushes the front of the Aspen collar posteriorly, making the collar feel more snug.  Pt stating [place] collar felt better when it was tightened by OT.    Pt left sitting up in chair with Aspen collar, call button in reach and all lines intact.  Pt's nurseSylvain notified of pt stating he feels like he is having muscle spasms.    Second Tx Session:    Bed Mobility:    Sit to supine:  CGA with HOB  elevated 30 degrees.    Functional Mobility:    Sit to stand requiring Min A with RW.   Pt needing cues for safety using RW for ambulation to bathroom for self care tasks.  Pt educated on standing with feet shoulder width apart when performing grooming tasks at sink.  Pt needing cues for correct placement of RW when standing at sink.      Activities of Daily Living:    Grooming standing at sink:  SBA level.  Pt reporting pain with all left UE movements, even when minimally stretching his left hand forward along the bathroom countertop to  grooming items.  Pt able to reach to right side and left side with right UE without increased pain.  Pt stands with forward trunk flexion with pt given cues to increase upright posture.  Pt stating at this time, he is unable to tolerate standing more upright.    Pt left supine with HOB elevated, Aspen collar, call button in reach and nurseSylvain notified.  [facility] 6 Click ADL: 18        GOALS:   Multidisciplinary Problems     Occupational Therapy Goals        Problem: Occupational Therapy Goal    Goal Priority Disciplines Outcome Interventions   Occupational Therapy Goal     OT, PT/OT Ongoing, Progressing    Description: Goals to be met by: 8/14/20    Patient will increase functional independence with ADLs by performing:    SBA level with UB dressing.  SBA level with LB  dressing.  SBA level with toileting.  SBA level with toilet transfers.  Mod I level with log rolling/supine<>sit for participation in self care tasks.  Caregiver will don/doff pt's [place] collar at Indep level adhering to pt's cervical spine precautions.   Pt will verbalize and demonstrate understanding of cervical spine precautions and how to adhere to precautions during ADLs and ADL mobility.                      Time Tracking:     OT Date of Treatment: 08/13/20     OT Start Time: 1128  OT Stop Time: 1144  OT Total Time (min): 16 min     OT Start Time: 1357  OT Stop Time: 1410  OT Total Time (min): 13  min    Billable Minutes:Self Care/Home Management 29    SERGIO Landaverde  8/13/2020

## 2020-08-13 NOTE — PT/OT/SLP PROGRESS
Physical Therapy      Patient Name:  Devin Lopez   MRN:  [id number]    Patient not seen in AM secondary to pt up in chair already, groggy from pain meds. Will follow-up in PM.    Dinah Campo PTA

## 2020-08-13 NOTE — PT/OT/SLP PROGRESS
Physical Therapy Treatment    Patient Name:  Devin Lopez   MRN:  [id number]    Recommendations:     Discharge Recommendations:  home health PT, home health OT(and assistance from wife as needed)   Discharge Equipment Recommendations: walker, rolling   Barriers to discharge: None    Assessment:     Devin Lopez is a 60 y.o. male admitted with a medical diagnosis of Cervical disc disorder at C6-C7 level with myelopathy.  He presents with the following impairments/functional limitations:  weakness, impaired endurance, impaired self care skills, impaired functional mobilty, gait instability, decreased lower extremity function, decreased upper extremity function, pain, decreased ROM, impaired skin, edema, orthopedic precautions ;pt with good mobility today with RW, though somewhat unsteady when ambulating without it.    Rehab Prognosis: Good; patient would benefit from acute skilled PT services to address these deficits and reach maximum level of function.    Recent Surgery: Procedure(s) (LRB):  LAMINECTOMY, SPINE, CERVICAL, WITH POSTERIOR FUSION FROM C3-T1 (N/A)  FUSION, SPINE, CERVICAL C3-T1 (N/A)  BONE GRAFT (N/A)  FUSION, SPINE, WITH INSTRUMENTATION (N/A) 2 Days Post-Op    Plan:     During this hospitalization, patient to be seen BID to address the identified rehab impairments via gait training, therapeutic activities, therapeutic exercises and progress toward the following goals:    · Plan of Care Expires:  09/10/20    Subjective     Chief Complaint: pt reports neck stiffness/soreness mostly  Patient/Family Comments/goals: pt agreeable to amb.  Pain/Comfort:  · Pain Rating 1: 8/10  · Location - Orientation 1: posterior  · Location 1: cervical spine  · Pain Addressed 1: Pre-medicate for activity, Reposition, Distraction  · Pain Rating Post-Intervention 1: 8/10      Objective:     Communicated with nurse prior to session.  Patient found up in chair with peripheral IV upon PT entry to room.     General Precautions:  "Standard, fall   Orthopedic Precautions:spinal precautions(cervical)   Braces: Aspen collar     Functional Mobility:  · Transfers:     · Sit to Stand:  stand by assistance and contact guard assistance with rolling walker  · Gait: amb'd ~150' with RW and CGA, ~50' without RW with CGA/HHA      AM-PAC 6 CLICK MOBILITY  Turning over in bed (including adjusting bedclothes, sheets and blankets)?: 3  Sitting down on and standing up from a chair with arms (e.g., wheelchair, bedside commode, etc.): 3  Moving from lying on back to sitting on the side of the bed?: 3  Moving to and from a bed to a chair (including a wheelchair)?: 3  Need to walk in hospital room?: 3  Climbing 3-5 steps with a railing?: 3  Basic Mobility Total Score: 18       Therapeutic Activities and Exercises:   perf'd seated LE ex's of AP's, LAQ's x 10 ea.     Patient left up in chair with all lines intact, call button in reach, nurse notified and LE's elevated..    GOALS:   Multidisciplinary Problems     Physical Therapy Goals        Problem: Physical Therapy Goal    Goal Priority Disciplines Outcome Goal Variances Interventions   Physical Therapy Goal     PT, PT/OT Ongoing, Progressing     Description: Goals to be met by :2020    Patient will increase functional independence with mobility by performin. Supine to sit with supervision.   2. Sit to supine with supervision.   3. Sit<>stand transfer with supervision using LRAD  4. Gait > 150 feet with SBA using LRAD  5. Ascend/descend *4" curb step with SBA                    Time Tracking:     PT Received On: 20  PT Start Time: 1255     PT Stop Time: 1310  PT Total Time (min): 15 min     Billable Minutes: Gait Training 15    Treatment Type: Treatment  PT/PTA: PTA     PTA Visit Number: 2     Dinah Campo, PTA  2020  "

## 2020-08-14 VITALS
OXYGEN SATURATION: 97 % | RESPIRATION RATE: 20 BRPM | HEIGHT: 71 IN | BODY MASS INDEX: 22.18 KG/M2 | WEIGHT: 158.44 LBS | SYSTOLIC BLOOD PRESSURE: 99 MMHG | HEART RATE: 71 BPM | TEMPERATURE: 98 F | DIASTOLIC BLOOD PRESSURE: 59 MMHG

## 2020-08-14 PROCEDURE — 63600175 PHARM REV CODE 636 W HCPCS: Performed by: HOSPITALIST

## 2020-08-14 PROCEDURE — 25000003 PHARM REV CODE 250: Performed by: ORTHOPAEDIC SURGERY

## 2020-08-14 PROCEDURE — 25000003 PHARM REV CODE 250: Performed by: INTERNAL MEDICINE

## 2020-08-14 PROCEDURE — 97535 SELF CARE MNGMENT TRAINING: CPT

## 2020-08-14 PROCEDURE — 25000003 PHARM REV CODE 250: Performed by: HOSPITALIST

## 2020-08-14 PROCEDURE — 97116 GAIT TRAINING THERAPY: CPT | Mod: CQ

## 2020-08-14 RX ADMIN — OXYCODONE HYDROCHLORIDE 5 MG: 5 TABLET ORAL at 11:08

## 2020-08-14 RX ADMIN — POLYETHYLENE GLYCOL 3350 17 G: 17 POWDER, FOR SOLUTION ORAL at 08:08

## 2020-08-14 RX ADMIN — HYDROMORPHONE HYDROCHLORIDE 0.5 MG: 2 INJECTION, SOLUTION INTRAMUSCULAR; INTRAVENOUS; SUBCUTANEOUS at 08:08

## 2020-08-14 RX ADMIN — ATORVASTATIN CALCIUM 40 MG: 20 TABLET, FILM COATED ORAL at 08:08

## 2020-08-14 RX ADMIN — METOPROLOL SUCCINATE 50 MG: 50 TABLET, EXTENDED RELEASE ORAL at 09:08

## 2020-08-14 RX ADMIN — PANTOPRAZOLE SODIUM 40 MG: 40 TABLET, DELAYED RELEASE ORAL at 08:08

## 2020-08-14 RX ADMIN — CYCLOBENZAPRINE 10 MG: 10 TABLET, FILM COATED ORAL at 09:08

## 2020-08-14 RX ADMIN — MUPIROCIN: 20 OINTMENT TOPICAL at 08:08

## 2020-08-14 RX ADMIN — OXYCODONE HYDROCHLORIDE 5 MG: 5 TABLET ORAL at 05:08

## 2020-08-14 RX ADMIN — DOCUSATE SODIUM 50 MG AND SENNOSIDES 8.6 MG 1 TABLET: 8.6; 5 TABLET, FILM COATED ORAL at 08:08

## 2020-08-14 RX ADMIN — LOSARTAN POTASSIUM 100 MG: 50 TABLET ORAL at 08:08

## 2020-08-14 NOTE — PT/OT/SLP PROGRESS
Occupational Therapy   Treatment    Name: Devin Lopez  MRN: [id number]  Admitting Diagnosis:  Cervical disc disorder at C6-C7 level with myelopathy  3 Days Post-Op    Recommendations:     Discharge Recommendations: home health PT, home health OT(Assist from wife)  Discharge Equipment Recommendations:  walker, rolling  Barriers to discharge:  None    Assessment:     Devin Lopez is a 60 y.o. male with a medical diagnosis of Cervical disc disorder at C6-C7 level with myelopathy.  He presents walking down the hallway with PTA, Baillie, and agreeable to OT tx session.  Pt is performing dressing, toileting and grooming tasks at [facility] adhering to cervical spinal precautions.  Pt reporting his wife was coming to his room today for caregiver training as OT requested but on OT's 3rd attempt to check to see if pt's wife had arrived for caregiver training, pt told OT that his wife was not coming up to his room.  Pt had been educated several times that caregiver training was a part of his POC prior to discharge. Performance deficits affecting function are weakness, impaired endurance, impaired self care skills, impaired functional mobilty, gait instability, pain, impaired skin, orthopedic precautions, decreased ROM, decreased upper extremity function, decreased safety awareness.   Recommend discharge to home with assist of wife and Home Health OT and PT.    Rehab Prognosis:  Good; patient would benefit from acute skilled OT services to address these deficits and reach maximum level of function.       Plan:     Patient to be seen daily to address the above listed problems via self-care/home management, therapeutic activities, therapeutic exercises  · Plan of Care Expires: 09/11/20  · Plan of Care Reviewed with: patient    Subjective     Pain/Comfort:  · Pain Rating 1: (Pt did not give OT a number to rate his pain.  Pt stating his neck feels better than yesterday but never would rate pain level.)  · Location - Side 1:  Bilateral  · Location - Orientation 1: posterior  · Location 1: cervical spine  · Pain Addressed 1: Pre-medicate for activity, Reposition, Distraction, Cessation of Activity  · Pain Rating Post-Intervention 1: (Pt did not rate pain even though he was asked several times.)    Objective:     Communicated with: Sylvain valderrama, prior to session.  Patient found up in chair with peripheral IV upon OT entry to room.    General Precautions: Standard, fall   Orthopedic Precautions:spinal precautions(Cervical)   Braces: Aspen collar     Occupational Performance:       Functional Mobility/Transfers:  · Patient completed Sit <> Stand Transfer with stand by assistance  with  rolling walker   · Patient completed Bed <> Chair/Toilet Transfer using Step Transfer technique with stand by assistance with rolling walker  · Functional Mobility: Pt without LOB during ADLs      Activities of Daily Living:  · Feeding:  [place]; Pt reports he is no longer having difficulty incorporating left UE into feeding tasks.  Pt able to perform 0-90 degrees left shoulder flexion and abduction AROM.  · Upper Body Dressing: Set up/SBA; pt instructed in adaptive techniques for adhering to cervical spinal precautions  · Lower Body Dressing: Set up/SBA; pt able to use adaptive techniques for adhering to cervical spinal precautions  · Toileting: Indep with use of urinal; SBA for using toilet      AMPAC 6 Click ADL: 19    Treatment & Education:  Role of OT, POC, need for caregiver education/training on [place] collar, cervical spinal precautions, safety strategies to reduce fall risk    Patient left up in chair with all lines intact, call button in reach and nurseSylvain notifiedEducation:      OT attempted caregiver training three times today.  On last attempt, pt stating that his wife was not coming up to his room and that she is just picking him up in the parking garage.      GOALS:   Multidisciplinary Problems     Occupational Therapy Goals         Problem: Occupational Therapy Goal    Goal Priority Disciplines Outcome Interventions   Occupational Therapy Goal     OT, PT/OT Ongoing, Progressing    Description: Goals to be met by: 8/14/20    Patient will increase functional independence with ADLs by performing:    SBA level with UB dressing.  MET  SBA level with LB  Dressing.  MET  SBA level with toileting.  MET  SBA level with toilet transfers.  MET  Mod I level with log rolling/supine<>sit for participation in self care tasks.  Caregiver will don/doff pt's [place] collar at Indep level adhering to pt's cervical spine precautions.   Pt will verbalize and demonstrate understanding of cervical spine precautions and how to adhere to precautions during ADLs and ADL mobility.  MET                     Time Tracking:     OT Date of Treatment: 08/14/20  OT Start Time: 1000  OT Stop Time: 1012  OT Total Time (min): 12 min    Billable Minutes:Self Care/Home Management 12    ESRGIO Landaverde  8/14/2020

## 2020-08-14 NOTE — PROGRESS NOTES
Afebrile vital signs stable  Pain is well controlled  Incision clean dry and intact, drain removed  5/5 power bilateral upper extremities with intact sensation  Status post posterior cervical decompression and fusion  Discharge home

## 2020-08-14 NOTE — PLAN OF CARE
"  Problem: Physical Therapy Goal  Goal: Physical Therapy Goal  Description: Goals to be met by :2020    Patient will increase functional independence with mobility by performin. Supine to sit with supervision.   2. Sit to supine with supervision.   3. Sit<>stand transfer with supervision using LRAD MET 2020  4. Gait > 150 feet with SBA using LRAD MET 2020  5. Ascend/descend *4" curb step with SBA   Outcome: Ongoing, Progressing   Sit to stand with RW supervision for safety. Pt ambulated 150ft in gal[place] with RW & supervision for safety- pt performed 180 degree turn during gait with supervision. Stand to sit supervision with cues for technique. Pt able to verbally review LE TE.  "

## 2020-08-14 NOTE — PROGRESS NOTES
Cardiology  Progress Notes            Subjective: Feels well, had a good night. No pain.    Post op cervical decompression sx.  Past H/O Bentall procedure for Aortic insufficiency and heart failure in April 2019.    Vital Signs:  Vitals:    08/13/20 2347 08/14/20 0518 08/14/20 0703 08/14/20 0831   BP:  117/80 131/79    BP Location:  Right arm Right arm    Patient Position:  Lying Lying    Pulse:  80 84    Resp: 17 18 18 18   Temp:  99 °F (37.2 °C) 98.7 °F (37.1 °C)    TempSrc:  Oral Oral    SpO2:  96% 95%    Weight:       Height:           Physical Exam:   Chest clear  Cor: Basal ejection systolic murmur. No gallop  Ext: warm.  Laboratory:  CBC:   Recent Labs   Lab 08/12/20  0530   WBC 9.43   RBC 2.67*   HGB 8.7*   HCT 25.7*   PLT 80*   MCV 96   MCH 32.6*   MCHC 33.9     BMP:   Recent Labs   Lab 08/11/20  2204   *      K 4.3      CO2 23   BUN 11   CREATININE 0.8   CALCIUM 8.4*     CMP:   Recent Labs   Lab 08/11/20  2204   *   CALCIUM 8.4*      K 4.3   CO2 23      BUN 11   CREATININE 0.8     LFTs: No results for input(s): ALT, AST, ALKPHOS, BILITOT, PROT, ALBUMIN in the last 168 hours.  Coagulation: No results for input(s): PT, INR, APTT in the last 168 hours.  Cardiac markers: No results for input(s): CKMB, CPKMB, TROPONINT, TROPONINI, MYOGLOBIN in the last 168 hours.    Imaging:  SURG FL Surgery Fluoro Usage   Final Result            Problems:   1. Doing well after cervical decompression surgery  2. Stable post Bentall procedure for AI and CHF.        Plan of Care: See Orders          Jarred Alarcon

## 2020-08-14 NOTE — DISCHARGE SUMMARY
[facility]  Discharge Summary      Admit Date: 8/11/2020    Discharge Date and Time:  08/14/2020 6:09 AM    Attending Physician: Gus Ryan MD     Reason for Admission:  Cervical myelopathy    Procedures Performed: Procedure(s) (LRB):  LAMINECTOMY, SPINE, CERVICAL, WITH POSTERIOR FUSION FROM C3-T1 (N/A)  FUSION, SPINE, CERVICAL C3-T1 (N/A)  BONE GRAFT (N/A)  FUSION, SPINE, WITH INSTRUMENTATION (N/A)    Hospital Course (synopsis of major diagnoses, care, treatment, and services provided during the course of the hospital stay):  Patient was admitted on 08/11/2020 at which time he underwent posterior cervical decompression and fusion.  He tolerated the procedure well and was transferred to the intensive care unit postoperatively where he was stable.  Day 1 he was transferred to the floor.  He progressed very well with therapy.  His pain was well controlled.  He had no medical complications during his stay.  Day 3 is drainage removed and he was deemed stable and ready for discharge.      Consults: cardiology    Significant Diagnostic Studies:  None    Final Diagnoses:    Principal Problem: Cervical disc disorder at C6-C7 level with myelopathy   Secondary Diagnoses:   Active Hospital Problems    Diagnosis  POA    *Cervical disc disorder at C6-C7 level with myelopathy [M50.023]  Unknown    Preop testing [Z01.818]  Not Applicable    Lumbosacral stenosis [M48.07]  Yes    Degeneration of cervical intervertebral disc [M50.30]  Unknown    Spinal stenosis in cervical region [M48.02]  Unknown      Resolved Hospital Problems   No resolved problems to display.       Discharged Condition: good    Disposition: Home or Self Care    Follow Up/Patient Instructions:     Medications:  Reconciled Home Medications:      Medication List      START taking these medications    cyclobenzaprine 10 MG tablet  Commonly known as: FLEXERIL  Take 1 tablet (10 mg total) by mouth 3 (three) times daily as needed for Muscle  spasms.     oxyCODONE-acetaminophen  mg per tablet  Commonly known as: PERCOCET  Take 1 tablet by mouth every 4 (four) hours as needed for Pain.        CHANGE how you take these medications    aspirin 325 MG tablet  Take 1 tablet (325 mg total) by mouth once daily.  What changed: additional instructions        CONTINUE taking these medications    atorvastatin 40 MG tablet  Commonly known as: LIPITOR  Take 1 tablet (40 mg total) by mouth once daily.     losartan 50 MG tablet  Commonly known as: COZAAR  Take 1 tablet (50 mg total) by mouth once daily.     metoprolol succinate 25 MG 24 hr tablet  Commonly known as: TOPROL-XL  TAKE 1 TABLET(25 MG) BY MOUTH EVERY DAY     multivitamin capsule  Take 1 capsule by mouth once daily.     pantoprazole 40 MG tablet  Commonly known as: PROTONIX  Take 1 tablet (40 mg total) by mouth once daily.          Discharge Procedure Orders   COVID-19 Routine Screening   Standing Status: Future Number of Occurrences: 1 Standing Exp. Date: 10/04/21   Order Comments: Stat for surgery 8/11     Order Specific Question Answer Comments   Is the patient symptomatic? No    Is this needed for pre-procedure or pre-op testing? Yes    Diagnosis: Preop testing [461472]      Diet Adult Regular     Other restrictions (specify):   Order Comments: Maintain cervical collar.  Patient must keep wound clean dry and covered.  This is until he is seen back in the office in 2 weeks.     Leave dressing on - Keep it clean, dry, and intact until clinic visit     Follow-up Information     [facility].    Why: they will call you to schedule first appointment  Contact information:  [address]  [facility] [address]  [phone]

## 2020-08-14 NOTE — PLAN OF CARE
No significant events overnight. Remains free from fall, injury, skin breakdown. VSS on RA throughout the night. Positions self-independently. Pain controled w/ PO/IV meds. Hemovac w/ bloody drainage. C-collar in place. DSG CDI. HOB elevated. All alarms active and auduble. TEDs/SCDs in place. Plan of care reviewed w/ pt and all questions answered. Bed locked and in lowest position. Call light w/I reach. No needs at this time. Purposeful hourly rounding. Will continue to monitor.

## 2020-08-14 NOTE — PLAN OF CARE
Patient will discharge home with home health     Patient was accepted by [facility] this week and CM received a call and was advised that they wouldn't be able to accept the patient  Because agency doesn't accept his insurance  CM sent referral to additional  referrals     [id number] Patient accepted by [facility] rolling walker,approval from [facility]asmine, [facility] delivered DME     Family will transport patient home           08/14/20 1022   Final Note   Assessment Type Final Discharge Note   Anticipated Discharge Disposition Home-Health   What phone number can be called within the next 1-3 days to see how you are doing after discharge? [phone]   Hospital Follow Up  Appt(s) scheduled? No   Discharge plans and expectations educations in teach back method with documentation complete? Yes

## 2020-08-14 NOTE — PT/OT/SLP PROGRESS
Physical Therapy Treatment    Patient Name:  Devin Lopez   MRN:  [id number]    Recommendations:     Discharge Recommendations:  home health PT, home health OT(and assistance from wife as needed)   Discharge Equipment Recommendations: walker, rolling   Barriers to discharge: None    Assessment:     Devin Lopez is a 60 y.o. male admitted with a medical diagnosis of Cervical disc disorder at C6-C7 level with myelopathy.  He presents with the following impairments/functional limitations:  weakness, impaired balance, decreased safety awareness, impaired self care skills, impaired functional mobilty, gait instability .    Sit to stand with RW supervision for safety. Pt ambulated 150ft in galindo with RW & supervision for safety- pt performed 180 degree turn during gait with supervision. Stand to sit supervision with cues for technique. Pt able to verbally review LE TE.    Rehab Prognosis: Good; patient would benefit from acute skilled PT services to address these deficits and reach maximum level of function.    Recent Surgery: Procedure(s) (LRB):  LAMINECTOMY, SPINE, CERVICAL, WITH POSTERIOR FUSION FROM C3-T1 (N/A)  FUSION, SPINE, CERVICAL C3-T1 (N/A)  BONE GRAFT (N/A)  FUSION, SPINE, WITH INSTRUMENTATION (N/A) 3 Days Post-Op    Plan:     During this hospitalization, patient to be seen BID to address the identified rehab impairments via gait training, therapeutic activities, therapeutic exercises and progress toward the following goals:    · Plan of Care Expires:  09/10/20    Subjective     Chief Complaint: sharp pain in neck with standing  Patient/Family Comments/goals: pt hopeful to d/c home by lunch  Pain/Comfort:  · Pain Rating 1: 8/10  · Location - Orientation 1: posterior  · Location 1: cervical spine  · Pain Addressed 1: Pre-medicate for activity, Reposition, Distraction, Cessation of Activity      Objective:     Communicated with nurse Schaeffer prior to session.  Patient found up in chair with peripheral IV upon  "PT entry to room.     General Precautions: Standard, fall   Orthopedic Precautions:spinal precautions(cervical)   Braces:       Functional Mobility:  · Transfers- Sit to stand with RW supervision for safety.  Stand to sit supervision with cues for technique.  · Gait- Pt ambulated 150ft in galindo with RW & supervision for safety- pt performed 180 degree turn during gait with supervision. Increased B knee flexion during gait with decreased B heel strike- corrected some with verbal cues.      AM-PAC 6 CLICK MOBILITY  Turning over in bed (including adjusting bedclothes, sheets and blankets)?: 3  Sitting down on and standing up from a chair with arms (e.g., wheelchair, bedside commode, etc.): 3  Moving from lying on back to sitting on the side of the bed?: 3  Moving to and from a bed to a chair (including a wheelchair)?: 3  Need to walk in hospital room?: 3  Climbing 3-5 steps with a railing?: 3  Basic Mobility Total Score: 18       Therapeutic Activities and Exercises:   Verbally reviewed LE TE    Patient left up in chair with all lines intact, call button in reach and OT Mary present..    GOALS:   Multidisciplinary Problems     Physical Therapy Goals        Problem: Physical Therapy Goal    Goal Priority Disciplines Outcome Goal Variances Interventions   Physical Therapy Goal     PT, PT/OT Ongoing, Progressing     Description: Goals to be met by :2020    Patient will increase functional independence with mobility by performin. Supine to sit with supervision.   2. Sit to supine with supervision.   3. Sit<>stand transfer with supervision using LRAD  4. Gait > 150 feet with SBA using LRAD  5. Ascend/descend *4" curb step with SBA                    Time Tracking:     PT Received On: 20  PT Start Time: 953     PT Stop Time:   PT Total Time (min): 15 min     Billable Minutes: Gait Training 15    Treatment Type: Treatment  PT/PTA: PTA     PTA Visit Number: 3     Massiel Estrada PTA  2020  "

## 2020-08-14 NOTE — PLAN OF CARE
Problem: Occupational Therapy Goal  Goal: Occupational Therapy Goal  Description: Goals to be met by: 8/14/20    Patient will increase functional independence with ADLs by performing:    SBA level with UB dressing.  SBA level with LB  dressing.  SBA level with toileting.  SBA level with toilet transfers.  Mod I level with log rolling/supine<>sit for participation in self care tasks.  Caregiver will don/doff pt's Tate collar at Indep level adhering to pt's cervical spine precautions.   Pt will verbalize and demonstrate understanding of cervical spine precautions and how to adhere to precautions during ADLs and ADL mobility.     Outcome: Ongoing, Progressing   Progressing towards goals.  Pt to benefit from continued acute care OT services to increase independence in self-care/functional transfers.  Continue POC.   Pt was educated on need for wife to attend OT tx session prior to discharge each OT session for caregiver training but pt's wife did not come to pt's room prior to pt discharging.    Pt was aware that caregiver training on Tate collar was needed prior to discharge.  Recommend discharge to home with assist of wife and Home Health OT and PT.

## 2020-08-14 NOTE — PLAN OF CARE
Free from falls, injury, or skin breakdown this hospital admission.  Pt eager & in agreement w/ DC. VU of DC instructions and the need to attend follow-up appointment--paperwork  passed & explained. Scripts filled and delivered to bedside. IV removed w/ cath tip intact, WNL. Voiding, ambulating, & tolerating PO well. Incision WNL. C-collar on.To be DCd home w/ family--will be escorted downstairs via  transport team once dressed, ready & ride arrives. Pt discharged in no distress w/ DME for home use.

## 2020-11-27 ENCOUNTER — LAB VISIT (OUTPATIENT)
Dept: LAB | Facility: OTHER | Age: 60
End: 2020-11-27
Payer: COMMERCIAL

## 2020-11-27 DIAGNOSIS — D69.6 THROMBOCYTOPENIA, UNSPECIFIED: Primary | ICD-10-CM

## 2020-11-27 LAB
ALBUMIN SERPL BCP-MCNC: 4.1 G/DL (ref 3.5–5.2)
ALP SERPL-CCNC: 85 U/L (ref 55–135)
ALT SERPL W/O P-5'-P-CCNC: 28 U/L (ref 10–44)
ANION GAP SERPL CALC-SCNC: 7 MMOL/L (ref 8–16)
AST SERPL-CCNC: 28 U/L (ref 10–40)
BASOPHILS # BLD AUTO: 0.02 K/UL (ref 0–0.2)
BASOPHILS NFR BLD: 0.4 % (ref 0–1.9)
BILIRUB SERPL-MCNC: 1.2 MG/DL (ref 0.1–1)
BUN SERPL-MCNC: 14 MG/DL (ref 8–23)
CALCIUM SERPL-MCNC: 8.9 MG/DL (ref 8.7–10.5)
CHLORIDE SERPL-SCNC: 107 MMOL/L (ref 95–110)
CO2 SERPL-SCNC: 26 MMOL/L (ref 23–29)
CREAT SERPL-MCNC: 1.1 MG/DL (ref 0.5–1.4)
DIFFERENTIAL METHOD: ABNORMAL
EOSINOPHIL # BLD AUTO: 0 K/UL (ref 0–0.5)
EOSINOPHIL NFR BLD: 0.6 % (ref 0–8)
ERYTHROCYTE [DISTWIDTH] IN BLOOD BY AUTOMATED COUNT: 17.1 % (ref 11.5–14.5)
EST. GFR  (AFRICAN AMERICAN): >60 ML/MIN/1.73 M^2
EST. GFR  (NON AFRICAN AMERICAN): >60 ML/MIN/1.73 M^2
GLUCOSE SERPL-MCNC: 92 MG/DL (ref 70–110)
HCT VFR BLD AUTO: 32.8 % (ref 40–54)
HGB BLD-MCNC: 10.4 G/DL (ref 14–18)
IMM GRANULOCYTES # BLD AUTO: 0 K/UL (ref 0–0.04)
IMM GRANULOCYTES NFR BLD AUTO: 0 % (ref 0–0.5)
LYMPHOCYTES # BLD AUTO: 1.8 K/UL (ref 1–4.8)
LYMPHOCYTES NFR BLD: 35.1 % (ref 18–48)
MCH RBC QN AUTO: 26.6 PG (ref 27–31)
MCHC RBC AUTO-ENTMCNC: 31.7 G/DL (ref 32–36)
MCV RBC AUTO: 84 FL (ref 82–98)
MONOCYTES # BLD AUTO: 0.6 K/UL (ref 0.3–1)
MONOCYTES NFR BLD: 11.3 % (ref 4–15)
NEUTROPHILS # BLD AUTO: 2.7 K/UL (ref 1.8–7.7)
NEUTROPHILS NFR BLD: 52.6 % (ref 38–73)
NRBC BLD-RTO: 0 /100 WBC
PLATELET # BLD AUTO: 128 K/UL (ref 150–350)
PMV BLD AUTO: 11.5 FL (ref 9.2–12.9)
POTASSIUM SERPL-SCNC: 4.2 MMOL/L (ref 3.5–5.1)
PROT SERPL-MCNC: 7.1 G/DL (ref 6–8.4)
RBC # BLD AUTO: 3.91 M/UL (ref 4.6–6.2)
SODIUM SERPL-SCNC: 140 MMOL/L (ref 136–145)
WBC # BLD AUTO: 5.21 K/UL (ref 3.9–12.7)

## 2020-11-27 PROCEDURE — 85025 COMPLETE CBC W/AUTO DIFF WBC: CPT

## 2020-11-27 PROCEDURE — 36415 COLL VENOUS BLD VENIPUNCTURE: CPT

## 2020-11-27 PROCEDURE — 80053 COMPREHEN METABOLIC PANEL: CPT

## 2021-04-26 ENCOUNTER — PATIENT MESSAGE (OUTPATIENT)
Dept: RESEARCH | Facility: HOSPITAL | Age: 61
End: 2021-04-26

## 2021-11-20 ENCOUNTER — IMMUNIZATION (OUTPATIENT)
Dept: PRIMARY CARE CLINIC | Facility: CLINIC | Age: 61
End: 2021-11-20
Payer: COMMERCIAL

## 2021-11-20 DIAGNOSIS — Z23 NEED FOR VACCINATION: Primary | ICD-10-CM

## 2021-11-20 PROCEDURE — 0004A COVID-19, MRNA, LNP-S, PF, 30 MCG/0.3 ML DOSE VACCINE: CPT | Mod: CV19,PBBFAC | Performed by: INTERNAL MEDICINE

## 2022-06-23 ENCOUNTER — IMMUNIZATION (OUTPATIENT)
Dept: PRIMARY CARE CLINIC | Facility: CLINIC | Age: 62
End: 2022-06-23
Payer: COMMERCIAL

## 2022-06-23 DIAGNOSIS — Z23 NEED FOR VACCINATION: Primary | ICD-10-CM

## 2022-06-23 PROCEDURE — 91305 COVID-19, MRNA, LNP-S, PF, 30 MCG/0.3 ML DOSE VACCINE (PFIZER): CPT | Mod: PBBFAC | Performed by: INTERNAL MEDICINE

## 2022-08-15 ENCOUNTER — PATIENT OUTREACH (OUTPATIENT)
Dept: ADMINISTRATIVE | Facility: HOSPITAL | Age: 62
End: 2022-08-15
Payer: COMMERCIAL

## 2022-08-15 NOTE — PROGRESS NOTES
Population Health review. 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

## 2022-09-22 NOTE — PLAN OF CARE
Problem: Occupational Therapy Goal  Goal: Occupational Therapy Goal  Goals to be met by: 4/14/19     Patient will increase functional independence with ADLs by performing:    UE Dressing with Supervision.  LE Dressing with Supervision.  Grooming while standing at sink with Supervision.  Toileting from toilet with Supervision for hygiene and clothing management.   Toilet transfers with Supervision.     Outcome: Ongoing (interventions implemented as appropriate)  The above goals remain appropriate. SERGIO Esqueda  4/9/2019         Normal rate, regular rhythm.  Heart sounds S1, S2.  No murmurs, rubs or gallops.

## 2024-04-03 NOTE — CARE UPDATE
BG goal 140 - 180      Intensive IV insulin infusion discontinued overnight. Patient fell off infusion.   BG is now within goal without need for insulin therapy. Will continue to follow, and monitor for prandial excursions. Patient is post-op day 2 [place] Procedure.  Continue Low Dose SQ Insulin Correction Scale PRN hyperglycemia.  BG monitoring AC/HS.      Discharge planning: JUNIE       Script was sent yesterday with corrections.

## (undated) DEVICE — ELECTRODE REM PLYHSV RETURN 9

## (undated) DEVICE — SUT MCRYL PLUS 4-0 PS2 27IN

## (undated) DEVICE — SYS DEL CRITIKIT INJECTATE

## (undated) DEVICE — DRAPE C ARM 42 X 120 10/BX

## (undated) DEVICE — BELLOW CANN HEMOBLAST 1.65GR

## (undated) DEVICE — CATH DXTERITY JL50 100CM 6FR

## (undated) DEVICE — SUT VICRYL PLUS 2-0 CT1 18

## (undated) DEVICE — ELECTRODE BLD EXT INSUL 1

## (undated) DEVICE — SUT 2/0 30IN SILK BLK BRAI

## (undated) DEVICE — GLOVE PROTEXIS PI CLASSIC 8.0

## (undated) DEVICE — SUT VICRYL+ 1 CT1 18IN

## (undated) DEVICE — CATH DXTERITY PIGSTR 110CM 6FR

## (undated) DEVICE — BUR BONE CUT MICRO TPS 3X3.8MM

## (undated) DEVICE — CANNULA RETROGRADE CARDIOPLEG

## (undated) DEVICE — GOWN SMART IMP BREATHABLE XXLG

## (undated) DEVICE — STOPCOCK 3 WAY MED PRESSURE

## (undated) DEVICE — SEE MEDLINE ITEM 153151

## (undated) DEVICE — CATH DXTERITY JL40 100CM 6FR

## (undated) DEVICE — Device

## (undated) DEVICE — HEMOSTAT SURGICEL 4X8IN

## (undated) DEVICE — DRESSING MEPILEX BORDR AG 4X12

## (undated) DEVICE — SOL NACL 0.9% INJ 500ML BG

## (undated) DEVICE — CHLORAPREP 10.5 ML APPLICATOR

## (undated) DEVICE — CLOSURE SKIN STERI STRIP 1/4X3

## (undated) DEVICE — SEE MEDLINE ITEM 157131

## (undated) DEVICE — DRESSING ADH ISLAND 3.6 X 14

## (undated) DEVICE — ADHESIVE MASTISOL VIAL 48/BX

## (undated) DEVICE — DEVICE MYNX GRIP 6/7F

## (undated) DEVICE — DRAPE LAP TIBURON 77X122IN

## (undated) DEVICE — DRAPE MICROSCOPE ZEISS

## (undated) DEVICE — BLADE MILL BONE MEDIUM

## (undated) DEVICE — NDL 18GA

## (undated) DEVICE — SUT 6 18IN STEEL MONO CCS

## (undated) DEVICE — SUT 2-0 VICRYL / CT-1

## (undated) DEVICE — ADAPTER PERFUSION 1/4

## (undated) DEVICE — GAUZE SPONGE 4'X4 12 PLY

## (undated) DEVICE — DRAIN CHANNEL ROUND 19FR

## (undated) DEVICE — SET BASIN GENERAL LATEX FREE

## (undated) DEVICE — SOL NS 1000CC

## (undated) DEVICE — SEALER BIPOLAR TISSUE 6.0

## (undated) DEVICE — SUT VICRYL BR 1 GEN 27 CT-1

## (undated) DEVICE — BURR CUTTING 3.0MM MATCH STICK

## (undated) DEVICE — INSERTS STEALTH FIBRA SIZE 5

## (undated) DEVICE — GUIDEWIRE 145CM .035

## (undated) DEVICE — CLAMP LONG JAW ISOLATOR OPEN

## (undated) DEVICE — SHEET CARDIOVASCULAR SPLIT

## (undated) DEVICE — KIT SURGIFLO HEMOSTATIC MATRIX

## (undated) DEVICE — CATH DXTERITY JR40 100CM 6FR

## (undated) DEVICE — OMNIPAQUE 350MG 150ML VIAL

## (undated) DEVICE — BLADE SURG CARBON STEEL SZ11

## (undated) DEVICE — DRESSING AQUACEL SACRAL 9 X 9

## (undated) DEVICE — GLOVE BIOGEL SKINSENSE PI 7.0

## (undated) DEVICE — SEE MEDLINE ITEM 152622

## (undated) DEVICE — BLADE SAW STERNAL 5/BX

## (undated) DEVICE — DRAPE SLUSH WARMER WITH DISC

## (undated) DEVICE — DRESSING SPONGE 16PLY 4X4 NS

## (undated) DEVICE — TAPE UMBILICAL 1/8X36IN WHITE

## (undated) DEVICE — SEE MEDLINE ITEM 157117

## (undated) DEVICE — SPONGE GELFOAM 12-7MM

## (undated) DEVICE — TRAY HEART

## (undated) DEVICE — DRAIN CHEST DRY SUCTION

## (undated) DEVICE — TAPE SURG MEDIPORE 6X72IN

## (undated) DEVICE — KIT URINARY CATH URINE METER

## (undated) DEVICE — DRAPE STERI INSTRUMENT 1018

## (undated) DEVICE — UNDERGLOVE BIOGEL PI SZ 6.5 LF

## (undated) DEVICE — COVER SET UP STRL 54X54 20/BX

## (undated) DEVICE — DRESSING N ADH OIL EMUL 3X3

## (undated) DEVICE — APPLICATOR CHLORAPREP ORN 26ML

## (undated) DEVICE — BANDAGE GAUZE 6PLY FLUFF 2X3

## (undated) DEVICE — DRAPE CV SPLIT SHEET 110X137X8

## (undated) DEVICE — SUT 4/0 18IN NUROLON BLK B

## (undated) DEVICE — SUT PROLENE 4-0 SH BLU 36IN

## (undated) DEVICE — GLOVE PROTEXIS PI CLASSIC 7.5

## (undated) DEVICE — FOGERTY SOFT JAW DISP 2/PK

## (undated) DEVICE — SUT PROLENE 4-0 RB-1 BL MO

## (undated) DEVICE — SOL 9P NACL IRR PIC IL

## (undated) DEVICE — CONTAINER SPECIMEN STRL 4OZ

## (undated) DEVICE — PAD ABD 8X10 STERILE

## (undated) DEVICE — DRAPE INCISE IOBAN 2 23X33IN

## (undated) DEVICE — DRAIN CHEST WATER SEAL

## (undated) DEVICE — DRAPE INCISE IOBAN 2 23X17IN

## (undated) DEVICE — GLOVE BIOGEL PI MICRO SZ 7.5

## (undated) DEVICE — SUT SILK BLK BR. 2 2-60

## (undated) DEVICE — GLOVE BIOGEL SKINSENSE PI 8.0

## (undated) DEVICE — SYR ANGIO INJ CT/V200 200

## (undated) DEVICE — CATH SWAN GANZ STND 7FR

## (undated) DEVICE — RETRACTOR OCTOBASE INSERT HOLD

## (undated) DEVICE — SUT BONE WAX 2.5 GRMS 12/BX

## (undated) DEVICE — SUT SILK 2-0 SH 18IN BLACK

## (undated) DEVICE — TAPE ADH MEDIPORE 4 X 10YDS

## (undated) DEVICE — HEMOSTAT SURGICEL NU-KNIT 6X9

## (undated) DEVICE — INTRODUCER CATH 7F 11CML

## (undated) DEVICE — UNDERGLOVES BIOGEL PI SIZE 8.5

## (undated) DEVICE — SUT ETHIBOND EXCEL 2-0 SH-2

## (undated) DEVICE — SPONGE NEURO 1/2 X 2

## (undated) DEVICE — BLADE SURG #15 CARBON STEEL

## (undated) DEVICE — ELECTRODE BLADE TEFLON 6

## (undated) DEVICE — SPONGE COTTON TRAY 4X4IN

## (undated) DEVICE — TRAY CORONARY CUSTOM BAPTIST

## (undated) DEVICE — SET DECANTER MEDICHOICE

## (undated) DEVICE — SYR 10ML LIDOCAINE

## (undated) DEVICE — SEE MEDLINE ITEM 146417

## (undated) DEVICE — DRAPE SURG W/TWL 17 5/8X23

## (undated) DEVICE — SUT 2/0 30IN ETHIBOND

## (undated) DEVICE — BLADE SURG STAINLESS STEEL #11

## (undated) DEVICE — STAPLER SKIN ROTATING HEAD

## (undated) DEVICE — TUBING PRESS MONITOR 6 STER

## (undated) DEVICE — POSITIONER IV ARMBOARD FOAM

## (undated) DEVICE — SUT PROLENE 5-0 BL C-1 4-24

## (undated) DEVICE — SPONGE GAUZE 16PLY 4X4